# Patient Record
Sex: MALE | Race: BLACK OR AFRICAN AMERICAN | Employment: FULL TIME | URBAN - METROPOLITAN AREA
[De-identification: names, ages, dates, MRNs, and addresses within clinical notes are randomized per-mention and may not be internally consistent; named-entity substitution may affect disease eponyms.]

---

## 2018-01-11 ENCOUNTER — OFFICE VISIT (OUTPATIENT)
Dept: INTERNAL MEDICINE CLINIC | Age: 53
End: 2018-01-11

## 2018-01-11 VITALS
HEART RATE: 88 BPM | WEIGHT: 228 LBS | DIASTOLIC BLOOD PRESSURE: 72 MMHG | BODY MASS INDEX: 30.22 KG/M2 | SYSTOLIC BLOOD PRESSURE: 138 MMHG | RESPIRATION RATE: 16 BRPM | HEIGHT: 73 IN

## 2018-01-11 DIAGNOSIS — R73.03 PRE-DIABETES: ICD-10-CM

## 2018-01-11 DIAGNOSIS — I10 ESSENTIAL HYPERTENSION: ICD-10-CM

## 2018-01-11 DIAGNOSIS — D17.1 LIPOMA OF TORSO: ICD-10-CM

## 2018-01-11 DIAGNOSIS — R68.82 LOW LIBIDO: ICD-10-CM

## 2018-01-11 DIAGNOSIS — R13.19 ESOPHAGEAL DYSPHAGIA: Primary | ICD-10-CM

## 2018-01-11 PROCEDURE — 99204 OFFICE O/P NEW MOD 45 MIN: CPT | Performed by: INTERNAL MEDICINE

## 2018-01-11 RX ORDER — ASPIRIN 81 MG/1
81 TABLET ORAL DAILY
Qty: 30 TABLET | Refills: 3 | Status: SHIPPED | OUTPATIENT
Start: 2018-01-11 | End: 2018-05-10 | Stop reason: SDUPTHER

## 2018-01-11 RX ORDER — NIFEDIPINE 90 MG/1
90 TABLET, FILM COATED, EXTENDED RELEASE ORAL DAILY
COMMUNITY
End: 2018-01-11 | Stop reason: SDUPTHER

## 2018-01-11 RX ORDER — LOSARTAN POTASSIUM 25 MG/1
25 TABLET ORAL DAILY
Qty: 30 TABLET | Refills: 3 | Status: SHIPPED | OUTPATIENT
Start: 2018-01-11 | End: 2018-05-10 | Stop reason: SDUPTHER

## 2018-01-11 RX ORDER — M-VIT,TX,IRON,MINS/CALC/FOLIC 27MG-0.4MG
1 TABLET ORAL DAILY
COMMUNITY

## 2018-01-11 RX ORDER — NIFEDIPINE 90 MG/1
90 TABLET, FILM COATED, EXTENDED RELEASE ORAL DAILY
Qty: 30 TABLET | Refills: 3 | Status: SHIPPED | OUTPATIENT
Start: 2018-01-11 | End: 2018-08-06 | Stop reason: SDUPTHER

## 2018-01-11 RX ORDER — HYDROCHLOROTHIAZIDE 25 MG/1
25 TABLET ORAL EVERY MORNING
Qty: 30 TABLET | Refills: 3 | Status: SHIPPED | OUTPATIENT
Start: 2018-01-11 | End: 2018-05-10 | Stop reason: SDUPTHER

## 2018-01-11 RX ORDER — LISINOPRIL AND HYDROCHLOROTHIAZIDE 20; 12.5 MG/1; MG/1
1 TABLET ORAL DAILY
COMMUNITY
End: 2018-01-11 | Stop reason: SINTOL

## 2018-01-11 RX ORDER — OMEPRAZOLE 40 MG/1
40 CAPSULE, DELAYED RELEASE ORAL
Qty: 30 CAPSULE | Refills: 3 | Status: SHIPPED | OUTPATIENT
Start: 2018-01-11 | End: 2018-05-10 | Stop reason: SDUPTHER

## 2018-01-14 ASSESSMENT — ENCOUNTER SYMPTOMS
SHORTNESS OF BREATH: 0
COUGH: 1
EYES NEGATIVE: 1
BLOOD IN STOOL: 0
ABDOMINAL PAIN: 0
VOMITING: 1
CONSTIPATION: 0
TROUBLE SWALLOWING: 1
DIARRHEA: 0

## 2018-02-03 ENCOUNTER — HOSPITAL ENCOUNTER (OUTPATIENT)
Dept: GENERAL RADIOLOGY | Age: 53
Discharge: HOME | End: 2018-02-03
Attending: INTERNAL MEDICINE | Admitting: INTERNAL MEDICINE

## 2018-02-03 DIAGNOSIS — R13.19 ESOPHAGEAL DYSPHAGIA: ICD-10-CM

## 2018-02-03 DIAGNOSIS — R13.10 DYSPHAGIA: ICD-10-CM

## 2018-05-10 RX ORDER — HYDROCHLOROTHIAZIDE 25 MG/1
TABLET ORAL
Qty: 30 TABLET | Refills: 2 | Status: SHIPPED | OUTPATIENT
Start: 2018-05-10 | End: 2018-08-10 | Stop reason: SDUPTHER

## 2018-05-10 RX ORDER — ASPIRIN 81 MG/1
TABLET ORAL
Qty: 30 TABLET | Refills: 2 | Status: SHIPPED | OUTPATIENT
Start: 2018-05-10 | End: 2018-08-10 | Stop reason: SDUPTHER

## 2018-05-10 RX ORDER — OMEPRAZOLE 40 MG/1
CAPSULE, DELAYED RELEASE ORAL
Qty: 30 CAPSULE | Refills: 2 | Status: SHIPPED | OUTPATIENT
Start: 2018-05-10 | End: 2018-08-10 | Stop reason: SDUPTHER

## 2018-05-10 RX ORDER — LOSARTAN POTASSIUM 25 MG/1
TABLET ORAL
Qty: 30 TABLET | Refills: 2 | Status: SHIPPED | OUTPATIENT
Start: 2018-05-10 | End: 2018-08-10 | Stop reason: SDUPTHER

## 2018-08-06 RX ORDER — NIFEDIPINE 90 MG/1
90 TABLET, FILM COATED, EXTENDED RELEASE ORAL DAILY
Qty: 30 TABLET | Refills: 1 | Status: SHIPPED | OUTPATIENT
Start: 2018-08-06 | End: 2018-08-07 | Stop reason: SDUPTHER

## 2018-08-07 RX ORDER — NIFEDIPINE 90 MG/1
90 TABLET, FILM COATED, EXTENDED RELEASE ORAL DAILY
Qty: 30 TABLET | Refills: 1 | Status: SHIPPED | OUTPATIENT
Start: 2018-08-07 | End: 2018-10-09 | Stop reason: SDUPTHER

## 2018-08-10 RX ORDER — OMEPRAZOLE 40 MG/1
CAPSULE, DELAYED RELEASE ORAL
Qty: 30 CAPSULE | Refills: 1 | Status: SHIPPED | OUTPATIENT
Start: 2018-08-10 | End: 2018-10-09 | Stop reason: SDUPTHER

## 2018-08-10 RX ORDER — ASPIRIN 81 MG/1
TABLET ORAL
Qty: 30 TABLET | Refills: 1 | Status: SHIPPED | OUTPATIENT
Start: 2018-08-10 | End: 2018-10-09 | Stop reason: SDUPTHER

## 2018-08-10 RX ORDER — LOSARTAN POTASSIUM 25 MG/1
TABLET ORAL
Qty: 30 TABLET | Refills: 1 | Status: SHIPPED | OUTPATIENT
Start: 2018-08-10 | End: 2018-10-09 | Stop reason: SDUPTHER

## 2018-08-10 RX ORDER — HYDROCHLOROTHIAZIDE 25 MG/1
TABLET ORAL
Qty: 30 TABLET | Refills: 1 | Status: SHIPPED | OUTPATIENT
Start: 2018-08-10 | End: 2018-10-09 | Stop reason: SDUPTHER

## 2018-09-23 ENCOUNTER — HOSPITAL ENCOUNTER (EMERGENCY)
Age: 53
Discharge: HOME | End: 2018-09-23
Payer: COMMERCIAL

## 2018-09-23 ENCOUNTER — APPOINTMENT (OUTPATIENT)
Dept: GENERAL RADIOLOGY | Age: 53
End: 2018-09-23
Payer: COMMERCIAL

## 2018-09-23 VITALS
HEART RATE: 81 BPM | BODY MASS INDEX: 30.88 KG/M2 | OXYGEN SATURATION: 93 % | HEIGHT: 72 IN | RESPIRATION RATE: 18 BRPM | TEMPERATURE: 98.4 F | SYSTOLIC BLOOD PRESSURE: 148 MMHG | DIASTOLIC BLOOD PRESSURE: 93 MMHG | WEIGHT: 228 LBS

## 2018-09-23 DIAGNOSIS — M25.561 CHRONIC PAIN OF RIGHT KNEE: Primary | ICD-10-CM

## 2018-09-23 DIAGNOSIS — G89.29 CHRONIC PAIN OF RIGHT KNEE: Primary | ICD-10-CM

## 2018-09-23 PROCEDURE — 6370000000 HC RX 637 (ALT 250 FOR IP): Performed by: PHYSICIAN ASSISTANT

## 2018-09-23 PROCEDURE — 73560 X-RAY EXAM OF KNEE 1 OR 2: CPT

## 2018-09-23 PROCEDURE — 99283 EMERGENCY DEPT VISIT LOW MDM: CPT

## 2018-09-23 PROCEDURE — 6360000002 HC RX W HCPCS: Performed by: PHYSICIAN ASSISTANT

## 2018-09-23 RX ORDER — OXYCODONE HYDROCHLORIDE AND ACETAMINOPHEN 5; 325 MG/1; MG/1
2 TABLET ORAL ONCE
Status: COMPLETED | OUTPATIENT
Start: 2018-09-23 | End: 2018-09-23

## 2018-09-23 RX ORDER — TRAMADOL HYDROCHLORIDE 50 MG/1
50 TABLET ORAL EVERY 6 HOURS PRN
Qty: 10 TABLET | Refills: 0 | Status: SHIPPED | OUTPATIENT
Start: 2018-09-23 | End: 2018-10-03

## 2018-09-23 RX ORDER — CHLORAL HYDRATE 500 MG
3000 CAPSULE ORAL 3 TIMES DAILY
COMMUNITY
End: 2019-10-23 | Stop reason: ALTCHOICE

## 2018-09-23 RX ORDER — ONDANSETRON 4 MG/1
4 TABLET, ORALLY DISINTEGRATING ORAL ONCE
Status: COMPLETED | OUTPATIENT
Start: 2018-09-23 | End: 2018-09-23

## 2018-09-23 RX ADMIN — ONDANSETRON 4 MG: 4 TABLET, ORALLY DISINTEGRATING ORAL at 06:51

## 2018-09-23 RX ADMIN — OXYCODONE HYDROCHLORIDE AND ACETAMINOPHEN 2 TABLET: 5; 325 TABLET ORAL at 06:51

## 2018-09-23 ASSESSMENT — PAIN SCALES - GENERAL
PAINLEVEL_OUTOF10: 6
PAINLEVEL_OUTOF10: 7

## 2018-10-05 ENCOUNTER — TELEPHONE (OUTPATIENT)
Dept: INTERNAL MEDICINE CLINIC | Age: 53
End: 2018-10-05

## 2018-10-05 ENCOUNTER — OFFICE VISIT (OUTPATIENT)
Dept: INTERNAL MEDICINE CLINIC | Age: 53
End: 2018-10-05
Payer: COMMERCIAL

## 2018-10-05 VITALS
WEIGHT: 234 LBS | TEMPERATURE: 98.2 F | SYSTOLIC BLOOD PRESSURE: 126 MMHG | BODY MASS INDEX: 31.01 KG/M2 | HEART RATE: 80 BPM | HEIGHT: 73 IN | DIASTOLIC BLOOD PRESSURE: 76 MMHG | RESPIRATION RATE: 16 BRPM

## 2018-10-05 DIAGNOSIS — Z01.818 PREOP EXAM FOR INTERNAL MEDICINE: ICD-10-CM

## 2018-10-05 DIAGNOSIS — Z82.49 FAMILY HISTORY OF BLOOD CLOTS: ICD-10-CM

## 2018-10-05 DIAGNOSIS — I10 ESSENTIAL HYPERTENSION: ICD-10-CM

## 2018-10-05 DIAGNOSIS — S83.241D TEAR OF MEDIAL MENISCUS OF RIGHT KNEE, CURRENT, UNSPECIFIED TEAR TYPE, SUBSEQUENT ENCOUNTER: Primary | ICD-10-CM

## 2018-10-05 PROCEDURE — 93000 ELECTROCARDIOGRAM COMPLETE: CPT | Performed by: INTERNAL MEDICINE

## 2018-10-05 PROCEDURE — 99242 OFF/OP CONSLTJ NEW/EST SF 20: CPT | Performed by: INTERNAL MEDICINE

## 2018-10-05 ASSESSMENT — PATIENT HEALTH QUESTIONNAIRE - PHQ9
SUM OF ALL RESPONSES TO PHQ9 QUESTIONS 1 & 2: 0
1. LITTLE INTEREST OR PLEASURE IN DOING THINGS: 0
SUM OF ALL RESPONSES TO PHQ QUESTIONS 1-9: 0
SUM OF ALL RESPONSES TO PHQ QUESTIONS 1-9: 0
2. FEELING DOWN, DEPRESSED OR HOPELESS: 0

## 2018-10-09 DIAGNOSIS — I10 ESSENTIAL HYPERTENSION: Primary | ICD-10-CM

## 2018-10-10 RX ORDER — HYDROCHLOROTHIAZIDE 25 MG/1
TABLET ORAL
Qty: 30 TABLET | Refills: 0 | Status: SHIPPED | OUTPATIENT
Start: 2018-10-10 | End: 2018-11-19 | Stop reason: SDUPTHER

## 2018-10-10 RX ORDER — NIFEDIPINE 90 MG/1
TABLET, EXTENDED RELEASE ORAL
Qty: 30 TABLET | Refills: 2 | Status: SHIPPED | OUTPATIENT
Start: 2018-10-10 | End: 2019-01-11 | Stop reason: SDUPTHER

## 2018-10-10 RX ORDER — OMEPRAZOLE 40 MG/1
40 CAPSULE, DELAYED RELEASE ORAL DAILY PRN
Qty: 30 CAPSULE | Refills: 0 | Status: SHIPPED | OUTPATIENT
Start: 2018-10-10 | End: 2018-11-19 | Stop reason: SDUPTHER

## 2018-10-10 RX ORDER — LOSARTAN POTASSIUM 25 MG/1
TABLET ORAL
Qty: 30 TABLET | Refills: 0 | Status: SHIPPED | OUTPATIENT
Start: 2018-10-10 | End: 2018-11-19 | Stop reason: SDUPTHER

## 2018-10-10 RX ORDER — ASPIRIN 81 MG/1
TABLET ORAL
Qty: 30 TABLET | Refills: 0 | Status: SHIPPED | OUTPATIENT
Start: 2018-10-10 | End: 2020-08-31 | Stop reason: ALTCHOICE

## 2018-11-05 ENCOUNTER — TELEPHONE (OUTPATIENT)
Dept: INTERNAL MEDICINE CLINIC | Age: 53
End: 2018-11-05

## 2018-11-05 ENCOUNTER — OFFICE VISIT (OUTPATIENT)
Dept: INTERNAL MEDICINE CLINIC | Age: 53
End: 2018-11-05
Payer: COMMERCIAL

## 2018-11-05 VITALS
DIASTOLIC BLOOD PRESSURE: 78 MMHG | BODY MASS INDEX: 28.63 KG/M2 | TEMPERATURE: 97.5 F | HEART RATE: 80 BPM | SYSTOLIC BLOOD PRESSURE: 134 MMHG | HEIGHT: 73 IN | WEIGHT: 216 LBS

## 2018-11-05 DIAGNOSIS — I10 ESSENTIAL HYPERTENSION: Primary | ICD-10-CM

## 2018-11-05 DIAGNOSIS — Z01.818 PREOP EXAM FOR INTERNAL MEDICINE: ICD-10-CM

## 2018-11-05 DIAGNOSIS — S83.241D ACUTE MEDIAL MENISCUS TEAR OF RIGHT KNEE, SUBSEQUENT ENCOUNTER: ICD-10-CM

## 2018-11-05 PROCEDURE — 99242 OFF/OP CONSLTJ NEW/EST SF 20: CPT | Performed by: INTERNAL MEDICINE

## 2018-11-05 NOTE — TELEPHONE ENCOUNTER
RADHIKAI:  I called Dr. Yani Gutierrez office @ 12:56 am when patient checked in, to obtain what labs, etc, surgeon wanted for pre-op. I informed [employer] [facility] with [facility] that patient was here @ appointment now, so we needed this information asap. I received a call @ 2:45 from [facility], after patient had already completed his appointment and was gone, stating that surgeon, wants a CBC, BMP, and Urine with Culture. [facility] also states that patient was given a prescription with all labs deemed necessary by Dr. Karrie Finnegan. (Patient stated to me @ check in that he didn't have any paperwork with him.)    [facility] can be reached @ [phone].

## 2018-11-05 NOTE — TELEPHONE ENCOUNTER
Spoke with patient he will call [place] states he already had these done last time.  Instructed patient to call   [facility] for their advice on this

## 2018-11-05 NOTE — PROGRESS NOTES
Pre-operative evaluation    11/5/2018    Nahun Lindo  [id number]    Mr. Nahun Lindo is here for a preoperative evaluation. He is frustrated because he is off work for 5 weeks and still have not had his right knee operated on. He was sent to rheumatology and diagnosed with CPPD, psudogout, not RA. Patient denies chest pain, SOB, NVD, FC, rash, malaise, rigor, dizziness/lightheadness, other pertinent ROS was also reviewed. /78   Pulse 80   Temp 97.5 °F (36.4 °C) (Oral)   Ht 6' 1\" (1.854 m)   Wt 216 lb (98 kg)   BMI 28.50 kg/m²     Gen: Patient appears well groomed, well appearing  HEAD: Atraumatic, normocephalic,   Eyes: PERRLA, EOMI   Neck: supple, no thyroid nodule appreciated, no JVD  Chest: Clear to auscultation JAYA, unlabored breathing, normal expansion  Heart: Regular rate, regular rhythm, no murmur, no rub  Abdomen: Non-tender, non-distended, bowel sounds present x3  Extremities: no edema, distal pulses intact  Patient was alert and oriented to person, place and time  MSK: right knee swelling, pain, reduced ROM    Pamela Quinones was seen today for knee injury. Diagnoses and all orders for this visit:    Preop exam for internal medicine  Preoperative evaluation- from a cardiovascular stand point, patient is at low to moderate risk for proposed operation, but the risk is acceptable. Risk of complications of any surgery, including but not limited to MI, CVA, and death was explained. From a medical perspective the proposed surgery is reasonable. Ok to proceed from a medical perspective pending on labs/EKG/imaging review.     Essential hypertension  Controlled overall, continue meds, follow renal function     Acute medial meniscus tear of right knee, subsequent encounter  Planned for knee scope 11/8/2018 with [facility]      Current Outpatient Prescriptions on File Prior to Visit   Medication Sig Dispense Refill    NIFEdipine (PROCARDIA XL) 90 MG extended release tablet TAKE ONE TABLET BY

## 2018-11-21 RX ORDER — OMEPRAZOLE 40 MG/1
CAPSULE, DELAYED RELEASE ORAL
Qty: 30 CAPSULE | Refills: 0 | Status: SHIPPED | OUTPATIENT
Start: 2018-11-21 | End: 2019-10-23

## 2018-11-21 RX ORDER — HYDROCHLOROTHIAZIDE 25 MG/1
TABLET ORAL
Qty: 30 TABLET | Refills: 0 | Status: SHIPPED | OUTPATIENT
Start: 2018-11-21 | End: 2018-12-23 | Stop reason: SDUPTHER

## 2018-11-21 RX ORDER — LOSARTAN POTASSIUM 25 MG/1
TABLET ORAL
Qty: 30 TABLET | Refills: 0 | Status: SHIPPED | OUTPATIENT
Start: 2018-11-21 | End: 2018-12-23 | Stop reason: SDUPTHER

## 2018-12-24 RX ORDER — OMEPRAZOLE 40 MG/1
CAPSULE, DELAYED RELEASE ORAL
Qty: 30 CAPSULE | Refills: 5 | OUTPATIENT
Start: 2018-12-24

## 2018-12-24 RX ORDER — LOSARTAN POTASSIUM 25 MG/1
TABLET ORAL
Qty: 30 TABLET | Refills: 5 | Status: SHIPPED | OUTPATIENT
Start: 2018-12-24 | End: 2019-01-11 | Stop reason: ALTCHOICE

## 2018-12-24 RX ORDER — HYDROCHLOROTHIAZIDE 25 MG/1
TABLET ORAL
Qty: 30 TABLET | Refills: 5 | Status: SHIPPED | OUTPATIENT
Start: 2018-12-24 | End: 2019-01-11 | Stop reason: ALTCHOICE

## 2019-01-11 ENCOUNTER — OFFICE VISIT (OUTPATIENT)
Dept: INTERNAL MEDICINE CLINIC | Age: 54
End: 2019-01-11
Payer: COMMERCIAL

## 2019-01-11 VITALS
BODY MASS INDEX: 29.52 KG/M2 | RESPIRATION RATE: 16 BRPM | SYSTOLIC BLOOD PRESSURE: 136 MMHG | TEMPERATURE: 98.1 F | HEIGHT: 74 IN | HEART RATE: 72 BPM | DIASTOLIC BLOOD PRESSURE: 86 MMHG | WEIGHT: 230 LBS

## 2019-01-11 DIAGNOSIS — M25.561 CHRONIC PAIN OF RIGHT KNEE: ICD-10-CM

## 2019-01-11 DIAGNOSIS — I10 ESSENTIAL HYPERTENSION: Primary | ICD-10-CM

## 2019-01-11 DIAGNOSIS — Z23 NEED FOR PROPHYLACTIC VACCINATION AGAINST DIPHTHERIA-TETANUS-PERTUSSIS (DTP): ICD-10-CM

## 2019-01-11 DIAGNOSIS — H10.89 OTHER CONJUNCTIVITIS OF LEFT EYE: ICD-10-CM

## 2019-01-11 DIAGNOSIS — G89.29 CHRONIC PAIN OF RIGHT KNEE: ICD-10-CM

## 2019-01-11 PROCEDURE — 99214 OFFICE O/P EST MOD 30 MIN: CPT | Performed by: INTERNAL MEDICINE

## 2019-01-11 RX ORDER — LOSARTAN POTASSIUM AND HYDROCHLOROTHIAZIDE 12.5; 5 MG/1; MG/1
1 TABLET ORAL DAILY
Qty: 30 TABLET | Refills: 2 | Status: SHIPPED | OUTPATIENT
Start: 2019-01-11 | End: 2019-04-07 | Stop reason: SDUPTHER

## 2019-01-11 RX ORDER — NIFEDIPINE 90 MG/1
TABLET, EXTENDED RELEASE ORAL
Qty: 30 TABLET | Refills: 2 | Status: SHIPPED | OUTPATIENT
Start: 2019-01-11 | End: 2019-04-20 | Stop reason: SDUPTHER

## 2019-01-11 RX ORDER — NEOMYCIN SULFATE, POLYMYXIN B SULFATE AND DEXAMETHASONE 3.5; 10000; 1 MG/ML; [USP'U]/ML; MG/ML
1 SUSPENSION/ DROPS OPHTHALMIC 4 TIMES DAILY
Qty: 5 ML | Refills: 0 | Status: SHIPPED | OUTPATIENT
Start: 2019-01-11 | End: 2019-01-21

## 2019-01-18 ENCOUNTER — HOSPITAL ENCOUNTER (OUTPATIENT)
Dept: PHYSICAL THERAPY | Age: 54
Setting detail: THERAPIES SERIES
Discharge: HOME | End: 2019-01-18
Payer: COMMERCIAL

## 2019-01-18 PROCEDURE — 97161 PT EVAL LOW COMPLEX 20 MIN: CPT

## 2019-01-18 PROCEDURE — 97110 THERAPEUTIC EXERCISES: CPT

## 2019-01-21 ENCOUNTER — HOSPITAL ENCOUNTER (OUTPATIENT)
Dept: PHYSICAL THERAPY | Age: 54
Setting detail: THERAPIES SERIES
Discharge: HOME | End: 2019-01-21
Payer: COMMERCIAL

## 2019-01-21 PROCEDURE — 97110 THERAPEUTIC EXERCISES: CPT

## 2019-01-24 ENCOUNTER — HOSPITAL ENCOUNTER (OUTPATIENT)
Dept: PHYSICAL THERAPY | Age: 54
Setting detail: THERAPIES SERIES
Discharge: HOME | End: 2019-01-24
Payer: COMMERCIAL

## 2019-01-24 PROCEDURE — 97110 THERAPEUTIC EXERCISES: CPT

## 2019-01-29 ENCOUNTER — HOSPITAL ENCOUNTER (OUTPATIENT)
Dept: PHYSICAL THERAPY | Age: 54
Setting detail: THERAPIES SERIES
Discharge: HOME | End: 2019-01-29
Payer: COMMERCIAL

## 2019-01-29 PROCEDURE — 97140 MANUAL THERAPY 1/> REGIONS: CPT

## 2019-01-29 PROCEDURE — 97110 THERAPEUTIC EXERCISES: CPT

## 2019-01-31 ENCOUNTER — HOSPITAL ENCOUNTER (OUTPATIENT)
Dept: PHYSICAL THERAPY | Age: 54
Setting detail: THERAPIES SERIES
Discharge: HOME | End: 2019-01-31
Payer: COMMERCIAL

## 2019-01-31 PROCEDURE — 97140 MANUAL THERAPY 1/> REGIONS: CPT

## 2019-01-31 PROCEDURE — 97110 THERAPEUTIC EXERCISES: CPT

## 2019-02-26 ENCOUNTER — TELEPHONE (OUTPATIENT)
Dept: INTERNAL MEDICINE CLINIC | Age: 54
End: 2019-02-26

## 2019-02-26 DIAGNOSIS — G89.29 CHRONIC PAIN OF RIGHT KNEE: Primary | ICD-10-CM

## 2019-02-26 DIAGNOSIS — M25.561 CHRONIC PAIN OF RIGHT KNEE: Primary | ICD-10-CM

## 2019-03-05 ENCOUNTER — HOSPITAL ENCOUNTER (OUTPATIENT)
Dept: PHYSICAL THERAPY | Age: 54
Setting detail: THERAPIES SERIES
Discharge: HOME | End: 2019-03-05
Payer: COMMERCIAL

## 2019-03-12 ENCOUNTER — OFFICE VISIT (OUTPATIENT)
Dept: ORTHOPEDIC SURGERY | Age: 54
End: 2019-03-12
Payer: COMMERCIAL

## 2019-03-12 VITALS
HEART RATE: 68 BPM | SYSTOLIC BLOOD PRESSURE: 145 MMHG | BODY MASS INDEX: 30.48 KG/M2 | HEIGHT: 73 IN | WEIGHT: 230 LBS | DIASTOLIC BLOOD PRESSURE: 81 MMHG

## 2019-03-12 DIAGNOSIS — G89.29 CHRONIC PAIN OF RIGHT KNEE: Primary | ICD-10-CM

## 2019-03-12 DIAGNOSIS — M25.561 CHRONIC PAIN OF RIGHT KNEE: Primary | ICD-10-CM

## 2019-03-12 DIAGNOSIS — M22.2X1 PATELLOFEMORAL SYNDROME OF RIGHT KNEE: ICD-10-CM

## 2019-03-12 PROCEDURE — 99204 OFFICE O/P NEW MOD 45 MIN: CPT | Performed by: PHYSICIAN ASSISTANT

## 2019-03-12 RX ORDER — PREDNISONE 1 MG/1
5 TABLET ORAL DAILY
Qty: 10 TABLET | Refills: 2 | Status: SHIPPED | OUTPATIENT
Start: 2019-03-12 | End: 2019-04-16 | Stop reason: SDUPTHER

## 2019-03-12 RX ORDER — TRAMADOL HYDROCHLORIDE 50 MG/1
50 TABLET ORAL EVERY 6 HOURS PRN
Qty: 28 TABLET | Refills: 0 | Status: SHIPPED | OUTPATIENT
Start: 2019-03-12 | End: 2019-03-19

## 2019-04-01 ENCOUNTER — HOSPITAL ENCOUNTER (OUTPATIENT)
Dept: PHYSICAL THERAPY | Age: 54
Setting detail: THERAPIES SERIES
Discharge: HOME | End: 2019-04-01
Payer: COMMERCIAL

## 2019-04-01 PROCEDURE — 97110 THERAPEUTIC EXERCISES: CPT | Performed by: PHYSICAL THERAPIST

## 2019-04-01 PROCEDURE — 97161 PT EVAL LOW COMPLEX 20 MIN: CPT | Performed by: PHYSICAL THERAPIST

## 2019-04-01 NOTE — PLAN OF CARE
The [address] [place] [address] [facility]                                                       Physical Therapy Certification    Dear Referring Practitioner: Dr Darcy Wing,    We had the pleasure of evaluating the following patient for physical therapy services at [address] [place] [address]. A summary of our findings can be found in the initial assessment below. This includes our plan of care. If you have any questions or concerns regarding these findings, please do not hesitate to contact me at the office phone number checked above. Thank you for the referral.       Physician Signature:_______________________________Date:__________________  By signing above (or electronic signature), therapists plan is approved by physician      Patient: Sweta Jordan   : 1965   MRN: [id number]  Referring Physician: Referring Practitioner: Dr Darcy Wing      Evaluation Date: 2019      Medical Diagnosis Information:  Diagnosis: Right knee pain/PF pain  ICD10: M25.561; M22.2X1   Treatment Diagnosis: increased pain; decreased strength; decreased flexibility; increased swelling                                         Insurance information: PT Insurance Information: [place]     Precautions/ Contra-indications: PF pain  Latex Allergy:  [x]NO      []YES  Preferred Language for Healthcare:   [x]English       []other:    SUBJECTIVE: Patient stated complaint:  Pt had knee surgery at [facility] in 2018. He has pain in the knee and swelling laterally. He also reports PF pain. Cortisone injections are not providing relief. The last 2-3 weeks he has occasional giving way. Pain with the knee extension position. He has popping but this is not painful.      Relevant Medical History: OA, HBP  Functional Disability Index:PT G-Codes  Functional Assessment Tool Used: LEFS  Score: 75%    Pain Scale: 1-8/10  Easing factors:   Provocative pectoris (I20)  []Atherosclerosis (I70)   Musculoskeletal conditions   []Disc pathology   []Congenital spine pathologies   [x]Prior surgical intervention  []Osteoporosis (M81.8)  []Osteopenia (M85.8)   Endocrine conditions   []Hypothyroid (E03.9)  []Hyperthyroid Gastrointestinal conditions   []Constipation (W97.19)   Metabolic conditions   []Morbid obesity (E66.01)  []Diabetes type 1(E10.65) or 2 (E11.65)   []Neuropathy (G60.9)     Pulmonary conditions   []Asthma (J45)  []Coughing   []COPD (J44.9)   Psychological Disorders  []Anxiety (F41.9)  []Depression (F32.9)   []Other:   []Other:          Barriers to/and or personal factors that will affect rehab potential:              []Age  []Sex              []Motivation/Lack of Motivation                        []Co-Morbidities              []Cognitive Function, education/learning barriers              []Environmental, home barriers              []profession/work barriers  []past PT/medical experience  []other:  Justification: Pt has a good rehab potential.     Falls Risk Assessment (30 days):   [x] Falls Risk assessed and no intervention required.   [] Falls Risk assessed and Patient requires intervention due to being higher risk   TUG score (>12s at risk):     [] Falls education provided, including       G-Codes:  PT G-Codes  Functional Assessment Tool Used: LEFS  Score: 75%    ASSESSMENT:   Functional Impairments:     []Noted lumbar/proximal hip/LE hypomobility   []Decreased LE functional ROM   [x]Decreased core/proximal hip strength and neuromuscular control   [x]Decreased LE functional strength   [x]Reduced balance/proprioceptive control   []other:      Functional Activity Limitations (from functional questionnaire and intake)   [x]Reduced ability to tolerate prolonged functional positions   []Reduced ability or difficulty with changes of positions or transfers between positions   []Reduced ability to maintain good posture and demonstrate good body mechanics with sitting, bending, and lifting   [x]Reduced ability to sleep   [] Reduced ability or tolerance with driving and/or computer work   []Reduced ability to perform lifting, carrying tasks   []Reduced ability to squat   []Reduced ability to forward bend   []Reduced ability to ambulate prolonged functional periods/distances/surfaces   [x]Reduced ability to ascend/descend stairs   []Reduced ability to run, hop or jump   []other:     Participation Restrictions   []Reduced participation in self care activities   [x]Reduced participation in home management activities   []Reduced participation in work activities   [x]Reduced participation in social activities. []Reduced participation in sport activities. Classification :    []Signs/symptoms consistent with post-surgical status including decreased ROM, strength and function.    []Signs/symptoms consistent with joint sprain/strain   [x]Signs/symptoms consistent with patella-femoral syndrome   [x]Signs/symptoms consistent with knee OA/hip OA   []Signs/symptoms consistent with internal derangement of knee/Hip   [x]Signs/symptoms consistent with functional hip weakness/NMR control      []Signs/symptoms consistent with tendinitis/tendinosis    []signs/symptoms consistent with pathology which may benefit from Dry needling      []other:      Prognosis/Rehab Potential:      []Excellent   [x]Good    []Fair   []Poor    Tolerance of evaluation/treatment:    []Excellent   [x]Good    []Fair   []Poor    Physical Therapy Evaluation Complexity Justification  [x] A history of present problem with:  [x] no personal factors and/or comorbidities that impact the plan of care;  []1-2 personal factors and/or comorbidities that impact the plan of care  []3 personal factors and/or comorbidities that impact the plan of care  [x] An examination of body systems using standardized tests and measures addressing any of the following: body structures and functions (impairments), activity limitations, and/or participation restrictions;:  [] a total of 1-2 or more elements   [x] a total of 3 or more elements   [] a total of 4 or more elements   [x] A clinical presentation with:  [x] stable and/or uncomplicated characteristics   [] evolving clinical presentation with changing characteristics  [] unstable and unpredictable characteristics;   [x] Clinical decision making of [x] low, [] moderate, [] high complexity using standardized patient assessment instrument and/or measurable assessment of functional outcome. [x] EVAL (LOW) 15692 (typically 20 minutes face-to-face)  [] EVAL (MOD) 37512 (typically 30 minutes face-to-face)  [] EVAL (HIGH) 01947 (typically 45 minutes face-to-face)  [] RE-EVAL       PLAN  Frequency/Duration:  2 days per week for 6-8 Weeks:  Interventions:  [x]  Therapeutic exercise including: strength training, ROM, for Lower extremity and core   [x]  NMR activation and proprioception for LE, Glutes and Core   [x]  Manual therapy as indicated for LE, Hip and spine to include: Dry Needling/IASTM, STM, PROM, Gr I-IV mobilizations, manipulation. [x] Modalities as needed that may include: thermal agents, E-stim, Biofeedback, US, iontophoresis as indicated  [x] Patient education on joint protection, postural re-education, activity modification, progression of HEP. HEP instruction: Pt received a written HEP today. (see scanned forms)    GOALS:  Patient stated goal: get stronger and better function of right leg. Be able to run confidently and painfree. Therapist goals for Patient:   Short Term Goals: To be achieved in: 2 weeks  1. Independent in HEP and progression per patient tolerance, in order to prevent re-injury. 2. Patient will have a decrease in pain to facilitate improvement in movement, function, and ADLs as indicated by Functional Deficits. Long Term Goals: To be achieved in: 8 weeks  1.  Disability index score of 10% or less for the LEFS to assist with reaching prior level of function. 2. Patient will demonstrate increased AROM to WNL to allow for proper joint functioning as indicated by patients Functional Deficits. 3. Patient will demonstrate an increase in Strength to good proximal hip strength and control in LE to allow for proper functional mobility as indicated by patients Functional Deficits. 4. Patient will return to ADL and household functional activities without increased symptoms or restriction. 5. Pt will initiate the return to running and recreational activities with mild restrictions and symptoms.        Electronically signed by:  Jaylene Campbell PT

## 2019-04-01 NOTE — FLOWSHEET NOTE
[address] [facility]    [facility] Therapy Daily Treatment Note  Date:  2019    Patient Name:  Candida Ross    :  1965  MRN: [id number]  Restrictions/Precautions:    Medical/Treatment Diagnosis Information:  · Diagnosis: Right knee pain/PF pain  ·   ICD10: M25.561; M22.2X1  · Treatment Diagnosis: increased pain; decreased strength; decreased flexibility; increased swelling  Insurance/Certification information:  PT Insurance Information: [place]  Physician Information:  Referring Practitioner: Dr Virginia Sullivan of care signed (Y/N):     Date of Patient follow up with Physician:     G-Code (if applicable):      Date G-Code Applied:  19  PT G-Codes  Functional Assessment Tool Used: LEFS  Score: 75%    Progress Note: [x]  Yes  []  No  Next due by: Visit #10       Latex Allergy:  [x]NO      []YES  Preferred Language for Healthcare:   [x]English       []other:    Visit # Insurance Allowable   15 60     Pain level:  1-8/10     SUBJECTIVE:  See eval    OBJECTIVE: See eval  Observation:   Test measurements:      RESTRICTIONS/PRECAUTIONS: PF pain    Exercises/Interventions:   Exercise/Equipment Resistance/Repetitions Other comments   Stretching     Hamstring [id number]    Towel Pull     Inclined Calf [id number]    Hip Flexion     ITB     Groin     Quad                    SLR     Supine 3x10    Abduction Clam shells 3x10    Adduction 3x10    Prone     SLR+          Isometrics     Quad sets [id number] with a towel roll         Patellar Glides     Medial     Superior     Inferior          ROM     Sheet Pulls     Hang Weights     Passive     Active     Weight Shift     Ankle Pumps                    CKC     Calf raises     Wall sits     Step ups     1 leg stand [id number]    Squatting     CC TKE     Balance     bridges 3x10         PRE     Extension  RANGE:   Flexion  RANGE:        Quantum machines     Leg press  3x10 3x/week   Leg extension     Leg curl 3x10 3x/week Manual interventions                     Therapeutic Exercise and NMR EXR  [x] (49353) Provided verbal/tactile cueing for activities related to strengthening, flexibility, endurance, ROM for improvements in LE, proximal hip, and core control with self care, mobility, lifting, ambulation.  [] (30831) Provided verbal/tactile cueing for activities related to improving balance, coordination, kinesthetic sense, posture, motor skill, proprioception  to assist with LE, proximal hip, and core control in self care, mobility, lifting, ambulation and eccentric single leg control.      NMR and Therapeutic Activities:    [] (69205 or 54320) Provided verbal/tactile cueing for activities related to improving balance, coordination, kinesthetic sense, posture, motor skill, proprioception and motor activation to allow for proper function of core, proximal hip and LE with self care and ADLs  [] (35184) Gait Re-education- Provided training and instruction to the patient for proper LE, core and proximal hip recruitment and positioning and eccentric body weight control with ambulation re-education including up and down stairs     Home Exercise Program:    [x] (27156) Reviewed/Progressed HEP activities related to strengthening, flexibility, endurance, ROM of core, proximal hip and LE for functional self-care, mobility, lifting and ambulation/stair navigation   [] (75783)Reviewed/Progressed HEP activities related to improving balance, coordination, kinesthetic sense, posture, motor skill, proprioception of core, proximal hip and LE for self care, mobility, lifting, and ambulation/stair navigation      Manual Treatments:  PROM / STM / Oscillations-Mobs:  G-I, II, III, IV (PA's, Inf., Post.)  [] (64661) Provided manual therapy to mobilize LE, proximal hip and/or LS spine soft tissue/joints for the purpose of modulating pain, promoting relaxation,  increasing ROM, reducing/eliminating soft tissue swelling/inflammation/restriction, improving soft tissue extensibility and allowing for proper ROM for normal function with self care, mobility, lifting and ambulation. Modalities:  Pt declined ice. He will do this at home. Charges:  Timed Code Treatment Minutes: 25   Total Treatment Minutes: 60       [x] EVAL (LOW) 16532 (typically 20 minutes face-to-face)  [] EVAL (MOD) 85121 (typically 30 minutes face-to-face)  [] EVAL (HIGH) 53369 (typically 45 minutes face-to-face)  [] RE-EVAL   [x] PL(66151) x  2   [] IONTO  [] NMR (66895) x      [] VASO  [] Manual (63641) x       [] Other:  [] TA x       [] Mech Traction (70139)  [] ES(attended) (27018)      [] ES (un) (35423):     GOALS:   GOALS:  Patient stated goal: get stronger and better function of right leg. Be able to run confidently and painfree.     Therapist goals for Patient:   Short Term Goals: To be achieved in: 2 weeks  1. Independent in HEP and progression per patient tolerance, in order to prevent re-injury. 2. Patient will have a decrease in pain to facilitate improvement in movement, function, and ADLs as indicated by Functional Deficits.     Long Term Goals: To be achieved in: 8 weeks  1. Disability index score of 10% or less for the LEFS to assist with reaching prior level of function. 2. Patient will demonstrate increased AROM to WNL to allow for proper joint functioning as indicated by patients Functional Deficits. 3. Patient will demonstrate an increase in Strength to good proximal hip strength and control in LE to allow for proper functional mobility as indicated by patients Functional Deficits. 4. Patient will return to ADL and household functional activities without increased symptoms or restriction. 5. Pt will initiate the return to running and recreational activities with mild restrictions and symptoms. Progression Towards Functional goals:  [] Patient is progressing as expected towards functional goals listed.     [] Progression is slowed due to complexities listed. [] Progression has been slowed due to co-morbidities.   [x] Plan just implemented, too soon to assess goals progression  [] Other:     ASSESSMENT:  See eval    Treatment/Activity Tolerance:  [] Patient tolerated treatment well [] Patient limited by fatique  [x] Patient limited by pain  [] Patient limited by other medical complications  [] Other:     Patient education:  HEP/POC/BFR/home e-stim unit    Prognosis: [x] Good [] Fair  [] Poor    Patient Requires Follow-up: [x] Yes  [] No    PLAN: See eval  [] Continue per plan of care [] Alter current plan (see comments)  [x] Plan of care initiated [] Hold pending MD visit [] Discharge    Electronically signed by: Sariah Quiroz PT

## 2019-04-05 ENCOUNTER — HOSPITAL ENCOUNTER (OUTPATIENT)
Dept: PHYSICAL THERAPY | Age: 54
Setting detail: THERAPIES SERIES
Discharge: HOME | End: 2019-04-05
Payer: COMMERCIAL

## 2019-04-05 PROCEDURE — 97110 THERAPEUTIC EXERCISES: CPT | Performed by: PHYSICAL THERAPIST

## 2019-04-05 PROCEDURE — 97530 THERAPEUTIC ACTIVITIES: CPT | Performed by: PHYSICAL THERAPIST

## 2019-04-05 NOTE — FLOWSHEET NOTE
[facility]    Physical Therapy Daily Treatment Note  Date:  2019    Patient Name:  Ute Marshall    :  1965  MRN: [id number]  Restrictions/Precautions:    Medical/Treatment Diagnosis Information:  · Diagnosis: Right knee pain/PF pain  ·   ICD10: M25.561; M22.2X1  · Treatment Diagnosis: increased pain; decreased strength; decreased flexibility; increased swelling  Insurance/Certification information:  PT Insurance Information: [place]  Physician Information:  Referring Practitioner: Dr Xiang Lala of care signed (Y/N):     Date of Patient follow up with Physician:     G-Code (if applicable):      Date G-Code Applied:  19  PT G-Codes  Functional Assessment Tool Used: LEFS  Score: 75%    Progress Note: [x]  Yes  []  No  Next due by: Visit #10       Latex Allergy:  [x]NO      []YES  Preferred Language for Healthcare:   [x]English       []other:    Visit # Insurance Allowable   16 60     Pain level:       SUBJECTIVE:  Pt is doing well today. No new c/o's to report.      OBJECTIVE:   Observation: Quad/vmo= fair/poor  Test measurements:  Knee extension= -3/-1 degree    RESTRICTIONS/PRECAUTIONS: PF pain    Exercises/Interventions:   Exercise/Equipment Resistance/Repetitions Other comments   Stretching     Hamstring 2u46jrt    Towel Pull     Inclined Calf 3u47gbf    Hip Flexion     ITB     Groin     Quad                    SLR     Supine    Abduction    Adduction    Prone     SLR+          Isometrics     Quad sets [id number] with a towel roll         Patellar Glides     Medial     Superior     Inferior          ROM     Sheet Pulls     Hang Weights     Passive     Active     Weight Shift     Ankle Pumps                    CKC     Calf raises     Wall sits 0g86jfw left heel off ground Monitor PF pain <2-3/10   Step ups     1 leg stand 2u64zgl    Squatting     CC TKE Black 3x10    Balance     bridges          PRE     Extension  RANGE:   Flexion RANGE:        Quantum machines     Leg press  100#  3x10 ECC    Leg extension     Leg curl 45# 3x10     Bike Seat 17  3.0 x 5 min  Warm up recumbent   Manual interventions                     Therapeutic Exercise and NMR EXR  [x] (92812) Provided verbal/tactile cueing for activities related to strengthening, flexibility, endurance, ROM for improvements in LE, proximal hip, and core control with self care, mobility, lifting, ambulation.  [] (52996) Provided verbal/tactile cueing for activities related to improving balance, coordination, kinesthetic sense, posture, motor skill, proprioception  to assist with LE, proximal hip, and core control in self care, mobility, lifting, ambulation and eccentric single leg control.      NMR and Therapeutic Activities:    [x] (30694 or 87705) Provided verbal/tactile cueing for activities related to improving balance, coordination, kinesthetic sense, posture, motor skill, proprioception and motor activation to allow for proper function of core, proximal hip and LE with self care and ADLs  [] (82879) Gait Re-education- Provided training and instruction to the patient for proper LE, core and proximal hip recruitment and positioning and eccentric body weight control with ambulation re-education including up and down stairs     Home Exercise Program:    [x] (29447) Reviewed/Progressed HEP activities related to strengthening, flexibility, endurance, ROM of core, proximal hip and LE for functional self-care, mobility, lifting and ambulation/stair navigation   [x] (24519)Reviewed/Progressed HEP activities related to improving balance, coordination, kinesthetic sense, posture, motor skill, proprioception of core, proximal hip and LE for self care, mobility, lifting, and ambulation/stair navigation      Manual Treatments:  PROM / STM / Oscillations-Mobs:  G-I, II, III, IV (PA's, Inf., Post.)  [] (05651) Provided manual therapy to mobilize LE, proximal hip and/or LS spine soft tissue/joints for the purpose of modulating pain, promoting relaxation,  increasing ROM, reducing/eliminating soft tissue swelling/inflammation/restriction, improving soft tissue extensibility and allowing for proper ROM for normal function with self care, mobility, lifting and ambulation. Modalities:  Pt declined ice. He will do this at home. Discussed propping with towel during ice. No weight. Charges:  Timed Code Treatment Minutes: 45   Total Treatment Minutes: 60       [] EVAL (LOW) 52508 (typically 20 minutes face-to-face)  [] EVAL (MOD) 23796 (typically 30 minutes face-to-face)  [] EVAL (HIGH) 40634 (typically 45 minutes face-to-face)  [] RE-EVAL   [x] CM(82956) x  2   [] IONTO  [] NMR (19850) x      [] VASO  [] Manual (67973) x       [] Other:  [x] TA x  1    [] Mech Traction (72832)  [] ES(attended) (93144)      [] ES (un) (22735):     GOALS:  Patient stated goal: get stronger and better function of right leg. Be able to run confidently and painfree.     Therapist goals for Patient:   Short Term Goals: To be achieved in: 2 weeks  1. Independent in HEP and progression per patient tolerance, in order to prevent re-injury. 2. Patient will have a decrease in pain to facilitate improvement in movement, function, and ADLs as indicated by Functional Deficits.     Long Term Goals: To be achieved in: 8 weeks  1. Disability index score of 10% or less for the LEFS to assist with reaching prior level of function. 2. Patient will demonstrate increased AROM to WNL to allow for proper joint functioning as indicated by patients Functional Deficits. 3. Patient will demonstrate an increase in Strength to good proximal hip strength and control in LE to allow for proper functional mobility as indicated by patients Functional Deficits. 4. Patient will return to ADL and household functional activities without increased symptoms or restriction.    5. Pt will initiate the return to running and recreational activities with mild restrictions and symptoms. Progression Towards Functional goals:  [] Patient is progressing as expected towards functional goals listed. [] Progression is slowed due to complexities listed. [] Progression has been slowed due to co-morbidities. [x] Plan just implemented, too soon to assess goals progression  [] Other:     ASSESSMENT:  Pt tolerated the exercises well. He has no pain during the exercises, but he is concerned that he will have pain later today. Pt has a slight knee extension ROM deficit. VMO contraction appears to be improved today. Quad atrophy noted. Pt struggles with SLS. Leg press was difficult. Treatment/Activity Tolerance:  [x] Patient tolerated treatment well [x] Patient limited by fatique  [] Patient limited by pain  [] Patient limited by other medical complications  [] Other:     Patient education:  HEP updated 4-5-19; consider BFR at next visit    Prognosis: [x] Good [] Fair  [] Poor    Patient Requires Follow-up: [x] Yes  [] No    PLAN: Stretching, strengthening, balance program. Consider BFR training.    [x] Continue per plan of care [] Alter current plan (see comments)  [] Plan of care initiated [] Hold pending MD visit [] Discharge    Electronically signed by: Tee Peres PT

## 2019-04-08 ENCOUNTER — HOSPITAL ENCOUNTER (OUTPATIENT)
Dept: PHYSICAL THERAPY | Age: 54
Setting detail: THERAPIES SERIES
Discharge: HOME | End: 2019-04-08
Payer: COMMERCIAL

## 2019-04-08 PROCEDURE — 97530 THERAPEUTIC ACTIVITIES: CPT | Performed by: PHYSICAL THERAPIST

## 2019-04-08 PROCEDURE — 97110 THERAPEUTIC EXERCISES: CPT | Performed by: PHYSICAL THERAPIST

## 2019-04-08 NOTE — FLOWSHEET NOTE
[facility]    Physical Therapy Daily Treatment Note  Date:  2019    Patient Name:  Meño Jarrett    :  1965  MRN: [id number]  Restrictions/Precautions:    Medical/Treatment Diagnosis Information:  · Diagnosis: Right knee pain/PF pain  ·   ICD10: M25.561; M22.2X1  · Treatment Diagnosis: increased pain; decreased strength; decreased flexibility; increased swelling  Insurance/Certification information:  PT Insurance Information: [place]  Physician Information:  Referring Practitioner: Dr Radha Hernandez of care signed (Y/N):     Date of Patient follow up with Physician:     G-Code (if applicable):      Date G-Code Applied:  19  PT G-Codes  Functional Assessment Tool Used: LEFS  Score: 75%    Progress Note: [x]  Yes  []  No  Next due by: Visit #10       Latex Allergy:  [x]NO      []YES  Preferred Language for Healthcare:   [x]English       []other:    Visit # Insurance Allowable   17 60     Pain level:       SUBJECTIVE:  Pt had some hip soreness after the last session. He states he has not used those muscles lately. Pt feels the knee is improving.      OBJECTIVE:   Observation: Quad/vmo= fair/poor  Test measurements:  Knee extension= -3/-1 degree    RESTRICTIONS/PRECAUTIONS: PF pain    Exercises/Interventions:   Exercise/Equipment Resistance/Repetitions Other comments   Stretching     Hamstring [id number]    Towel Pull     Inclined Calf 7o02mxi    Hip Flexion     ITB     Groin     Quad                    SLR     Supine 3x10 VCs for proper technique   Abduction Clam shells 3x10 Blue band    Adduction 1#  3x10    Prone     SLR+          Isometrics     Quad sets [id number] with a towel roll         Patellar Glides     Medial     Superior     Inferior          ROM     Sheet Pulls     Hang Weights     Passive     Active     Weight Shift     Ankle Pumps                    CKC     Calf raises     Wall sits 6g62rzc left heel off ground Monitor PF pain <2-3/10   Step ups     1 leg stand [id number] Visual cues needed/occasional support   Squatting     CC TKE Silver  3x10    Balance     bridges 3x10 Blue band          PRE     Extension  RANGE:   Flexion  RANGE:        Quantum machines     Leg press  100#  3x10 ECC    Leg extension     Leg curl 50# 3x10     Bike Seat 17  3.0 x 5 min  Warm up recumbent   Manual interventions                     Therapeutic Exercise and NMR EXR  [x] (63244) Provided verbal/tactile cueing for activities related to strengthening, flexibility, endurance, ROM for improvements in LE, proximal hip, and core control with self care, mobility, lifting, ambulation.  [] (96855) Provided verbal/tactile cueing for activities related to improving balance, coordination, kinesthetic sense, posture, motor skill, proprioception  to assist with LE, proximal hip, and core control in self care, mobility, lifting, ambulation and eccentric single leg control.      NMR and Therapeutic Activities:    [x] (40962 or 53600) Provided verbal/tactile cueing for activities related to improving balance, coordination, kinesthetic sense, posture, motor skill, proprioception and motor activation to allow for proper function of core, proximal hip and LE with self care and ADLs  [] (92042) Gait Re-education- Provided training and instruction to the patient for proper LE, core and proximal hip recruitment and positioning and eccentric body weight control with ambulation re-education including up and down stairs     Home Exercise Program:    [x] (08301) Reviewed/Progressed HEP activities related to strengthening, flexibility, endurance, ROM of core, proximal hip and LE for functional self-care, mobility, lifting and ambulation/stair navigation   [x] (40222)Reviewed/Progressed HEP activities related to improving balance, coordination, kinesthetic sense, posture, motor skill, proprioception of core, proximal hip and LE for self care, mobility, lifting, and ambulation/stair household functional activities without increased symptoms or restriction. 5. Pt will initiate the return to running and recreational activities with mild restrictions and symptoms. Progression Towards Functional goals:  [] Patient is progressing as expected towards functional goals listed. [] Progression is slowed due to complexities listed. [] Progression has been slowed due to co-morbidities. [x] Plan just implemented, too soon to assess goals progression  [] Other:     ASSESSMENT:  Pt is doing well with the current program. He had some soreness in the hips after last session. Pt notes improvement in the knee this week. VCs for proper knee position during leg raises. Pt is responding well to therapy. Treatment/Activity Tolerance:  [x] Patient tolerated treatment well [x] Patient limited by fatique  [] Patient limited by pain  [] Patient limited by other medical complications  [] Other:     Patient education:  HEP updated 4-5-19; consider BFR     Prognosis: [x] Good [] Fair  [] Poor    Patient Requires Follow-up: [x] Yes  [] No    PLAN: Stretching, strengthening, balance program. Consider BFR training.    [x] Continue per plan of care [] Alter current plan (see comments)  [] Plan of care initiated [] Hold pending MD visit [] Discharge    Electronically signed by: Nel Estrada PT

## 2019-04-10 RX ORDER — LOSARTAN POTASSIUM AND HYDROCHLOROTHIAZIDE 12.5; 5 MG/1; MG/1
TABLET ORAL
Qty: 30 TABLET | Refills: 1 | Status: SHIPPED | OUTPATIENT
Start: 2019-04-10 | End: 2019-04-30 | Stop reason: ALTCHOICE

## 2019-04-11 ENCOUNTER — HOSPITAL ENCOUNTER (OUTPATIENT)
Dept: PHYSICAL THERAPY | Age: 54
Setting detail: THERAPIES SERIES
Discharge: HOME | End: 2019-04-11
Payer: COMMERCIAL

## 2019-04-11 PROCEDURE — 97112 NEUROMUSCULAR REEDUCATION: CPT | Performed by: PHYSICAL THERAPIST

## 2019-04-11 PROCEDURE — 97530 THERAPEUTIC ACTIVITIES: CPT | Performed by: PHYSICAL THERAPIST

## 2019-04-11 PROCEDURE — 97110 THERAPEUTIC EXERCISES: CPT | Performed by: PHYSICAL THERAPIST

## 2019-04-11 NOTE — FLOWSHEET NOTE
Oscillations-Mobs:  G-I, II, III, IV (PA's, Inf., Post.)  [] (28705) Provided manual therapy to mobilize LE, proximal hip and/or LS spine soft tissue/joints for the purpose of modulating pain, promoting relaxation,  increasing ROM, reducing/eliminating soft tissue swelling/inflammation/restriction, improving soft tissue extensibility and allowing for proper ROM for normal function with self care, mobility, lifting and ambulation. Modalities:  Ice at home. Charges:  Timed Code Treatment Minutes: 56   Total Treatment Minutes: 60       [] EVAL (LOW) 96568 (typically 20 minutes face-to-face)  [] EVAL (MOD) 38106 (typically 30 minutes face-to-face)  [] EVAL (HIGH) 13892 (typically 45 minutes face-to-face)  [] RE-EVAL   [x] GY(70973) x  2   [] IONTO  [x] NMR (47197) x  1   [] VASO  [] Manual (02321) x       [] Other:  [x] TA x  1    [] Mech Traction (88098)  [] ES(attended) (48809)      [] ES (un) (89262):     GOALS:  Patient stated goal: get stronger and better function of right leg. Be able to run confidently and painfree.     Therapist goals for Patient:   Short Term Goals: To be achieved in: 2 weeks  1. Independent in HEP and progression per patient tolerance, in order to prevent re-injury. 2. Patient will have a decrease in pain to facilitate improvement in movement, function, and ADLs as indicated by Functional Deficits.     Long Term Goals: To be achieved in: 8 weeks  1. Disability index score of 10% or less for the LEFS to assist with reaching prior level of function. 2. Patient will demonstrate increased AROM to WNL to allow for proper joint functioning as indicated by patients Functional Deficits. 3. Patient will demonstrate an increase in Strength to good proximal hip strength and control in LE to allow for proper functional mobility as indicated by patients Functional Deficits. 4. Patient will return to ADL and household functional activities without increased symptoms or restriction.    5. Pt will initiate the return to running and recreational activities with mild restrictions and symptoms. Progression Towards Functional goals:  [] Patient is progressing as expected towards functional goals listed. [] Progression is slowed due to complexities listed. [] Progression has been slowed due to co-morbidities. [x] Plan just implemented, too soon to assess goals progression  [] Other:     ASSESSMENT:  Pt has decreased knee pain and improved function since initiating therapy program. He is responding well with the PF program for quad strengthening and PF protection. Pt continues to need work on LE flexibility and quad strengthening/endurance. Consider BFR at the next visit. Treatment/Activity Tolerance:  [x] Patient tolerated treatment well [x] Patient limited by fatique  [] Patient limited by pain  [] Patient limited by other medical complications  [] Other:     Patient education:  HEP updated 4-5-19; consider BFR     Prognosis: [x] Good [] Fair  [] Poor    Patient Requires Follow-up: [x] Yes  [] No    PLAN: Stretching, strengthening, balance program. Consider BFR training.    [x] Continue per plan of care [] Alter current plan (see comments)  [] Plan of care initiated [] Hold pending MD visit [] Discharge    Electronically signed by: Zac Cortez PT

## 2019-04-16 ENCOUNTER — HOSPITAL ENCOUNTER (OUTPATIENT)
Dept: PHYSICAL THERAPY | Age: 54
Setting detail: THERAPIES SERIES
Discharge: HOME | End: 2019-04-16
Payer: COMMERCIAL

## 2019-04-16 ENCOUNTER — OFFICE VISIT (OUTPATIENT)
Dept: ORTHOPEDIC SURGERY | Age: 54
End: 2019-04-16
Payer: COMMERCIAL

## 2019-04-16 VITALS
DIASTOLIC BLOOD PRESSURE: 86 MMHG | BODY MASS INDEX: 30.47 KG/M2 | WEIGHT: 229.94 LBS | HEIGHT: 73 IN | HEART RATE: 88 BPM | SYSTOLIC BLOOD PRESSURE: 146 MMHG

## 2019-04-16 DIAGNOSIS — G89.29 CHRONIC PAIN OF RIGHT KNEE: Primary | ICD-10-CM

## 2019-04-16 DIAGNOSIS — M25.561 CHRONIC PAIN OF RIGHT KNEE: Primary | ICD-10-CM

## 2019-04-16 DIAGNOSIS — M22.2X1 PATELLOFEMORAL SYNDROME OF RIGHT KNEE: ICD-10-CM

## 2019-04-16 PROCEDURE — 99214 OFFICE O/P EST MOD 30 MIN: CPT | Performed by: ORTHOPAEDIC SURGERY

## 2019-04-16 PROCEDURE — 97110 THERAPEUTIC EXERCISES: CPT | Performed by: PHYSICAL THERAPIST

## 2019-04-16 PROCEDURE — 97530 THERAPEUTIC ACTIVITIES: CPT | Performed by: PHYSICAL THERAPIST

## 2019-04-16 PROCEDURE — 97112 NEUROMUSCULAR REEDUCATION: CPT | Performed by: PHYSICAL THERAPIST

## 2019-04-16 RX ORDER — NABUMETONE 500 MG/1
500 TABLET, FILM COATED ORAL 2 TIMES DAILY
Qty: 60 TABLET | Refills: 3 | Status: SHIPPED | OUTPATIENT
Start: 2019-04-16 | End: 2019-10-23 | Stop reason: ALTCHOICE

## 2019-04-16 RX ORDER — PREDNISONE 1 MG/1
5 TABLET ORAL DAILY
Qty: 10 TABLET | Refills: 1 | Status: SHIPPED | OUTPATIENT
Start: 2019-04-16 | End: 2019-04-26

## 2019-04-16 NOTE — FLOWSHEET NOTE
[facility] [address] [place] [address] [facility]    Physical Therapy Daily Treatment Note  Date:  2019    Patient Name:  Inessa Tinsley    :  1965  MRN: [id number]  Restrictions/Precautions:    Medical/Treatment Diagnosis Information:  · Diagnosis: Right knee pain/PF pain  ·   ICD10: M25.561; M22.2X1  · Treatment Diagnosis: increased pain; decreased strength; decreased flexibility; increased swelling  Insurance/Certification information:  PT Insurance Information: [place]  Physician Information:  Referring Practitioner: Dr Clara Jama of care signed (Y/N):     Date of Patient follow up with Physician: 19    G-Code (if applicable):      Date G-Code Applied:  19  PT G-Codes  Functional Assessment Tool Used: LEFS  Score: 75%    Progress Note: [x]  Yes  []  No  Next due by: Visit #10       Latex Allergy:  [x]NO      []YES  Preferred Language for Healthcare:   [x]English       []other:    Visit # Insurance Allowable   19 60     Pain level:   0/10    SUBJECTIVE:  Pt goal= run 2 miles, 3x/week. Pt states his knee is feeling better. He has some pain in his first ray. OBJECTIVE:   Observation: Quad/vmo= fair/poor/slow  Decreased patellar mobility  Pt has high BP. This may be a contraindication for BFR.  Need to discuss with the MD.   Test measurements:  Knee extension= -2 degree    RESTRICTIONS/PRECAUTIONS: PF pain    Exercises/Interventions:   Exercise/Equipment Resistance/Repetitions Other comments   Stretching     Hamstring 3h22hmh    Towel Pull     Inclined Calf 5e40nmj    Hip Flexion     ITB [id number]    Groin     Quad                    SLR     Supine 1# 3x10 VCs for proper technique   Abduction Clam shells 3x10 Blue band    Adduction 1#  3x10    Prone     SLR+          Isometrics     Quad sets [id number] with a towel roll         Patellar Glides     Medial     Superior     Inferior          ROM     Sheet Pulls     Hang Weights     Passive     Active     Weight Shift Ankle Pumps                    CKC     Calf raises     Wall sits 4m01iei left heel off ground Monitor PF pain <2-3/10   Step ups     1 leg stand 6m80iyi Visual cues needed/occasional support   Squatting     CC TKE Silver  3x10    Balance     bridges 3x10 Blue band          PRE     Extension  RANGE:   Flexion  RANGE:        Quantum machines     Leg press  120#  3x10 ECC    Leg extension     Leg curl 50# 3x10     Bike Seat 17  3.5 x 10 min  Warm up recumbent   Manual interventions                     Therapeutic Exercise and NMR EXR  [x] (49849) Provided verbal/tactile cueing for activities related to strengthening, flexibility, endurance, ROM for improvements in LE, proximal hip, and core control with self care, mobility, lifting, ambulation.  [] (73117) Provided verbal/tactile cueing for activities related to improving balance, coordination, kinesthetic sense, posture, motor skill, proprioception  to assist with LE, proximal hip, and core control in self care, mobility, lifting, ambulation and eccentric single leg control.      NMR and Therapeutic Activities:    [x] (02391 or 14043) Provided verbal/tactile cueing for activities related to improving balance, coordination, kinesthetic sense, posture, motor skill, proprioception and motor activation to allow for proper function of core, proximal hip and LE with self care and ADLs  [] (06114) Gait Re-education- Provided training and instruction to the patient for proper LE, core and proximal hip recruitment and positioning and eccentric body weight control with ambulation re-education including up and down stairs     Home Exercise Program:    [x] (98708) Reviewed/Progressed HEP activities related to strengthening, flexibility, endurance, ROM of core, proximal hip and LE for functional self-care, mobility, lifting and ambulation/stair navigation   [x] (57637)Reviewed/Progressed HEP activities related to improving balance, coordination, kinesthetic sense, posture, motor skill, proprioception of core, proximal hip and LE for self care, mobility, lifting, and ambulation/stair navigation      Manual Treatments:  PROM / STM / Oscillations-Mobs:  G-I, II, III, IV (PA's, Inf., Post.)  [] (98304) Provided manual therapy to mobilize LE, proximal hip and/or LS spine soft tissue/joints for the purpose of modulating pain, promoting relaxation,  increasing ROM, reducing/eliminating soft tissue swelling/inflammation/restriction, improving soft tissue extensibility and allowing for proper ROM for normal function with self care, mobility, lifting and ambulation. Modalities:  Ice at home. Charges:  Timed Code Treatment Minutes: 52   Total Treatment Minutes: 60       [] EVAL (LOW) 95306 (typically 20 minutes face-to-face)  [] EVAL (MOD) 13149 (typically 30 minutes face-to-face)  [] EVAL (HIGH) 48649 (typically 45 minutes face-to-face)  [] RE-EVAL   [x] IY(77499) x  1   [] IONTO  [x] NMR (34104) x  1   [] VASO  [] Manual (93982) x       [] Other:  [x] TA x  1    [] Mech Traction (53237)  [] ES(attended) (03087)      [] ES (un) (74277):     GOALS:  Patient stated goal: get stronger and better function of right leg. Be able to run confidently and painfree.     Therapist goals for Patient:   Short Term Goals: To be achieved in: 2 weeks  1. Independent in HEP and progression per patient tolerance, in order to prevent re-injury. 2. Patient will have a decrease in pain to facilitate improvement in movement, function, and ADLs as indicated by Functional Deficits.     Long Term Goals: To be achieved in: 8 weeks  1. Disability index score of 10% or less for the LEFS to assist with reaching prior level of function. 2. Patient will demonstrate increased AROM to WNL to allow for proper joint functioning as indicated by patients Functional Deficits.    3. Patient will demonstrate an increase in Strength to good proximal hip strength and control in LE to allow for proper functional mobility as indicated

## 2019-04-16 NOTE — PROGRESS NOTES
Patient Name: Cory Ibanez  : 1965  DOS: 2019        Chief Complaint:   Chief Complaint   Patient presents with    Knee Pain     Right knee     Currently:     Pain much improved as is swelling with regimen of steroids and regular physical therapy. Still feels therer is limited ability to get ot runngin. Previously:      History of Present Illness:  Cory Ibanez is a 48 y.o. male who presents with a chief complaint of continued complaints of pain in the knee with irritation with walking, stairs and with overuse. Pain in the knee regularly with swelling and discomfort. Increase in pain over the past several months time. Patient notes that he has had pain over the anterior portion of the knee and knee cap over the past several months to years. States it is intermittent in nature will have 1-2 good weeks and then once he really bad weeks. States that he had been evaluated previously with [facility] orthopedics as well as through his primary care provider he recently had arthroscopic surgery of the right knee for repair of multiple minor meniscus tears as well as to clean up some calcium deposits within the cartilage. Patient notes limited to no relief after the scope was performed states current pain level is 5 out of 10. He notes previous treatment with multiple rounds of physical therapy as well as anti-inflammatories and topical anti-inflammatories, patient also had multiple injections with cortisone not sure if he used gel injections or not. Patient works as a  with the [employer] so has to get up and down stairs quite frequently but also still a lot of driving which she describes increasing discomfort with the driving. Patient also notes a previous MCL tear at age 14 that was repaired surgically as had limited to no issues with that surgery.   Patient denies buckling or giving way of the knee but states he has not been testing or pushing the knee too far for fear of causing buckling or giving way. Medical History  Current Medications:   Prior to Admission medications    Medication Sig Start Date End Date Taking? Authorizing Provider   predniSONE (DELTASONE) 5 MG tablet Take 1 tablet by mouth daily for 10 days 4/16/19 4/26/19 Yes Lilian Swan MD   nabumetone (RELAFEN) 500 MG tablet Take 1 tablet by mouth 2 times daily 4/16/19  Yes Lilian Swan MD   losartan-hydrochlorothiazide (HYZAAR) 50-12.5 MG per tablet TAKE ONE TABLET BY MOUTH DAILY 4/10/19   Lisa Melgar DO   Homeopathic Products (SPEEDGEL) GEL Formula #8E Baclo 2%, Diclo 3%, DMSO 5%, Paradise 6%, Lido 2%, prilo 2% Pharm to comp 3/12/19   EVANGELINA Caldera   NIFEdipine (PROCARDIA XL) 90 MG extended release tablet TAKE ONE TABLET BY MOUTH DAILY 1/11/19   Lisa Melgar DO   omeprazole (PRILOSEC) 40 MG delayed release capsule TAKE ONE CAPSULE BY MOUTH EVERY MORNING BEFORE BREAKFAST AS NEEDED (REFLUX) 11/21/18   Lisa Melgar DO   Diclofenac Sodium (VOLTAREN PO) Take by mouth    Historical Provider, MD   ASPIRIN ADULT LOW STRENGTH 81 MG EC tablet TAKE ONE TABLET BY MOUTH DAILY 10/10/18   Lisa Melgar DO   Omega-3 Fatty Acids (FISH OIL) 1000 MG CAPS Take 3,000 mg by mouth 3 times daily    Historical Provider, MD   Multiple Vitamins-Minerals (THERAPEUTIC MULTIVITAMIN-MINERALS) tablet Take 1 tablet by mouth daily    Historical Provider, MD     Medical History:  has a past medical history of Arthritis and Hypertension. Allergies: No Known Allergies    Review of Systems:               Review of Systems ______  Constitutional: Negative for fever and diaphoresis. ____________  Respiratory: Negative for shortness of breath.  ________  Gastrointestinal: Negative for abdominal pain. ________  Musculoskeletal: Positive for joint pain. ____  Skin: Negative for itching. ____  Neurological: Negative for loss of consciousness.  ______________          Physical Exam __  Constitutional: She is oriented to person, visit    Diagnostic Test Findings:   Xray   Have reviewed the xrays above from 04/16/19   4 views of the right knee AP, lateral, sunrise and tunnel views were performed and reviewed today and my impression is: mild  osteoarthritis changes noted to the medial and lateral compartments with minimal joint space loss. Mild to moderate changes in the patellofemoral compartment with noted flattening of the femoral condyles greater on the right versus left possible congenital deformity or  Developmental defect after MCL trauma. Assessment and Plan:       Diagnosis Orders   1. Chronic pain of right knee  Ambulatory referral to Physical Therapy    predniSONE (DELTASONE) 5 MG tablet   2. Patellofemoral syndrome of right knee  Ambulatory referral to Physical Therapy    predniSONE (DELTASONE) 5 MG tablet              The orders below, if any, were placed during this visit:   Orders Placed This Encounter   Procedures    Ambulatory referral to Physical Therapy     Referral Priority:   Routine     Referral Type:   Eval and Treat     Requested Specialty:   Physical Therapy     Number of Visits Requested:   1              Treatment Plan:   -advise use of Cho-Pat brace on the right knee. -Gave patient prescription for biomed speed gel for topical application 3-4 times daily as needed for pain. -Gave patient prescription for prednisone 5 mg one daily when necessary pain  -Advised for the patient to utilize the prednisone similar to an anti-inflammatory and treat  During flares. -Gave patient prescription for physical therapy at the [facility] office with Silvino protocol with blood pressure cuff quadriceps strengthening and hamstring stretching start electrical stimulation       Such as tart cherry juice and tumeric.   - discussed with the patient for the potential of stem cell therapy as a treatment plan will place the patient on the stem cell list for future consideration.         Shirin Saucedo MD

## 2019-04-18 ENCOUNTER — HOSPITAL ENCOUNTER (OUTPATIENT)
Dept: PHYSICAL THERAPY | Age: 54
Setting detail: THERAPIES SERIES
Discharge: HOME | End: 2019-04-18
Payer: COMMERCIAL

## 2019-04-18 PROCEDURE — 97530 THERAPEUTIC ACTIVITIES: CPT | Performed by: PHYSICAL THERAPIST

## 2019-04-18 PROCEDURE — 97112 NEUROMUSCULAR REEDUCATION: CPT | Performed by: PHYSICAL THERAPIST

## 2019-04-18 PROCEDURE — 97110 THERAPEUTIC EXERCISES: CPT | Performed by: PHYSICAL THERAPIST

## 2019-04-18 NOTE — FLOWSHEET NOTE
The [facility]    Physical Therapy Daily Treatment Note  Date:  2019    Patient Name:  Alexx Multani    :  1965  MRN: [id number]  Restrictions/Precautions:    Medical/Treatment Diagnosis Information:  · Diagnosis: Right knee pain/PF pain  ·   ICD10: M25.561; M22.2X1  · Treatment Diagnosis: increased pain; decreased strength; decreased flexibility; increased swelling  Insurance/Certification information:  PT Insurance Information: [place]  Physician Information:  Referring Practitioner: Dr Will Livingston of care signed (Y/N):     Date of Patient follow up with Physician:     G-Code (if applicable):      Date G-Code Applied:  19  PT G-Codes  Functional Assessment Tool Used: LEFS  Score: 75%    Progress Note: [x]  Yes  []  No  Next due by: Visit #10       Latex Allergy:  [x]NO      []YES  Preferred Language for Healthcare:   [x]English       []other:    Visit # Insurance Allowable    total 60     Pain level:   0/10    SUBJECTIVE:  Pt is doing well this week. He had a good follow-up with the MD. Pt has occasional discomfort in the knee. Pt goal= run 2 miles, 3x/week. OBJECTIVE:   Observation: Quad/vmo= fair/poor/slow  Decreased patellar mobility  Poor HS flexibility  Pt has high BP. This may be a contraindication for BFR.  Need to discuss with the MD.   Test measurements:  Knee extension= -2 degree    RESTRICTIONS/PRECAUTIONS: PF pain    Exercises/Interventions:   Exercise/Equipment Resistance/Repetitions Other comments   Stretching     Hamstring [id number]    Towel Pull     Inclined Calf [id number]    Hip Flexion     ITB 3s25cwr    Groin     Quad                    SLR     Supine 1# 3x10 VCs for proper technique   Abduction Clam shells 3x10 Blue band    Adduction 1#  3x10    Prone     SLR+          Isometrics     Quad sets [id number] with a towel roll         Patellar Glides     Medial     Superior     Inferior          ROM     Sheet Pulls     Hang Weights     Passive     Active     Weight Shift     Ankle Pumps                    CKC     Calf raises     Wall sits [id number]  Monitor PF pain <2-3/10   Step ups     1 leg stand [id number] Visual cues needed/occasional support   Squatting     CC TKE Silver  3x10    Balance     bridges 3x10 Blue band          PRE     Extension  RANGE:   Flexion  RANGE:        Quantum machines     Leg press  120/120/140#  3x10 ECC 90-10 degrees   Leg extension Next visit 90-30 degrees   Leg curl 50# 3x10  0-90 degrees   Bike Seat 17  3.5 x 10 min  Warm up recumbent   Manual interventions                     Therapeutic Exercise and NMR EXR  [x] (31224) Provided verbal/tactile cueing for activities related to strengthening, flexibility, endurance, ROM for improvements in LE, proximal hip, and core control with self care, mobility, lifting, ambulation.  [] (92850) Provided verbal/tactile cueing for activities related to improving balance, coordination, kinesthetic sense, posture, motor skill, proprioception  to assist with LE, proximal hip, and core control in self care, mobility, lifting, ambulation and eccentric single leg control.      NMR and Therapeutic Activities:    [x] (79982 or 30803) Provided verbal/tactile cueing for activities related to improving balance, coordination, kinesthetic sense, posture, motor skill, proprioception and motor activation to allow for proper function of core, proximal hip and LE with self care and ADLs  [] (04099) Gait Re-education- Provided training and instruction to the patient for proper LE, core and proximal hip recruitment and positioning and eccentric body weight control with ambulation re-education including up and down stairs     Home Exercise Program:    [x] (46966) Reviewed/Progressed HEP activities related to strengthening, flexibility, endurance, ROM of core, proximal hip and LE for functional self-care, mobility, lifting and ambulation/stair navigation   [x] increase in Strength to good proximal hip strength and control in LE to allow for proper functional mobility as indicated by patients Functional Deficits. 4. Patient will return to ADL and household functional activities without increased symptoms or restriction. 5. Pt will initiate the return to running and recreational activities with mild restrictions and symptoms. Progression Towards Functional goals:  [] Patient is progressing as expected towards functional goals listed. [] Progression is slowed due to complexities listed. [] Progression has been slowed due to co-morbidities. [x] Plan just implemented, too soon to assess goals progression  [] Other:     ASSESSMENT:  Pt is making progress with the exercise program. Pt has poor HS flexibility and decreased VMO contraction. Quad strength is progressing well, but he has difficulty with a 1# leg raise. Pt is challenged with balance exercises. He does not feel like he is ready to run, but that is his goal. May need to consider a PF brace for running. Continue with quad strengthening and patella protection. Treatment/Activity Tolerance:  [x] Patient tolerated treatment well [] Patient limited by fatique  [] Patient limited by pain  [] Patient limited by other medical complications  [] Other:     Patient education:  HEP updated 4-5-19; consider BFR     Prognosis: [x] Good [] Fair  [] Poor    Patient Requires Follow-up: [x] Yes  [] No    PLAN: Stretching, strengthening, balance program. Consider BFR training.    [x] Continue per plan of care [] Alter current plan (see comments)  [] Plan of care initiated [] Hold pending MD visit [] Discharge    Electronically signed by: Nel Estrada PT

## 2019-04-22 NOTE — TELEPHONE ENCOUNTER
Last appointment: 1/11/2019  Next appointment: Visit date not found - due in July sent [employer] reminder to schedule   Last refill: 1/11/19

## 2019-04-23 ENCOUNTER — HOSPITAL ENCOUNTER (OUTPATIENT)
Dept: PHYSICAL THERAPY | Age: 54
Setting detail: THERAPIES SERIES
Discharge: HOME | End: 2019-04-23
Payer: COMMERCIAL

## 2019-04-23 PROCEDURE — 97112 NEUROMUSCULAR REEDUCATION: CPT | Performed by: PHYSICAL THERAPIST

## 2019-04-23 PROCEDURE — 97530 THERAPEUTIC ACTIVITIES: CPT | Performed by: PHYSICAL THERAPIST

## 2019-04-23 PROCEDURE — 97110 THERAPEUTIC EXERCISES: CPT | Performed by: PHYSICAL THERAPIST

## 2019-04-23 RX ORDER — NIFEDIPINE 90 MG/1
TABLET, EXTENDED RELEASE ORAL
Qty: 30 TABLET | Refills: 1 | Status: SHIPPED | OUTPATIENT
Start: 2019-04-23 | End: 2019-04-30 | Stop reason: SDUPTHER

## 2019-04-23 NOTE — FLOWSHEET NOTE
The [address] [place] [address] and Sports Rehabilitation, Anu Ludwig    Physical Therapy Daily Treatment Note  Date:  2019    Patient Name:  Anup Lundy    :  1965  MRN: [id number]  Restrictions/Precautions:    Medical/Treatment Diagnosis Information:  · Diagnosis: Right knee pain/PF pain  ·   ICD10: M25.561; M22.2X1  · Treatment Diagnosis: increased pain; decreased strength; decreased flexibility; increased swelling  Insurance/Certification information:  PT Insurance Information: [place]  Physician Information:  Referring Practitioner: Dr Bernie Corley of care signed (Y/N):     Date of Patient follow up with Physician:     G-Code (if applicable):      Date G-Code Applied:  19  PT G-Codes  Functional Assessment Tool Used: LEFS  Score: 75%    Progress Note: [x]  Yes  []  No  Next due by: Visit #10       Latex Allergy:  [x]NO      []YES  Preferred Language for Healthcare:   [x]English       []other:    Visit # Insurance Allowable    total 60     Pain level:   0/10    SUBJECTIVE:  Pt reports that he has been having some increased pain above his knee. Pt denies any changes in his activity level that may have caused his increased discomfort. Pt goal= run 2 miles, 3x/week. OBJECTIVE:   Observation: Quad/vmo= fair/poor/slow  Decreased patellar mobility  Poor HS flexibility  Pt has high BP. This may be a contraindication for BFR.  Need to discuss with the MD.   Test measurements:  Knee extension= -2 degree    RESTRICTIONS/PRECAUTIONS: PF pain    Exercises/Interventions:   Exercise/Equipment Resistance/Repetitions Other comments   Stretching     Hamstring [id number]    Towel Pull     Inclined Calf 0k94jap    Hip Flexion     ITB 3w79jot    Groin     Quad                    SLR     Supine 1# 3x10 VCs for proper technique   Abduction Clam shells 3x10 Blue band  1# 3 x 10    Adduction 1#  3x10    Prone     SLR+          Isometrics     Quad sets 37d05cbf with a towel roll         Patellar Glides     Medial     Superior     Inferior          ROM     Sheet Pulls     Hang Weights     Passive     Active     Weight Shift     Ankle Pumps                    CKC     Calf raises     Wall sits 1j99szt  Monitor PF pain <2-3/10   Step ups     1 leg stand [id number]    3-way sliders 5x each side Visual cues needed/occasional support   Squatting     CC TKE     Balance     bridges 3x10     Side steps 2 laps  Blue TB   Wobble board 30 taps  1 x 30\" Fwd/back; side/side             PRE     Extension  RANGE:   Flexion  RANGE:        Quantum machines     Leg press  120/120/140#  3x10 ECC 90-10 degrees   Leg extension 40/40/50# 3 x 10 90-30 degrees   Leg curl 60/60/70# 3x10  0-90 degrees   Bike Seat 17  3.5 x 10 min  Warm up recumbent   Manual interventions                     Therapeutic Exercise and NMR EXR  [x] (27450) Provided verbal/tactile cueing for activities related to strengthening, flexibility, endurance, ROM for improvements in LE, proximal hip, and core control with self care, mobility, lifting, ambulation.  [] (48734) Provided verbal/tactile cueing for activities related to improving balance, coordination, kinesthetic sense, posture, motor skill, proprioception  to assist with LE, proximal hip, and core control in self care, mobility, lifting, ambulation and eccentric single leg control.      NMR and Therapeutic Activities:    [x] (05584 or 28136) Provided verbal/tactile cueing for activities related to improving balance, coordination, kinesthetic sense, posture, motor skill, proprioception and motor activation to allow for proper function of core, proximal hip and LE with self care and ADLs  [] (04866) Gait Re-education- Provided training and instruction to the patient for proper LE, core and proximal hip recruitment and positioning and eccentric body weight control with ambulation re-education including up and down stairs     Home Exercise Program:    [x] (64798) Reviewed/Progressed HEP activities related to strengthening, flexibility, endurance, ROM of core, proximal hip and LE for functional self-care, mobility, lifting and ambulation/stair navigation   [x] (82476)Reviewed/Progressed HEP activities related to improving balance, coordination, kinesthetic sense, posture, motor skill, proprioception of core, proximal hip and LE for self care, mobility, lifting, and ambulation/stair navigation      Manual Treatments:  PROM / STM / Oscillations-Mobs:  G-I, II, III, IV (PA's, Inf., Post.)  [] (09292) Provided manual therapy to mobilize LE, proximal hip and/or LS spine soft tissue/joints for the purpose of modulating pain, promoting relaxation,  increasing ROM, reducing/eliminating soft tissue swelling/inflammation/restriction, improving soft tissue extensibility and allowing for proper ROM for normal function with self care, mobility, lifting and ambulation. Modalities:  Ice at home. Charges:  Timed Code Treatment Minutes: 50   Total Treatment Minutes: 60       [] EVAL (LOW) 50197 (typically 20 minutes face-to-face)  [] EVAL (MOD) 10942 (typically 30 minutes face-to-face)  [] EVAL (HIGH) 74806 (typically 45 minutes face-to-face)  [] RE-EVAL   [x] UO(84609) x  1   [] IONTO  [x] NMR (89311) x  1   [] VASO  [] Manual (34571) x       [] Other:  [x] TA x  1    [] Mech Traction (45602)  [] ES(attended) (44942)      [] ES (un) (59573):     GOALS:  Patient stated goal: get stronger and better function of right leg. Be able to run confidently and painfree.     Therapist goals for Patient:   Short Term Goals: To be achieved in: 2 weeks  1. Independent in HEP and progression per patient tolerance, in order to prevent re-injury. 2. Patient will have a decrease in pain to facilitate improvement in movement, function, and ADLs as indicated by Functional Deficits.     Long Term Goals: To be achieved in: 8 weeks  1. Disability index score of 10% or less for the LEFS to assist with reaching prior level of function.    2. Patient will demonstrate increased AROM to WNL to allow for proper joint functioning as indicated by patients Functional Deficits. 3. Patient will demonstrate an increase in Strength to good proximal hip strength and control in LE to allow for proper functional mobility as indicated by patients Functional Deficits. 4. Patient will return to ADL and household functional activities without increased symptoms or restriction. 5. Pt will initiate the return to running and recreational activities with mild restrictions and symptoms. Progression Towards Functional goals:  [] Patient is progressing as expected towards functional goals listed. [] Progression is slowed due to complexities listed. [] Progression has been slowed due to co-morbidities. [x] Plan just implemented, too soon to assess goals progression  [] Other:     ASSESSMENT: Pt tolerated treatment progressions well today. Pt reported slight increase in knee discomfort with quad sets, diminished after completing the exercise. Pt required tactile cues to correct sidelying abduction form today to decrease trunk rotation. Pt continues to have difficulty with SLS demonstrating mild to moderate body sway and occasional stepping strategy to correct balance. Pt challenged by 3-way sliders today, reporting fatigue following 5 repetitions. Good tolerance to resistance progression in the strength program today. Pt Continue to progress strengthening program to improve quad and hamstring strengthening as tolerated. Continue to progress balance program to improve static and dynamic control of the knee.       Treatment/Activity Tolerance:  [x] Patient tolerated treatment well [] Patient limited by fatique  [] Patient limited by pain  [] Patient limited by other medical complications  [] Other:     Patient education:  HEP updated 4-5-19     Prognosis: [x] Good [] Fair  [] Poor    Patient Requires Follow-up: [x] Yes  [] No    PLAN: Stretching, strengthening, balance

## 2019-04-25 ENCOUNTER — TELEPHONE (OUTPATIENT)
Dept: INTERNAL MEDICINE CLINIC | Age: 54
End: 2019-04-25

## 2019-04-25 ENCOUNTER — HOSPITAL ENCOUNTER (OUTPATIENT)
Dept: PHYSICAL THERAPY | Age: 54
Setting detail: THERAPIES SERIES
Discharge: HOME | End: 2019-04-25
Payer: COMMERCIAL

## 2019-04-25 PROCEDURE — 97110 THERAPEUTIC EXERCISES: CPT | Performed by: PHYSICAL THERAPIST

## 2019-04-25 PROCEDURE — 97530 THERAPEUTIC ACTIVITIES: CPT | Performed by: PHYSICAL THERAPIST

## 2019-04-25 NOTE — FLOWSHEET NOTE
Exercise Program:    [x] (73819) Reviewed/Progressed HEP activities related to strengthening, flexibility, endurance, ROM of core, proximal hip and LE for functional self-care, mobility, lifting and ambulation/stair navigation   [x] (78663)Reviewed/Progressed HEP activities related to improving balance, coordination, kinesthetic sense, posture, motor skill, proprioception of core, proximal hip and LE for self care, mobility, lifting, and ambulation/stair navigation      Manual Treatments:  PROM / STM / Oscillations-Mobs:  G-I, II, III, IV (PA's, Inf., Post.)  [] (43918) Provided manual therapy to mobilize LE, proximal hip and/or LS spine soft tissue/joints for the purpose of modulating pain, promoting relaxation,  increasing ROM, reducing/eliminating soft tissue swelling/inflammation/restriction, improving soft tissue extensibility and allowing for proper ROM for normal function with self care, mobility, lifting and ambulation. Modalities:  Ice at home. Charges:  Timed Code Treatment Minutes: 23   Total Treatment Minutes: 65       [] EVAL (LOW) 16839 (typically 20 minutes face-to-face)  [] EVAL (MOD) 15094 (typically 30 minutes face-to-face)  [] EVAL (HIGH) 08677 (typically 45 minutes face-to-face)  [] RE-EVAL   [x] PD(42924) x  1   [] IONTO  [] NMR (81586) x      [] VASO  [] Manual (17725) x       [] Other:  [x] TA x  1    [] Mech Traction (84068)  [] ES(attended) (17551)      [] ES (un) (82713):     GOALS:  Patient stated goal: get stronger and better function of right leg. Be able to run confidently and painfree.     Therapist goals for Patient:   Short Term Goals: To be achieved in: 2 weeks  1. Independent in HEP and progression per patient tolerance, in order to prevent re-injury. 2. Patient will have a decrease in pain to facilitate improvement in movement, function, and ADLs as indicated by Functional Deficits.     Long Term Goals: To be achieved in: 8 weeks  1.  Disability index score of 10% or less for the LEFS to assist with reaching prior level of function. 2. Patient will demonstrate increased AROM to WNL to allow for proper joint functioning as indicated by patients Functional Deficits. 3. Patient will demonstrate an increase in Strength to good proximal hip strength and control in LE to allow for proper functional mobility as indicated by patients Functional Deficits. 4. Patient will return to ADL and household functional activities without increased symptoms or restriction. 5. Pt will initiate the return to running and recreational activities with mild restrictions and symptoms. Progression Towards Functional goals:  [] Patient is progressing as expected towards functional goals listed. [] Progression is slowed due to complexities listed. [] Progression has been slowed due to co-morbidities. [x] Plan just implemented, too soon to assess goals progression  [] Other:     ASSESSMENT: Pt required VCs for proper hip positioning during ABD SLR, to keep hips stacked and leg in line with torso. Vcs to keep knee behind toes during 3 way slides as he tended to let knee drift over toes. Pt reported anterior knee pain with quantum knee extension. Continues to be challenged by SLS. Treatment/Activity Tolerance:  [x] Patient tolerated treatment well [] Patient limited by fatique  [] Patient limited by pain  [] Patient limited by other medical complications  [] Other:     Patient education:  HEP updated 4-5-19     Prognosis: [x] Good [] Fair  [] Poor    Patient Requires Follow-up: [x] Yes  [] No    PLAN: Stretching, strengthening, balance program. Consider BFR training. [x] Continue per plan of care [] Alter current plan (see comments)  [] Plan of care initiated [] Hold pending MD visit [] Discharge    Electronically signed by: Dana Hawthorne PT    Therapist was present, directed the patient's care, made skilled judgement, and was responsible for assessment and treatment of the patient. Triston Nurse, SPT

## 2019-04-25 NOTE — TELEPHONE ENCOUNTER
PA SUBMITTED FOR NIFEdipine ER Osmotic Release 90MG OR TB24  Key: TARIK HUTCHINSON Case ID: [id number] - Rx #: [id number]  STATUS: PENDING

## 2019-04-30 ENCOUNTER — OFFICE VISIT (OUTPATIENT)
Dept: INTERNAL MEDICINE CLINIC | Age: 54
End: 2019-04-30
Payer: COMMERCIAL

## 2019-04-30 ENCOUNTER — HOSPITAL ENCOUNTER (OUTPATIENT)
Dept: PHYSICAL THERAPY | Age: 54
Setting detail: THERAPIES SERIES
Discharge: HOME | End: 2019-04-30
Payer: COMMERCIAL

## 2019-04-30 VITALS
HEART RATE: 56 BPM | HEIGHT: 74 IN | OXYGEN SATURATION: 99 % | BODY MASS INDEX: 29.39 KG/M2 | RESPIRATION RATE: 16 BRPM | DIASTOLIC BLOOD PRESSURE: 98 MMHG | SYSTOLIC BLOOD PRESSURE: 140 MMHG | TEMPERATURE: 97.6 F | WEIGHT: 229 LBS

## 2019-04-30 DIAGNOSIS — I10 ESSENTIAL HYPERTENSION: Primary | ICD-10-CM

## 2019-04-30 PROCEDURE — 99213 OFFICE O/P EST LOW 20 MIN: CPT | Performed by: INTERNAL MEDICINE

## 2019-04-30 PROCEDURE — 97110 THERAPEUTIC EXERCISES: CPT | Performed by: PHYSICAL THERAPIST

## 2019-04-30 PROCEDURE — 97530 THERAPEUTIC ACTIVITIES: CPT | Performed by: PHYSICAL THERAPIST

## 2019-04-30 RX ORDER — NIFEDIPINE 90 MG/1
90 TABLET, EXTENDED RELEASE ORAL DAILY
Qty: 90 TABLET | Refills: 1 | Status: SHIPPED | OUTPATIENT
Start: 2019-04-30 | End: 2019-12-23 | Stop reason: SDUPTHER

## 2019-04-30 RX ORDER — LOSARTAN POTASSIUM AND HYDROCHLOROTHIAZIDE 12.5; 1 MG/1; MG/1
1 TABLET ORAL DAILY
Qty: 90 TABLET | Refills: 1 | Status: SHIPPED | OUTPATIENT
Start: 2019-04-30 | End: 2019-10-23

## 2019-04-30 SDOH — HEALTH STABILITY: MENTAL HEALTH: HOW OFTEN DO YOU HAVE A DRINK CONTAINING ALCOHOL?: 2-3 TIMES A WEEK

## 2019-04-30 SDOH — HEALTH STABILITY: MENTAL HEALTH: HOW MANY STANDARD DRINKS CONTAINING ALCOHOL DO YOU HAVE ON A TYPICAL DAY?: 3 OR 4

## 2019-04-30 ASSESSMENT — PATIENT HEALTH QUESTIONNAIRE - PHQ9
SUM OF ALL RESPONSES TO PHQ9 QUESTIONS 1 & 2: 0
1. LITTLE INTEREST OR PLEASURE IN DOING THINGS: 0
2. FEELING DOWN, DEPRESSED OR HOPELESS: 0
SUM OF ALL RESPONSES TO PHQ QUESTIONS 1-9: 0
SUM OF ALL RESPONSES TO PHQ QUESTIONS 1-9: 0

## 2019-04-30 NOTE — FLOWSHEET NOTE
The [address] [place] [address] [facility]    [facility] Therapy Daily Treatment Note  Date:  2019    Patient Name:  Emigdio Mirza    :  1965  MRN: [id number]  Restrictions/Precautions:    Medical/Treatment Diagnosis Information:  · Diagnosis: Right knee pain/PF pain  ·   ICD10: M25.561; M22.2X1  · Treatment Diagnosis: increased pain; decreased strength; decreased flexibility; increased swelling  Insurance/Certification information:  PT Insurance Information: [place]  Physician Information:  Referring Practitioner: Dr Inocente Booker of care signed (Y/N):     Date of Patient follow up with Physician:     G-Code (if applicable):      Date G-Code Applied:  19  PT G-Codes  Functional Assessment Tool Used: LEFS  Score: 75%    Progress Note: [x]  Yes  []  No  Next due by: Visit #10       Latex Allergy:  [x]NO      []YES  Preferred Language for Healthcare:   [x]English       []other:    Visit # Insurance Allowable    total 60     Pain level:   0/10    SUBJECTIVE:  Pt states his knee has been feeling good, no new complaints. Pt goal= run 2 miles, 3x/week. OBJECTIVE:   Observation: Quad/vmo= fair/poor/slow  Decreased patellar mobility  Poor HS flexibility  Pt has high BP. This may be a contraindication for BFR.  Need to discuss with the MD.   Test measurements:  Knee extension= -2 degree    RESTRICTIONS/PRECAUTIONS: PF pain    Exercises/Interventions:   Exercise/Equipment Resistance/Repetitions Other comments   Stretching     Hamstring [id number]    Towel Pull     Inclined Calf [id number]    Hip Flexion     ITB [id number]    Groin     Quad                    SLR     Supine 1# 3x10 VCs for proper technique   Abduction Clam shells 3x10 Blue band  1# 3 x 10    Adduction 1#  3x10    Prone     SLR+          Isometrics     Quad sets [id number] with a towel roll         Patellar Glides     Medial     Superior     Inferior          ROM     Sheet Pulls     Hang Weights     Passive Active     Weight Shift     Ankle Pumps                    CKC     Calf raises     Wall sits [id number]  Monitor PF pain <2-3/10   Step ups     1 leg stand [id number]    3-way sliders 5x each side Visual cues needed/occasional support   Squatting     CC TKE     Balance     bridges 3x10 Blue band     Side steps 2 laps  Blue TB   Wobble board 30 taps  1 x 30\" Fwd/back; side/side             PRE     Extension  RANGE:   Flexion  RANGE:        Quantum machines     Leg press  150#  3x10 ECC 90-10 degrees   Leg extension 50# 3 x 10 90-30 degrees   Leg curl 70# 3x10  0-90 degrees   Bike  Warm up recumbent   Manual interventions                     Therapeutic Exercise and NMR EXR  [x] (73672) Provided verbal/tactile cueing for activities related to strengthening, flexibility, endurance, ROM for improvements in LE, proximal hip, and core control with self care, mobility, lifting, ambulation.  [] (93540) Provided verbal/tactile cueing for activities related to improving balance, coordination, kinesthetic sense, posture, motor skill, proprioception  to assist with LE, proximal hip, and core control in self care, mobility, lifting, ambulation and eccentric single leg control.      NMR and Therapeutic Activities:    [x] (31772 or 83386) Provided verbal/tactile cueing for activities related to improving balance, coordination, kinesthetic sense, posture, motor skill, proprioception and motor activation to allow for proper function of core, proximal hip and LE with self care and ADLs  [] (10037) Gait Re-education- Provided training and instruction to the patient for proper LE, core and proximal hip recruitment and positioning and eccentric body weight control with ambulation re-education including up and down stairs     Home Exercise Program:    [x] (23247) Reviewed/Progressed HEP activities related to strengthening, flexibility, endurance, ROM of core, proximal hip and LE for functional self-care, mobility, lifting and ambulation/stair navigation   [x] (23056)Reviewed/Progressed HEP activities related to improving balance, coordination, kinesthetic sense, posture, motor skill, proprioception of core, proximal hip and LE for self care, mobility, lifting, and ambulation/stair navigation      Manual Treatments:  PROM / STM / Oscillations-Mobs:  G-I, II, III, IV (PA's, Inf., Post.)  [] (06546) Provided manual therapy to mobilize LE, proximal hip and/or LS spine soft tissue/joints for the purpose of modulating pain, promoting relaxation,  increasing ROM, reducing/eliminating soft tissue swelling/inflammation/restriction, improving soft tissue extensibility and allowing for proper ROM for normal function with self care, mobility, lifting and ambulation. Modalities:  Ice at home. Charges:  Timed Code Treatment Minutes: 23   Total Treatment Minutes: 67       [] EVAL (LOW) 42031 (typically 20 minutes face-to-face)  [] EVAL (MOD) 81189 (typically 30 minutes face-to-face)  [] EVAL (HIGH) 00199 (typically 45 minutes face-to-face)  [] RE-EVAL   [x] PV(69568) x  1   [] IONTO  [] NMR (92319) x      [] VASO  [] Manual (97109) x       [] Other:  [x] TA x  1    [] Mech Traction (97015)  [] ES(attended) (67782)      [] ES (un) (44034):     GOALS:  Patient stated goal: get stronger and better function of right leg. Be able to run confidently and painfree.     Therapist goals for Patient:   Short Term Goals: To be achieved in: 2 weeks  1. Independent in HEP and progression per patient tolerance, in order to prevent re-injury. 2. Patient will have a decrease in pain to facilitate improvement in movement, function, and ADLs as indicated by Functional Deficits.     Long Term Goals: To be achieved in: 8 weeks  1. Disability index score of 10% or less for the LEFS to assist with reaching prior level of function. 2. Patient will demonstrate increased AROM to WNL to allow for proper joint functioning as indicated by patients Functional Deficits.    3. Patient will demonstrate an increase in Strength to good proximal hip strength and control in LE to allow for proper functional mobility as indicated by patients Functional Deficits. 4. Patient will return to ADL and household functional activities without increased symptoms or restriction. 5. Pt will initiate the return to running and recreational activities with mild restrictions and symptoms. Progression Towards Functional goals:  [] Patient is progressing as expected towards functional goals listed. [] Progression is slowed due to complexities listed. [] Progression has been slowed due to co-morbidities. [x] Plan just implemented, too soon to assess goals progression  [] Other:     ASSESSMENT: Pt able to complete bridges with band this date. Completed session without pain. Able to increase weight on leg press. Requires PT follow up to address ROM, strength and functional mobility deficits. Treatment/Activity Tolerance:  [x] Patient tolerated treatment well [] Patient limited by fatique  [] Patient limited by pain  [] Patient limited by other medical complications  [] Other:     Patient education:  HEP updated 4-5-19     Prognosis: [x] Good [] Fair  [] Poor    Patient Requires Follow-up: [x] Yes  [] No    PLAN: Stretching, strengthening, balance program. Consider BFR training. [x] Continue per plan of care [] Alter current plan (see comments)  [] Plan of care initiated [] Hold pending MD visit [] Discharge    Electronically signed by: Pham Gu PT, DPT  Physical Therapist  [email or web address]  [email or web address]    *Note: If patient does not return for scheduled / recommended follow up visit, this note will serve as their discharge note from physical therapy.

## 2019-05-02 ENCOUNTER — HOSPITAL ENCOUNTER (OUTPATIENT)
Dept: PHYSICAL THERAPY | Age: 54
Setting detail: THERAPIES SERIES
Discharge: HOME | End: 2019-05-02
Payer: COMMERCIAL

## 2019-05-02 NOTE — FLOWSHEET NOTE
The [address]    Physical Therapy  Cancellation/No-show Note  Patient Name:  Maryan Fernandez  :  1965   Date:  2019  Cancelled visits to date: 0  No-shows to date: 0    For today's appointment patient:  [x]  Cancelled  []  Rescheduled appointment  []  No-show     Reason given by patient:  []  Patient ill  []  Conflicting appointment   []  No transportation    []  Conflict with work  []  No reason given  []  Other:     Comments:      Electronically signed by:  Angi Lopez PT

## 2019-10-23 ENCOUNTER — OFFICE VISIT (OUTPATIENT)
Dept: INTERNAL MEDICINE CLINIC | Age: 54
End: 2019-10-23
Payer: COMMERCIAL

## 2019-10-23 VITALS
BODY MASS INDEX: 29.46 KG/M2 | HEART RATE: 87 BPM | RESPIRATION RATE: 14 BRPM | WEIGHT: 231 LBS | SYSTOLIC BLOOD PRESSURE: 140 MMHG | OXYGEN SATURATION: 94 % | DIASTOLIC BLOOD PRESSURE: 80 MMHG

## 2019-10-23 DIAGNOSIS — R73.9 HYPERGLYCEMIA: ICD-10-CM

## 2019-10-23 DIAGNOSIS — Z12.5 SCREENING FOR MALIGNANT NEOPLASM OF PROSTATE: ICD-10-CM

## 2019-10-23 DIAGNOSIS — I10 ESSENTIAL HYPERTENSION: Primary | ICD-10-CM

## 2019-10-23 DIAGNOSIS — R13.10 DYSPHAGIA, UNSPECIFIED TYPE: ICD-10-CM

## 2019-10-23 PROCEDURE — 99214 OFFICE O/P EST MOD 30 MIN: CPT | Performed by: INTERNAL MEDICINE

## 2019-10-23 RX ORDER — LOSARTAN POTASSIUM AND HYDROCHLOROTHIAZIDE 25; 100 MG/1; MG/1
1 TABLET ORAL DAILY
Qty: 30 TABLET | Refills: 5 | Status: SHIPPED | OUTPATIENT
Start: 2019-10-23 | End: 2020-04-28

## 2019-10-23 RX ORDER — OMEPRAZOLE 40 MG/1
CAPSULE, DELAYED RELEASE ORAL
Qty: 30 CAPSULE | Refills: 5 | Status: SHIPPED | OUTPATIENT
Start: 2019-10-23 | End: 2021-05-20 | Stop reason: SDUPTHER

## 2019-10-23 ASSESSMENT — ENCOUNTER SYMPTOMS
VOMITING: 1
SORE THROAT: 0
TROUBLE SWALLOWING: 0
SHORTNESS OF BREATH: 0
DIARRHEA: 0
CHOKING: 1
RHINORRHEA: 0
NAUSEA: 0
COUGH: 0
ABDOMINAL PAIN: 0

## 2019-12-23 DIAGNOSIS — I10 ESSENTIAL HYPERTENSION: ICD-10-CM

## 2019-12-23 RX ORDER — NIFEDIPINE 90 MG/1
90 TABLET, EXTENDED RELEASE ORAL DAILY
Qty: 90 TABLET | Refills: 1 | Status: SHIPPED | OUTPATIENT
Start: 2019-12-23 | End: 2020-06-24

## 2020-04-28 RX ORDER — LOSARTAN POTASSIUM AND HYDROCHLOROTHIAZIDE 25; 100 MG/1; MG/1
TABLET ORAL
Qty: 30 TABLET | Refills: 4 | Status: SHIPPED | OUTPATIENT
Start: 2020-04-28 | End: 2020-09-28

## 2020-06-24 RX ORDER — NIFEDIPINE 90 MG/1
TABLET, EXTENDED RELEASE ORAL
Qty: 30 TABLET | Refills: 0 | Status: SHIPPED | OUTPATIENT
Start: 2020-06-24 | End: 2020-07-30

## 2020-07-30 RX ORDER — NIFEDIPINE 90 MG/1
TABLET, EXTENDED RELEASE ORAL
Qty: 30 TABLET | Refills: 5 | Status: SHIPPED | OUTPATIENT
Start: 2020-07-30 | End: 2021-01-26

## 2020-07-30 NOTE — TELEPHONE ENCOUNTER
Last appointment: 10/23/2019  Next appointment: 8/31/2020  Last refill: 06/24/2020 # 30 with no refills

## 2020-08-31 ENCOUNTER — OFFICE VISIT (OUTPATIENT)
Dept: INTERNAL MEDICINE CLINIC | Age: 55
End: 2020-08-31
Payer: COMMERCIAL

## 2020-08-31 ENCOUNTER — TELEPHONE (OUTPATIENT)
Dept: INTERNAL MEDICINE CLINIC | Age: 55
End: 2020-08-31

## 2020-08-31 VITALS
TEMPERATURE: 98.4 F | OXYGEN SATURATION: 98 % | SYSTOLIC BLOOD PRESSURE: 142 MMHG | DIASTOLIC BLOOD PRESSURE: 90 MMHG | WEIGHT: 237 LBS | BODY MASS INDEX: 30.22 KG/M2 | RESPIRATION RATE: 16 BRPM | HEART RATE: 88 BPM

## 2020-08-31 PROCEDURE — G0444 DEPRESSION SCREEN ANNUAL: HCPCS | Performed by: INTERNAL MEDICINE

## 2020-08-31 PROCEDURE — G8431 POS CLIN DEPRES SCRN F/U DOC: HCPCS | Performed by: INTERNAL MEDICINE

## 2020-08-31 PROCEDURE — 99214 OFFICE O/P EST MOD 30 MIN: CPT | Performed by: INTERNAL MEDICINE

## 2020-08-31 RX ORDER — PRAVASTATIN SODIUM 20 MG
20 TABLET ORAL DAILY
Qty: 90 TABLET | Refills: 1 | Status: SHIPPED | OUTPATIENT
Start: 2020-08-31 | End: 2021-02-24

## 2020-08-31 ASSESSMENT — PATIENT HEALTH QUESTIONNAIRE - PHQ9
SUM OF ALL RESPONSES TO PHQ QUESTIONS 1-9: 10
SUM OF ALL RESPONSES TO PHQ9 QUESTIONS 1 & 2: 4
9. THOUGHTS THAT YOU WOULD BE BETTER OFF DEAD, OR OF HURTING YOURSELF: 0
10. IF YOU CHECKED OFF ANY PROBLEMS, HOW DIFFICULT HAVE THESE PROBLEMS MADE IT FOR YOU TO DO YOUR WORK, TAKE CARE OF THINGS AT HOME, OR GET ALONG WITH OTHER PEOPLE: 1
SUM OF ALL RESPONSES TO PHQ QUESTIONS 1-9: 10
1. LITTLE INTEREST OR PLEASURE IN DOING THINGS: 2
4. FEELING TIRED OR HAVING LITTLE ENERGY: 2
2. FEELING DOWN, DEPRESSED OR HOPELESS: 2
7. TROUBLE CONCENTRATING ON THINGS, SUCH AS READING THE NEWSPAPER OR WATCHING TELEVISION: 0
3. TROUBLE FALLING OR STAYING ASLEEP: 2
8. MOVING OR SPEAKING SO SLOWLY THAT OTHER PEOPLE COULD HAVE NOTICED. OR THE OPPOSITE, BEING SO FIGETY OR RESTLESS THAT YOU HAVE BEEN MOVING AROUND A LOT MORE THAN USUAL: 0
6. FEELING BAD ABOUT YOURSELF - OR THAT YOU ARE A FAILURE OR HAVE LET YOURSELF OR YOUR FAMILY DOWN: 2
5. POOR APPETITE OR OVEREATING: 0

## 2020-08-31 NOTE — PROGRESS NOTES
for rash. Allergic/Immunologic: Negative for immunocompromised state. Neurological: Negative for dizziness, numbness and headaches. Hematological: Does not bruise/bleed easily. Psychiatric/Behavioral: Positive for dysphoric mood and sleep disturbance. The patient is not nervous/anxious. Prior to Visit Medications    Medication Sig Taking? Authorizing Provider   NIFEdipine (PROCARDIA XL) 90 MG extended release tablet TAKE ONE TABLET BY MOUTH DAILY  Carrie Izquierdo MD   losartan-hydrochlorothiazide (HYZAAR) 100-25 MG per tablet TAKE ONE TABLET BY MOUTH DAILY  Carrie Izquierdo MD   omeprazole (PRILOSEC) 40 MG delayed release capsule TAKE ONE CAPSULE BY MOUTH EVERY MORNING BEFORE BREAKFAST AS NEEDED (REFLUX)  Isaura Steward MD   ASPIRIN ADULT LOW STRENGTH 81 MG EC tablet TAKE ONE TABLET BY MOUTH DAILY  August Innocent, DO   Multiple Vitamins-Minerals (THERAPEUTIC MULTIVITAMIN-MINERALS) tablet Take 1 tablet by mouth daily  Historical Provider, MD        Social History     Tobacco Use    Smoking status: Never Smoker    Smokeless tobacco: Former User     Types: Chew   Substance Use Topics    Alcohol use: Yes     Frequency: 2-3 times a week     Drinks per session: 3 or 4     Binge frequency: Less than monthly     Comment: three times weekly    Drug use: No        Vitals:    08/31/20 1620 08/31/20 1629   BP: (!) 142/88 (!) 142/90   Site: Right Upper Arm Right Upper Arm   Position: Sitting Sitting   Cuff Size: Large Adult Large Adult   Pulse: 88  Comment: Regular    Resp: 16    Temp: 98.4 °F (36.9 °C)    TempSrc: Oral    SpO2: 98%  Comment: Room Air    Weight: 237 lb (107.5 kg)      Estimated body mass index is 30.22 kg/m² as calculated from the following:    Height as of 4/30/19: 6' 2.25\" (1.886 m). Weight as of this encounter: 237 lb (107.5 kg). Physical Exam  Vitals signs and nursing note reviewed. Constitutional:       General: He is not in acute distress. stop his low-dose aspirin.  - Lipid Panel; Future  - Comprehensive Metabolic Panel; Future    5. Screening for malignant neoplasm of prostate    - Psa screening; Future    6. Encounter for vitamin deficiency screening    - Vitamin D 25 Hydroxy    7. Screening for HIV without presence of risk factors  - HIV-1 AND HIV-2 ANTIBODIES; Future    8. GERD -we will try to obtain a copy of the EGD he had last year. -Return in about 3 months (around 11/30/2020). depression, HTN, fu lipids. Age and gender appropriate preventive health care needs were discussed with patient and addressed as needed. On the basis of positive PHQ-9 screening (PHQ-9 Total Score: 10), the following plan was implemented:he will contact me if wants to further address. Patient will follow-up in 3 months.

## 2020-08-31 NOTE — PATIENT INSTRUCTIONS
Stop aspirin     Add pravastatin    Labs fasting in October [address] appt needed      Work on weight loss and reduced salt to help bp

## 2020-09-01 ASSESSMENT — ENCOUNTER SYMPTOMS
SORE THROAT: 0
SHORTNESS OF BREATH: 0
RHINORRHEA: 0
TROUBLE SWALLOWING: 0
COUGH: 0
NAUSEA: 0
DIARRHEA: 0
ABDOMINAL PAIN: 0
BACK PAIN: 1

## 2020-09-29 RX ORDER — LOSARTAN POTASSIUM AND HYDROCHLOROTHIAZIDE 25; 100 MG/1; MG/1
TABLET ORAL
Qty: 30 TABLET | Refills: 5 | Status: SHIPPED | OUTPATIENT
Start: 2020-09-29 | End: 2021-03-29

## 2020-10-30 DIAGNOSIS — R73.9 HYPERGLYCEMIA: ICD-10-CM

## 2020-10-30 DIAGNOSIS — Z11.4 SCREENING FOR HIV WITHOUT PRESENCE OF RISK FACTORS: ICD-10-CM

## 2020-10-30 DIAGNOSIS — E78.5 HYPERLIPIDEMIA, UNSPECIFIED HYPERLIPIDEMIA TYPE: ICD-10-CM

## 2020-10-30 DIAGNOSIS — Z12.5 SCREENING FOR MALIGNANT NEOPLASM OF PROSTATE: ICD-10-CM

## 2020-10-30 LAB
A/G RATIO: 1.5 (ref 1.1–2.2)
ALBUMIN SERPL-MCNC: 5 G/DL (ref 3.4–5)
ALP BLD-CCNC: 50 U/L (ref 40–129)
ALT SERPL-CCNC: 49 U/L (ref 10–40)
ANION GAP SERPL CALCULATED.3IONS-SCNC: 18 MMOL/L (ref 3–16)
AST SERPL-CCNC: 31 U/L (ref 15–37)
BILIRUB SERPL-MCNC: 0.4 MG/DL (ref 0–1)
BUN BLDV-MCNC: 18 MG/DL (ref 7–20)
CALCIUM SERPL-MCNC: 10.8 MG/DL (ref 8.3–10.6)
CHLORIDE BLD-SCNC: 100 MMOL/L (ref 99–110)
CHOLESTEROL, TOTAL: 159 MG/DL (ref 0–199)
CO2: 24 MMOL/L (ref 21–32)
CREAT SERPL-MCNC: 1 MG/DL (ref 0.9–1.3)
GFR AFRICAN AMERICAN: >60
GFR NON-AFRICAN AMERICAN: >60
GLOBULIN: 3.3 G/DL
GLUCOSE BLD-MCNC: 113 MG/DL (ref 70–99)
HDLC SERPL-MCNC: 74 MG/DL (ref 40–60)
LDL CHOLESTEROL CALCULATED: 53 MG/DL
POTASSIUM SERPL-SCNC: 3.9 MMOL/L (ref 3.5–5.1)
PROSTATE SPECIFIC ANTIGEN: 0.7 NG/ML (ref 0–4)
SODIUM BLD-SCNC: 142 MMOL/L (ref 136–145)
TOTAL PROTEIN: 8.3 G/DL (ref 6.4–8.2)
TRIGL SERPL-MCNC: 159 MG/DL (ref 0–150)
VLDLC SERPL CALC-MCNC: 32 MG/DL

## 2020-10-31 LAB
ESTIMATED AVERAGE GLUCOSE: 145.6 MG/DL
HBA1C MFR BLD: 6.7 %
HIV AG/AB: NORMAL
HIV ANTIGEN: NORMAL
HIV-1 ANTIBODY: NORMAL
HIV-2 AB: NORMAL

## 2020-12-03 ENCOUNTER — OFFICE VISIT (OUTPATIENT)
Dept: INTERNAL MEDICINE CLINIC | Age: 55
End: 2020-12-03
Payer: COMMERCIAL

## 2020-12-03 VITALS
HEART RATE: 80 BPM | RESPIRATION RATE: 16 BRPM | BODY MASS INDEX: 29.84 KG/M2 | WEIGHT: 234 LBS | SYSTOLIC BLOOD PRESSURE: 126 MMHG | TEMPERATURE: 97.6 F | DIASTOLIC BLOOD PRESSURE: 76 MMHG

## 2020-12-03 LAB
CREATININE URINE: 147.7 MG/DL (ref 39–259)
MICROALBUMIN UR-MCNC: 6 MG/DL
MICROALBUMIN/CREAT UR-RTO: 40.6 MG/G (ref 0–30)

## 2020-12-03 PROCEDURE — 99214 OFFICE O/P EST MOD 30 MIN: CPT | Performed by: INTERNAL MEDICINE

## 2021-01-26 DIAGNOSIS — I10 ESSENTIAL HYPERTENSION: ICD-10-CM

## 2021-01-26 RX ORDER — NIFEDIPINE 90 MG/1
TABLET, EXTENDED RELEASE ORAL
Qty: 30 TABLET | Refills: 4 | Status: SHIPPED | OUTPATIENT
Start: 2021-01-26 | End: 2021-06-29

## 2021-02-10 ENCOUNTER — APPOINTMENT (OUTPATIENT)
Dept: GENERAL RADIOLOGY | Age: 56
DRG: 871 | End: 2021-02-10
Payer: COMMERCIAL

## 2021-02-10 ENCOUNTER — APPOINTMENT (OUTPATIENT)
Dept: CT IMAGING | Age: 56
DRG: 871 | End: 2021-02-10
Payer: COMMERCIAL

## 2021-02-10 ENCOUNTER — HOSPITAL ENCOUNTER (INPATIENT)
Age: 56
LOS: 4 days | Discharge: HOME | DRG: 871 | End: 2021-02-14
Attending: EMERGENCY MEDICINE | Admitting: HOSPITALIST
Payer: COMMERCIAL

## 2021-02-10 DIAGNOSIS — N12 PYELONEPHRITIS: ICD-10-CM

## 2021-02-10 DIAGNOSIS — A41.9 SEVERE SEPSIS (HCC): ICD-10-CM

## 2021-02-10 DIAGNOSIS — R65.20 SEVERE SEPSIS (HCC): ICD-10-CM

## 2021-02-10 DIAGNOSIS — N17.9 ACUTE RENAL FAILURE, UNSPECIFIED ACUTE RENAL FAILURE TYPE (HCC): Primary | ICD-10-CM

## 2021-02-10 LAB
A/G RATIO: 0.7 (ref 1.1–2.2)
ALBUMIN SERPL-MCNC: 3.6 G/DL (ref 3.4–5)
ALP BLD-CCNC: 62 U/L (ref 40–129)
ALT SERPL-CCNC: 24 U/L (ref 10–40)
ANION GAP SERPL CALCULATED.3IONS-SCNC: 17 MMOL/L (ref 3–16)
AST SERPL-CCNC: 22 U/L (ref 15–37)
BACTERIA: ABNORMAL /HPF
BANDED NEUTROPHILS RELATIVE PERCENT: 8 % (ref 0–7)
BASOPHILS ABSOLUTE: 0.2 K/UL (ref 0–0.2)
BASOPHILS RELATIVE PERCENT: 1 %
BILIRUB SERPL-MCNC: 0.6 MG/DL (ref 0–1)
BILIRUBIN URINE: NEGATIVE
BLOOD, URINE: ABNORMAL
BUN BLDV-MCNC: 41 MG/DL (ref 7–20)
CALCIUM SERPL-MCNC: 9 MG/DL (ref 8.3–10.6)
CHLORIDE BLD-SCNC: 90 MMOL/L (ref 99–110)
CLARITY: ABNORMAL
CO2: 24 MMOL/L (ref 21–32)
COLOR: ABNORMAL
COMMENT UA: ABNORMAL
CREAT SERPL-MCNC: 3.8 MG/DL (ref 0.9–1.3)
EOSINOPHILS ABSOLUTE: 0 K/UL (ref 0–0.6)
EOSINOPHILS RELATIVE PERCENT: 0 %
EPITHELIAL CELLS, UA: 1 /HPF (ref 0–5)
GFR AFRICAN AMERICAN: 20
GFR NON-AFRICAN AMERICAN: 17
GLOBULIN: 5 G/DL
GLUCOSE BLD-MCNC: 158 MG/DL (ref 70–99)
GLUCOSE URINE: NEGATIVE MG/DL
HCT VFR BLD CALC: 38.5 % (ref 40.5–52.5)
HEMOGLOBIN: 12.6 G/DL (ref 13.5–17.5)
HYALINE CASTS: 6 /LPF (ref 0–8)
KETONES, URINE: NEGATIVE MG/DL
LACTIC ACID: 1.5 MMOL/L (ref 0.4–2)
LEUKOCYTE ESTERASE, URINE: ABNORMAL
LYMPHOCYTES ABSOLUTE: 2.2 K/UL (ref 1–5.1)
LYMPHOCYTES RELATIVE PERCENT: 9 %
MAGNESIUM: 2.1 MG/DL (ref 1.8–2.4)
MCH RBC QN AUTO: 28.7 PG (ref 26–34)
MCHC RBC AUTO-ENTMCNC: 32.8 G/DL (ref 31–36)
MCV RBC AUTO: 87.5 FL (ref 80–100)
MICROSCOPIC EXAMINATION: YES
MONOCYTES ABSOLUTE: 1.7 K/UL (ref 0–1.3)
MONOCYTES RELATIVE PERCENT: 7 %
NEUTROPHILS ABSOLUTE: 20.6 K/UL (ref 1.7–7.7)
NEUTROPHILS RELATIVE PERCENT: 75 %
NITRITE, URINE: NEGATIVE
PDW BLD-RTO: 14.1 % (ref 12.4–15.4)
PH UA: 6 (ref 5–8)
PLATELET # BLD: 451 K/UL (ref 135–450)
PLATELET SLIDE REVIEW: ABNORMAL
PMV BLD AUTO: 7 FL (ref 5–10.5)
POTASSIUM REFLEX MAGNESIUM: 3.1 MMOL/L (ref 3.5–5.1)
PROTEIN UA: 100 MG/DL
RAPID INFLUENZA  B AGN: NEGATIVE
RAPID INFLUENZA A AGN: NEGATIVE
RBC # BLD: 4.4 M/UL (ref 4.2–5.9)
RBC # BLD: NORMAL 10*6/UL
RBC UA: 25 /HPF (ref 0–4)
SLIDE REVIEW: ABNORMAL
SODIUM BLD-SCNC: 131 MMOL/L (ref 136–145)
SPECIFIC GRAVITY UA: 1.01 (ref 1–1.03)
TOTAL PROTEIN: 8.6 G/DL (ref 6.4–8.2)
URINE REFLEX TO CULTURE: YES
URINE TYPE: ABNORMAL
UROBILINOGEN, URINE: 1 E.U./DL
WBC # BLD: 24.8 K/UL (ref 4–11)
WBC UA: >900 /HPF (ref 0–5)

## 2021-02-10 PROCEDURE — 99284 EMERGENCY DEPT VISIT MOD MDM: CPT

## 2021-02-10 PROCEDURE — 84443 ASSAY THYROID STIM HORMONE: CPT

## 2021-02-10 PROCEDURE — 83735 ASSAY OF MAGNESIUM: CPT

## 2021-02-10 PROCEDURE — U0003 INFECTIOUS AGENT DETECTION BY NUCLEIC ACID (DNA OR RNA); SEVERE ACUTE RESPIRATORY SYNDROME CORONAVIRUS 2 (SARS-COV-2) (CORONAVIRUS DISEASE [COVID-19]), AMPLIFIED PROBE TECHNIQUE, MAKING USE OF HIGH THROUGHPUT TECHNOLOGIES AS DESCRIBED BY CMS-2020-01-R: HCPCS

## 2021-02-10 PROCEDURE — 81001 URINALYSIS AUTO W/SCOPE: CPT

## 2021-02-10 PROCEDURE — 1200000000 HC SEMI PRIVATE

## 2021-02-10 PROCEDURE — 83605 ASSAY OF LACTIC ACID: CPT

## 2021-02-10 PROCEDURE — 83036 HEMOGLOBIN GLYCOSYLATED A1C: CPT

## 2021-02-10 PROCEDURE — 87040 BLOOD CULTURE FOR BACTERIA: CPT

## 2021-02-10 PROCEDURE — 2580000003 HC RX 258: Performed by: PHYSICIAN ASSISTANT

## 2021-02-10 PROCEDURE — 87186 SC STD MICRODIL/AGAR DIL: CPT

## 2021-02-10 PROCEDURE — 87804 INFLUENZA ASSAY W/OPTIC: CPT

## 2021-02-10 PROCEDURE — 96360 HYDRATION IV INFUSION INIT: CPT

## 2021-02-10 PROCEDURE — 87088 URINE BACTERIA CULTURE: CPT

## 2021-02-10 PROCEDURE — 71045 X-RAY EXAM CHEST 1 VIEW: CPT

## 2021-02-10 PROCEDURE — 80053 COMPREHEN METABOLIC PANEL: CPT

## 2021-02-10 PROCEDURE — 84550 ASSAY OF BLOOD/URIC ACID: CPT

## 2021-02-10 PROCEDURE — 51702 INSERT TEMP BLADDER CATH: CPT

## 2021-02-10 PROCEDURE — 87077 CULTURE AEROBIC IDENTIFY: CPT

## 2021-02-10 PROCEDURE — 85025 COMPLETE CBC W/AUTO DIFF WBC: CPT

## 2021-02-10 PROCEDURE — 74176 CT ABD & PELVIS W/O CONTRAST: CPT

## 2021-02-10 PROCEDURE — 87086 URINE CULTURE/COLONY COUNT: CPT

## 2021-02-10 PROCEDURE — 6360000002 HC RX W HCPCS: Performed by: PHYSICIAN ASSISTANT

## 2021-02-10 RX ORDER — 0.9 % SODIUM CHLORIDE 0.9 %
1000 INTRAVENOUS SOLUTION INTRAVENOUS ONCE
Status: COMPLETED | OUTPATIENT
Start: 2021-02-10 | End: 2021-02-10

## 2021-02-10 RX ORDER — 0.9 % SODIUM CHLORIDE 0.9 %
30 INTRAVENOUS SOLUTION INTRAVENOUS ONCE
Status: COMPLETED | OUTPATIENT
Start: 2021-02-10 | End: 2021-02-11

## 2021-02-10 RX ADMIN — CEFEPIME HYDROCHLORIDE 2000 MG: 2 INJECTION, POWDER, FOR SOLUTION INTRAVENOUS at 23:14

## 2021-02-10 RX ADMIN — SODIUM CHLORIDE 1000 ML: 9 INJECTION, SOLUTION INTRAVENOUS at 20:27

## 2021-02-10 RX ADMIN — SODIUM CHLORIDE 1000 ML: 9 INJECTION, SOLUTION INTRAVENOUS at 22:37

## 2021-02-10 ASSESSMENT — ENCOUNTER SYMPTOMS
VOMITING: 0
COUGH: 1
NAUSEA: 0
ABDOMINAL PAIN: 0
SHORTNESS OF BREATH: 0

## 2021-02-11 LAB
ALBUMIN SERPL-MCNC: 3 G/DL (ref 3.4–5)
ANION GAP SERPL CALCULATED.3IONS-SCNC: 10 MMOL/L (ref 3–16)
BUN BLDV-MCNC: 44 MG/DL (ref 7–20)
CALCIUM SERPL-MCNC: 8.4 MG/DL (ref 8.3–10.6)
CHLORIDE BLD-SCNC: 97 MMOL/L (ref 99–110)
CHLORIDE URINE RANDOM: 27 MMOL/L
CO2: 24 MMOL/L (ref 21–32)
CREAT SERPL-MCNC: 2.8 MG/DL (ref 0.9–1.3)
CREATININE URINE: 107 MG/DL (ref 39–259)
CREATININE URINE: 108.8 MG/DL (ref 39–259)
ESTIMATED AVERAGE GLUCOSE: 131.2 MG/DL
GFR AFRICAN AMERICAN: 29
GFR NON-AFRICAN AMERICAN: 24
GLUCOSE BLD-MCNC: 112 MG/DL (ref 70–99)
GLUCOSE BLD-MCNC: 123 MG/DL (ref 70–99)
GLUCOSE BLD-MCNC: 133 MG/DL (ref 70–99)
GLUCOSE BLD-MCNC: 139 MG/DL (ref 70–99)
GLUCOSE BLD-MCNC: 153 MG/DL (ref 70–99)
GLUCOSE BLD-MCNC: 156 MG/DL (ref 70–99)
GLUCOSE BLD-MCNC: 176 MG/DL (ref 70–99)
HBA1C MFR BLD: 6.2 %
HCT VFR BLD CALC: 35.6 % (ref 40.5–52.5)
HEMOGLOBIN: 11.6 G/DL (ref 13.5–17.5)
MAGNESIUM: 2.3 MG/DL (ref 1.8–2.4)
MAGNESIUM: 2.3 MG/DL (ref 1.8–2.4)
MCH RBC QN AUTO: 28.6 PG (ref 26–34)
MCHC RBC AUTO-ENTMCNC: 32.7 G/DL (ref 31–36)
MCV RBC AUTO: 87.5 FL (ref 80–100)
OSMOLALITY URINE: 300 MOSM/KG (ref 390–1070)
PDW BLD-RTO: 13.7 % (ref 12.4–15.4)
PERFORMED ON: ABNORMAL
PHOSPHORUS: 3.3 MG/DL (ref 2.5–4.9)
PLATELET # BLD: 410 K/UL (ref 135–450)
PMV BLD AUTO: 7.7 FL (ref 5–10.5)
POTASSIUM SERPL-SCNC: 3 MMOL/L (ref 3.5–5.1)
POTASSIUM, UR: 25.8 MMOL/L
RBC # BLD: 4.07 M/UL (ref 4.2–5.9)
SARS-COV-2, PCR: NOT DETECTED
SODIUM BLD-SCNC: 131 MMOL/L (ref 136–145)
SODIUM URINE: 36 MMOL/L
SODIUM URINE: 36 MMOL/L
TOTAL CK: 154 U/L (ref 39–308)
TSH REFLEX: 1.92 UIU/ML (ref 0.27–4.2)
UREA NITROGEN, UR: 441.8 MG/DL (ref 800–1666)
URIC ACID, SERUM: 9.9 MG/DL (ref 3.5–7.2)
WBC # BLD: 20.4 K/UL (ref 4–11)

## 2021-02-11 PROCEDURE — 6370000000 HC RX 637 (ALT 250 FOR IP): Performed by: PHYSICIAN ASSISTANT

## 2021-02-11 PROCEDURE — 2580000003 HC RX 258: Performed by: PHYSICIAN ASSISTANT

## 2021-02-11 PROCEDURE — 36415 COLL VENOUS BLD VENIPUNCTURE: CPT

## 2021-02-11 PROCEDURE — 84153 ASSAY OF PSA TOTAL: CPT

## 2021-02-11 PROCEDURE — 6360000002 HC RX W HCPCS: Performed by: PHYSICIAN ASSISTANT

## 2021-02-11 PROCEDURE — 82570 ASSAY OF URINE CREATININE: CPT

## 2021-02-11 PROCEDURE — 84154 ASSAY OF PSA FREE: CPT

## 2021-02-11 PROCEDURE — 94761 N-INVAS EAR/PLS OXIMETRY MLT: CPT

## 2021-02-11 PROCEDURE — 82550 ASSAY OF CK (CPK): CPT

## 2021-02-11 PROCEDURE — 84540 ASSAY OF URINE/UREA-N: CPT

## 2021-02-11 PROCEDURE — 83935 ASSAY OF URINE OSMOLALITY: CPT

## 2021-02-11 PROCEDURE — 84133 ASSAY OF URINE POTASSIUM: CPT

## 2021-02-11 PROCEDURE — 94640 AIRWAY INHALATION TREATMENT: CPT

## 2021-02-11 PROCEDURE — 82436 ASSAY OF URINE CHLORIDE: CPT

## 2021-02-11 PROCEDURE — 80069 RENAL FUNCTION PANEL: CPT

## 2021-02-11 PROCEDURE — 1200000000 HC SEMI PRIVATE

## 2021-02-11 PROCEDURE — 83735 ASSAY OF MAGNESIUM: CPT

## 2021-02-11 PROCEDURE — 85027 COMPLETE CBC AUTOMATED: CPT

## 2021-02-11 PROCEDURE — 2700000000 HC OXYGEN THERAPY PER DAY

## 2021-02-11 PROCEDURE — 84300 ASSAY OF URINE SODIUM: CPT

## 2021-02-11 RX ORDER — SODIUM CHLORIDE 0.9 % (FLUSH) 0.9 %
10 SYRINGE (ML) INJECTION PRN
Status: DISCONTINUED | OUTPATIENT
Start: 2021-02-11 | End: 2021-02-14 | Stop reason: HOSPADM

## 2021-02-11 RX ORDER — DEXTROSE MONOHYDRATE 50 MG/ML
100 INJECTION, SOLUTION INTRAVENOUS PRN
Status: DISCONTINUED | OUTPATIENT
Start: 2021-02-11 | End: 2021-02-14 | Stop reason: HOSPADM

## 2021-02-11 RX ORDER — POTASSIUM CHLORIDE 20 MEQ/1
40 TABLET, EXTENDED RELEASE ORAL ONCE
Status: COMPLETED | OUTPATIENT
Start: 2021-02-11 | End: 2021-02-11

## 2021-02-11 RX ORDER — INSULIN LISPRO 100 [IU]/ML
0-3 INJECTION, SOLUTION INTRAVENOUS; SUBCUTANEOUS NIGHTLY
Status: DISCONTINUED | OUTPATIENT
Start: 2021-02-11 | End: 2021-02-14 | Stop reason: HOSPADM

## 2021-02-11 RX ORDER — PRAVASTATIN SODIUM 20 MG
20 TABLET ORAL DAILY
Status: DISCONTINUED | OUTPATIENT
Start: 2021-02-11 | End: 2021-02-14 | Stop reason: HOSPADM

## 2021-02-11 RX ORDER — HEPARIN SODIUM 5000 [USP'U]/ML
5000 INJECTION, SOLUTION INTRAVENOUS; SUBCUTANEOUS EVERY 8 HOURS SCHEDULED
Status: DISCONTINUED | OUTPATIENT
Start: 2021-02-11 | End: 2021-02-14 | Stop reason: HOSPADM

## 2021-02-11 RX ORDER — NIFEDIPINE 30 MG/1
30 TABLET, EXTENDED RELEASE ORAL DAILY
Status: DISCONTINUED | OUTPATIENT
Start: 2021-02-12 | End: 2021-02-11

## 2021-02-11 RX ORDER — SODIUM CHLORIDE 0.9 % (FLUSH) 0.9 %
10 SYRINGE (ML) INJECTION EVERY 12 HOURS SCHEDULED
Status: DISCONTINUED | OUTPATIENT
Start: 2021-02-11 | End: 2021-02-14 | Stop reason: HOSPADM

## 2021-02-11 RX ORDER — LACTOBACILLUS RHAMNOSUS GG 10B CELL
1 CAPSULE ORAL 2 TIMES DAILY WITH MEALS
Status: DISCONTINUED | OUTPATIENT
Start: 2021-02-11 | End: 2021-02-14 | Stop reason: HOSPADM

## 2021-02-11 RX ORDER — ALBUTEROL SULFATE 90 UG/1
2 AEROSOL, METERED RESPIRATORY (INHALATION) EVERY 6 HOURS PRN
Status: DISCONTINUED | OUTPATIENT
Start: 2021-02-11 | End: 2021-02-14 | Stop reason: HOSPADM

## 2021-02-11 RX ORDER — INSULIN LISPRO 100 [IU]/ML
0-6 INJECTION, SOLUTION INTRAVENOUS; SUBCUTANEOUS
Status: DISCONTINUED | OUTPATIENT
Start: 2021-02-11 | End: 2021-02-14 | Stop reason: HOSPADM

## 2021-02-11 RX ORDER — NICOTINE POLACRILEX 4 MG
15 LOZENGE BUCCAL PRN
Status: DISCONTINUED | OUTPATIENT
Start: 2021-02-11 | End: 2021-02-14 | Stop reason: HOSPADM

## 2021-02-11 RX ORDER — ACETAMINOPHEN 500 MG
1000 TABLET ORAL EVERY 6 HOURS PRN
Status: DISCONTINUED | OUTPATIENT
Start: 2021-02-11 | End: 2021-02-14 | Stop reason: HOSPADM

## 2021-02-11 RX ORDER — BENZONATATE 100 MG/1
100 CAPSULE ORAL 3 TIMES DAILY PRN
Status: DISCONTINUED | OUTPATIENT
Start: 2021-02-11 | End: 2021-02-14 | Stop reason: HOSPADM

## 2021-02-11 RX ORDER — SODIUM CHLORIDE 9 MG/ML
INJECTION, SOLUTION INTRAVENOUS CONTINUOUS
Status: DISCONTINUED | OUTPATIENT
Start: 2021-02-11 | End: 2021-02-13

## 2021-02-11 RX ORDER — DEXTROSE MONOHYDRATE 25 G/50ML
12.5 INJECTION, SOLUTION INTRAVENOUS PRN
Status: DISCONTINUED | OUTPATIENT
Start: 2021-02-11 | End: 2021-02-14 | Stop reason: HOSPADM

## 2021-02-11 RX ORDER — NIFEDIPINE 30 MG/1
90 TABLET, EXTENDED RELEASE ORAL DAILY
Status: DISCONTINUED | OUTPATIENT
Start: 2021-02-11 | End: 2021-02-11

## 2021-02-11 RX ORDER — ALBUTEROL SULFATE 90 UG/1
2 AEROSOL, METERED RESPIRATORY (INHALATION) 4 TIMES DAILY
Status: DISCONTINUED | OUTPATIENT
Start: 2021-02-11 | End: 2021-02-12

## 2021-02-11 RX ADMIN — CEFEPIME HYDROCHLORIDE 1000 MG: 1 INJECTION, POWDER, FOR SOLUTION INTRAMUSCULAR; INTRAVENOUS at 22:17

## 2021-02-11 RX ADMIN — HEPARIN SODIUM 5000 UNITS: 5000 INJECTION INTRAVENOUS; SUBCUTANEOUS at 05:24

## 2021-02-11 RX ADMIN — Medication 10 ML: at 22:17

## 2021-02-11 RX ADMIN — SODIUM CHLORIDE: 9 INJECTION, SOLUTION INTRAVENOUS at 22:21

## 2021-02-11 RX ADMIN — BENZONATATE 100 MG: 100 CAPSULE ORAL at 00:56

## 2021-02-11 RX ADMIN — HEPARIN SODIUM 5000 UNITS: 5000 INJECTION INTRAVENOUS; SUBCUTANEOUS at 12:42

## 2021-02-11 RX ADMIN — SODIUM CHLORIDE: 9 INJECTION, SOLUTION INTRAVENOUS at 00:24

## 2021-02-11 RX ADMIN — Medication 2 PUFF: at 16:46

## 2021-02-11 RX ADMIN — BENZONATATE 100 MG: 100 CAPSULE ORAL at 22:21

## 2021-02-11 RX ADMIN — HEPARIN SODIUM 5000 UNITS: 5000 INJECTION INTRAVENOUS; SUBCUTANEOUS at 22:17

## 2021-02-11 RX ADMIN — Medication 2 PUFF: at 20:46

## 2021-02-11 RX ADMIN — Medication 2 PUFF: at 20:47

## 2021-02-11 RX ADMIN — ACETAMINOPHEN 1000 MG: 500 TABLET ORAL at 22:21

## 2021-02-11 RX ADMIN — Medication 2 PUFF: at 09:04

## 2021-02-11 RX ADMIN — BENZONATATE 100 MG: 100 CAPSULE ORAL at 09:08

## 2021-02-11 RX ADMIN — HEPARIN SODIUM 5000 UNITS: 5000 INJECTION INTRAVENOUS; SUBCUTANEOUS at 00:54

## 2021-02-11 RX ADMIN — ACETAMINOPHEN 1000 MG: 500 TABLET ORAL at 00:32

## 2021-02-11 RX ADMIN — Medication 1 CAPSULE: at 09:07

## 2021-02-11 RX ADMIN — Medication 2 PUFF: at 12:16

## 2021-02-11 RX ADMIN — NIFEDIPINE 90 MG: 30 TABLET, FILM COATED, EXTENDED RELEASE ORAL at 09:07

## 2021-02-11 RX ADMIN — Medication 1 CAPSULE: at 16:42

## 2021-02-11 RX ADMIN — POTASSIUM CHLORIDE 40 MEQ: 1500 TABLET, EXTENDED RELEASE ORAL at 00:43

## 2021-02-11 RX ADMIN — Medication 2 PUFF: at 12:15

## 2021-02-11 RX ADMIN — PRAVASTATIN SODIUM 20 MG: 20 TABLET ORAL at 09:08

## 2021-02-11 ASSESSMENT — PAIN SCALES - GENERAL
PAINLEVEL_OUTOF10: 0
PAINLEVEL_OUTOF10: 2

## 2021-02-11 NOTE — PROGRESS NOTES
Nephrology note:     Consult received - full consult note to follow. Thank you for allowing us to participate in this patients care. Please do not hesitate to contact me, if any questions/concerns. We will follow along with you. Raul Travis MD  [facility] [address] [place] [address]   [phone] or [address].

## 2021-02-11 NOTE — PROGRESS NOTES
without deformity. Skin: Skin color, texture, turgor normal.  No rashes or lesions. Neurologic:  Neurovascularly intact without any focal sensory/motor deficits. Cranial nerves: II-XII intact, grossly non-focal.  Psychiatric: Alert and oriented, thought content appropriate, normal insight  Capillary Refill: Brisk,< 3 seconds   Peripheral Pulses: +2 palpable, equal bilaterally       Labs:   Recent Labs     02/10/21  1923 02/11/21  0416   WBC 24.8* 20.4*   HGB 12.6* 11.6*   HCT 38.5* 35.6*   * 410     Recent Labs     02/10/21  1923 02/11/21  0416   * 131*   K 3.1* 3.0*   CL 90* 97*   CO2 24 24   BUN 41* 44*   CREATININE 3.8* 2.8*   CALCIUM 9.0 8.4   PHOS  --  3.3     Recent Labs     02/10/21  1923   AST 22   ALT 24   BILITOT 0.6   ALKPHOS 62     No results for input(s): INR in the last 72 hours. Recent Labs     02/11/21 0415   CKTOTAL 154       Urinalysis:      Lab Results   Component Value Date    NITRU Negative 02/10/2021    WBCUA >900 02/10/2021    BACTERIA 4+ 02/10/2021    RBCUA 25 02/10/2021    BLOODU LARGE 02/10/2021    SPECGRAV 1.014 02/10/2021    GLUCOSEU Negative 02/10/2021       Radiology:  CT ABDOMEN PELVIS WO CONTRAST Additional Contrast? None   Final Result   Moderate perirenal stranding around both kidneys mild stranding and minimal   fluid in the pararenal space which is more prominent on the left and extends   inferiorly along the psoas muscles with no focal fluid collection or mass. This could represent inflammatory changes from bilateral pyelonephritis or   possible perirenal scarring. No hydronephrosis or urinary obstruction is   seen. Recommend clinical follow-up. Mild subsegmental atelectasis or early infiltrates along the lung bases. Small hiatal hernia. Mild chronic liver changes with fatty replacement throughout      Small air-fluid levels throughout the bowel which could represent early   ileus. Recommend follow-up. Mild constipation.       Mild prostatic enlargement. XR CHEST PORTABLE   Final Result   No acute cardiopulmonary disease. Assessment/Plan:    Active Hospital Problems    Diagnosis    SILVIA (acute kidney injury) ([facility] [address] [N17.9]       Acute kidney injury: Secondary to pyelonephritis and Hyzaar. Holding nephrotoxic's. On IV fluids per nephrology. Creatinine slowly trending down. CK within normal meds. Continue IV fluid hydration. Bilateral pyelonephritis: Urine cultures pending. Blood cultures drawn as well. Continue cefepime. Continue IV fluids. Sepsis present on admission: Presented with fever, tachycardia, leukocytosis, tachypnea. Secondary UTI. On IV fluids. Cough: Chest x-ray clear. COVID-19 negative. Discontinue droplet isolation. Hypertension: Procardia. Stop Hyzaar. Diabetes mellitus type 2: A1c 6.7. Sliding scale insulin here. Hyperlipidemia: Statin    Prostatic enlargement: Noted on CT. Check PSA. DVT Prophylaxis: Heparin  Diet: DIET CARB CONTROL;  Renal  Code Status: Full Code    PT/OT Eval Status: Not needed    Dispo - inpatient 1 to 2 days    Jose Sweeney MD

## 2021-02-11 NOTE — H&P
Hospital Medicine History & Physical      Patient Name: Dia Fontana    : 1965    PCP: Chirag Judd MD    Date of Service:  Patient seen and examined on 2/10/2021     Chief Complaint:  Pain with urination, fever    History Of Present Illness:    Dia Fontana is a 50 y.o. male who presented to ED with complaint of pain with urination which started 3 days ago. Patient reports he had some hematuria in December which resolved without intervention and has had increased urinary frequency at night for the last couple months. Patient reports cough, nasal congestion, and fever (up to 103) for the last 3 days. He has been taking over the counter cough and cold medicine with temporary improvement of fever, cough, and congestion. He reports associated chills and fatigue. He denies dizziness, chest pain, edema, abdominal pain, nausea, vomiting, diarrhea, constipation. He denies any history of asthma, COPD, kidney failure, UTIs. He denies any history of smoking. Imaging performed in ED reviewed and notable for CT abdomen with moderate perirenal stranding around both kidneys without hydronephrosis or urinary obstruction. Labs performed in ED reviewed and notable for Na 131, K 3.1, Cr 3.8 (baseline 1.0), WBC 24.8. UA notable for >900 WBC, 25 RBC, 4+ bacteria. Past Medical History:    Patient has a past medical history of Arthritis, Diabetes mellitus type 2, controlled ([address] Dysphagia, and Hypertension. Past Surgical History:    Patient has a past surgical history that includes knee surgery; Colonoscopy (2017); and Upper gastrointestinal endoscopy. Medications Prior to Admission:      Prior to Admission medications    Medication Sig Start Date End Date Taking?  Authorizing Provider   NIFEdipine (PROCARDIA XL) 90 MG extended release tablet TAKE ONE TABLET BY MOUTH DAILY 21  Yes Carrie Kenny MD   losartan-hydroCHLOROthiazide (HYZAAR) 100-25 MG per tablet any focal sensory/motor deficits. Cranial nerves: II-XII intact, grossly non-focal. Alert and oriented x 3. No slurred speech. No facial droop. Psychiatric:  Thought content appropriate, normal insight. Labs:   CBC   Recent Labs     02/10/21  1923   WBC 24.8*   HGB 12.6*   HCT 38.5*   *        RENAL  Recent Labs     02/10/21  1923   *   K 3.1*   CL 90*   CO2 24   BUN 41*   CREATININE 3.8*       LFTS  Recent Labs     02/10/21  1923   AST 22   ALT 24   BILITOT 0.6   ALKPHOS 62       COAG  No results for input(s): INR in the last 72 hours. CARDIAC ENZYMES  No results for input(s): TROPONINI in the last 72 hours. LIPIDS  Cholesterol, Total   Date/Time Value Ref Range Status   10/30/2020 08:50  0 - 199 mg/dL Final     Triglycerides   Date/Time Value Ref Range Status   10/30/2020 08:50  (H) 0 - 150 mg/dL Final     HDL   Date/Time Value Ref Range Status   10/30/2020 08:50 AM 74 (H) 40 - 60 mg/dL Final     LDL Calculated   Date/Time Value Ref Range Status   10/30/2020 08:50 AM 53 <100 mg/dL Final         Radiology:     CT ABDOMEN PELVIS WO CONTRAST Additional Contrast? None   Final Result   Moderate perirenal stranding around both kidneys mild stranding and minimal   fluid in the pararenal space which is more prominent on the left and extends   inferiorly along the psoas muscles with no focal fluid collection or mass. This could represent inflammatory changes from bilateral pyelonephritis or   possible perirenal scarring. No hydronephrosis or urinary obstruction is   seen. Recommend clinical follow-up. Mild subsegmental atelectasis or early infiltrates along the lung bases. Small hiatal hernia. Mild chronic liver changes with fatty replacement throughout      Small air-fluid levels throughout the bowel which could represent early   ileus. Recommend follow-up. Mild constipation. Mild prostatic enlargement.          XR CHEST PORTABLE   Final Result   No acute

## 2021-02-11 NOTE — ED PROVIDER NOTES
[facility] Emergency Department      Pt Name: Judson Milner  MRN: [id number]  [place] 1965  Date of evaluation: 2/10/2021  Provider: Tyrone Olivera MD  I independently performed a history and physical on Judson Milner. All diagnostic, treatment, and disposition decisions were made by myself in conjunction with the advanced practice provider. HPI: Judson Milner presented with   Chief Complaint   Patient presents with    Urinary Frequency     Increase in needing to use the bathroom, urine with with an odor. Chills and feverr, fatigue     Judson Milner has a past medical history of Arthritis, Diabetes mellitus type 2, controlled ([address] Dysphagia, and Hypertension. He has a past surgical history that includes knee surgery; Colonoscopy (07/2017); and Upper gastrointestinal endoscopy. No current facility-administered medications on file prior to encounter.       Current Outpatient Medications on File Prior to Encounter   Medication Sig Dispense Refill    NIFEdipine (PROCARDIA XL) 90 MG extended release tablet TAKE ONE TABLET BY MOUTH DAILY 30 tablet 4    losartan-hydroCHLOROthiazide (HYZAAR) 100-25 MG per tablet TAKE ONE TABLET BY MOUTH DAILY 30 tablet 5    pravastatin (PRAVACHOL) 20 MG tablet Take 1 tablet by mouth daily 90 tablet 1    Multiple Vitamins-Minerals (THERAPEUTIC MULTIVITAMIN-MINERALS) tablet Take 1 tablet by mouth daily      omeprazole (PRILOSEC) 40 MG delayed release capsule TAKE ONE CAPSULE BY MOUTH EVERY MORNING BEFORE BREAKFAST AS NEEDED (REFLUX) 30 capsule 5     PHYSICAL EXAM  Vitals: /51   Pulse 119   Temp 97.8 F   Resp 16   Wt 222 lb 8 oz (100.9 kg)   SpO2 93%   BMI 28.38 kg/m²   Constitutional:  50 y.o. male alert, cooperative  HENT:  Atraumatic scalp, mucous membranes moist  Eyes:   Conjunctiva clear, no icterus  Neck:  Supple, no visible JVD, no signs of injury  Cardiovascular:  Regular, no rubs  Thorax & Lungs:  No accessory muscle usage, clear  Abdomen: Soft, non distended, NT  Back:  No deformity  Genitalia:  Deferred  Rectal:  Deferred  Extremities:  No cyanosis, no edema, adequate perfusion  Skin:  Warm, dry  Neurologic:  Alert, no slurred speech  Psychiatric:  Affect appropriate    Medical Decision Making and Plan:  Briefly, this is an 50 y.o.male who presented with urinary frequency, fever, fatigue. Findings of sepsis from UTI, pyelonephritis, SILVIA. IV abx and IVF. Plan is to admit for further care. For further details of [address] Emergency Department encounter, please see documentation by advanced practice provider Flaco Ordonez PA-C.      Labs Reviewed   CBC WITH AUTO DIFFERENTIAL - Abnormal; Notable for the following components:       Result Value    WBC 24.8 (*)     Hemoglobin 12.6 (*)     Hematocrit 38.5 (*)     Platelets 441 (*)     Neutrophils Absolute 20.6 (*)     Monocytes Absolute 1.7 (*)     Bands Relative 8 (*)     All other components within normal limits    Narrative:     Performed at:                  [facility] [address],  [place], [place]   Phone [phone]   URINE RT REFLEX TO CULTURE - Abnormal; Notable for the following components:    Clarity, UA TURBID (*)     Blood, Urine LARGE (*)     Protein,  (*)     Leukocyte Esterase, Urine LARGE (*)     All other components within normal limits    Narrative:     Performed at:                  [facility] [address],  [place], [place]   Phone [phone]   COMPREHENSIVE METABOLIC PANEL W/ REFLEX TO MG FOR LOW K - Abnormal; Notable for the following components:    Sodium 131 (*)     Potassium reflex Magnesium 3.1 (*)     Chloride 90 (*)     Anion Gap 17 (*)     Glucose 158 (*)     BUN 41 (*)     CREATININE 3.8 (*)     GFR Non- 17 (*)     GFR  20 (*)     Total Protein 8.6 (*)     Albumin/Globulin Ratio 0.7 (*)     All other throughout the bowel which could represent early   ileus. Recommend follow-up. Mild constipation. Mild prostatic enlargement. XR CHEST PORTABLE   Final Result   No acute cardiopulmonary disease. CRITICAL CARE:   Total critical care time of 31 minutes (excludes any time for procedures). This includes but not limited to vital sign monitoring, medication, clinical response to medications, interpretation of diagnostics, review of nursing notes, pertinent record review, discussions about patient condition, consultation time, documentation time. Critical care due to the patient's sepsis. Vitals:    02/10/21 2032 02/10/21 2130 02/10/21 2256 02/10/21 2353   BP: (!) 94/51 117/69 127/70 127/64   Pulse: 92 91 102    Resp: 24 25 28    Temp:   99.4 °F (37.4 °C)    TempSrc:   Oral    SpO2: 92%  94%    Weight:         Medications administered:  Medications   0.9 % sodium chloride bolus (1,000 mLs Intravenous New Bag 2/10/21 2237)   0.9 % sodium chloride bolus (0 mLs Intravenous Stopped 2/10/21 2128)   cefepime (MAXIPIME) 2000 mg IVPB minibag (0 mg Intravenous Stopped 2/10/21 2354)     FINAL IMPRESSION:    1. Acute renal failure, unspecified acute renal failure type ([address]    2. Pyelonephritis    3.  Severe sepsis ([address]      DISPOSITION Admitted 02/10/2021 10:50:00 PM       Consuelo Wilson MD  02/11/21 8855

## 2021-02-11 NOTE — ED PROVIDER NOTES
[address]        Pt Name: Mandy Carey  MRN: [id number]  Briangfquirino 1965  Date of evaluation: 2/10/2021  Provider: Harjit Woodson PA-C  PCP: Lamonte Irvin MD     I have seen and evaluated this patient with my supervising physician Jing Tubbs MD    [address] [place] [address]       Chief Complaint   Patient presents with    Urinary Frequency     Increase in needing to use the bathroom, urine with with an odor. Chills and feverr, fatigue       HISTORY OF PRESENT ILLNESS   (Location, Timing/Onset, Context/Setting, Quality, Duration, Modifying Factors, Severity, Associated Signs and Symptoms)  Note limiting factors. Mandy Carey is a 50 y.o. male patient presents emergency department for evaluation of increased urinary frequency, burning urination and urine with foul odor that started on Sunday. Patient states he developed a fever as high as 103 °F as well on Saturday or Sunday. Patient states on Sunday he also developed a cough with nasal congestion. Patient states he has had some associated chills and fatigue. Patient states he took some over-the-counter cough and cold medicine with acetaminophen prior to arrival here in the emergency department and was afebrile at triage. Patient states in December he noticed some blood in his urine that lasted for a few days but then went away. Patient states that he has had intermittent increase in urinary frequency is particularly at night since that time however. He made an appointment to see his primary care doctor on Friday but was starting to feel so unwell he felt he should come to the emergency department this evening. Patient denies any abdominal pain or back pain, nausea vomiting or diarrhea. Nursing Notes were all reviewed and agreed with or any disagreements were addressed in the HPI.     REVIEW OF SYSTEMS    (2-9 systems for level 4, 10 or more for level 5)     Review of Systems Constitutional: Positive for chills, fatigue and fever. HENT: Positive for congestion. Eyes: Negative for visual disturbance. Respiratory: Positive for cough. Negative for shortness of breath. Cardiovascular: Negative for chest pain. Gastrointestinal: Negative for abdominal pain, nausea and vomiting. Genitourinary: Positive for dysuria and frequency. Musculoskeletal: Negative. Skin: Negative. Neurological: Negative. Positives and Pertinent negatives as per HPI. Except as noted above in the ROS, all other systems were reviewed and negative.        PAST MEDICAL HISTORY     Past Medical History:   Diagnosis Date    Arthritis     right knee    Diabetes mellitus type 2, controlled ([address]     Dysphagia     better after 11/19 egd    Hypertension          SURGICAL HISTORY     Past Surgical History:   Procedure Laterality Date    COLONOSCOPY  07/2017 11/19 fu 5 yrs    KNEE SURGERY      right mcl 30 years ago times 2    UPPER GASTROINTESTINAL ENDOSCOPY      11/19 - , jinny bear         CURRENTMEDICATIONS       Current Discharge Medication List      CONTINUE these medications which have NOT CHANGED    Details   NIFEdipine (PROCARDIA XL) 90 MG extended release tablet TAKE ONE TABLET BY MOUTH DAILY  Qty: 30 tablet, Refills: 4    Associated Diagnoses: Essential hypertension      losartan-hydroCHLOROthiazide (HYZAAR) 100-25 MG per tablet TAKE ONE TABLET BY MOUTH DAILY  Qty: 30 tablet, Refills: 5    Associated Diagnoses: Essential hypertension      pravastatin (PRAVACHOL) 20 MG tablet Take 1 tablet by mouth daily  Qty: 90 tablet, Refills: 1      Multiple Vitamins-Minerals (THERAPEUTIC MULTIVITAMIN-MINERALS) tablet Take 1 tablet by mouth daily      omeprazole (PRILOSEC) 40 MG delayed release capsule TAKE ONE CAPSULE BY MOUTH EVERY MORNING BEFORE BREAKFAST AS NEEDED (REFLUX)  Qty: 30 capsule, Refills: 5    Associated Diagnoses: Dysphagia, unspecified type               ALLERGIES Patient has no known allergies. FAMILYHISTORY       Family History   Problem Relation Age of Onset    Breast Cancer Mother     Heart Failure Father     Prostate Cancer Maternal Grandfather           SOCIAL HISTORY       Social History     Tobacco Use    Smoking status: Never Smoker    Smokeless tobacco: Former User     Types: Chew   Substance Use Topics    Alcohol use: Yes     Frequency: 2-3 times a week     Drinks per session: 3 or 4     Binge frequency: Less than monthly     Comment: three times weekly    Drug use: No       SCREENINGS             PHYSICAL EXAM    (up to 7 for level 4, 8 or more for level 5)     ED Triage Vitals   BP Temp Temp Source Pulse Resp SpO2 Height Weight   02/10/21 1856 02/10/21 1856 02/10/21 1856 02/10/21 1856 02/10/21 1856 02/10/21 1856 -- 02/10/21 1854   (!) 114/51 97.8 °F (36.6 °C) Temporal 119 16 93 %  222 lb 8 oz (100.9 kg)       Physical Exam  Constitutional:       General: He is not in acute distress. Appearance: He is ill-appearing and diaphoretic. HENT:      Head: Normocephalic. Mouth/Throat:      Mouth: Mucous membranes are moist.      Pharynx: Oropharynx is clear. Eyes:      Conjunctiva/sclera: Conjunctivae normal.      Pupils: Pupils are equal, round, and reactive to light. Cardiovascular:      Rate and Rhythm: Tachycardia present. Pulmonary:      Effort: Pulmonary effort is normal.   Abdominal:      General: Bowel sounds are normal.      Tenderness: There is no abdominal tenderness. There is no right CVA tenderness, left CVA tenderness, guarding or rebound. Skin:     General: Skin is warm. Capillary Refill: Capillary refill takes less than 2 seconds. Coloration: Skin is not jaundiced. Findings: No rash. Neurological:      General: No focal deficit present. Mental Status: He is alert.    Psychiatric:         Mood and Affect: Mood normal.         DIAGNOSTIC RESULTS   LABS:    Labs Reviewed   CBC WITH AUTO DIFFERENTIAL - PLASMA    Narrative:     Performed at:  [facility]  [address],  [place], [address]   Phone [phone]   [facility]    Narrative:     Performed at:  [facility]  [address] [facility],  [place], [address]   Phone [phone]   CBC   RENAL FUNCTION PANEL   SODIUM, URINE, RANDOM   CREATININE, RANDOM URINE   TSH WITH REFLEX   OSMOLALITY, URINE   URIC ACID   HEMOGLOBIN A1C   POCT GLUCOSE   POCT GLUCOSE       All other labs were within normal range or not returned as of this dictation. EKG: All EKG's are interpreted by the Emergency Department Physician in the absence of a cardiologist.  Please see their note for interpretation of EKG. RADIOLOGY:   Non-plain film images such as CT, Ultrasound and MRI are read by the radiologist. Plain radiographic images are visualized and preliminarily interpreted by the ED Provider with the below findings:        Interpretation per the Radiologist below, if available at the time of this note:    CT ABDOMEN PELVIS WO CONTRAST Additional Contrast? None   Final Result   Moderate perirenal stranding around both kidneys mild stranding and minimal   fluid in the pararenal space which is more prominent on the left and extends   inferiorly along the psoas muscles with no focal fluid collection or mass. This could represent inflammatory changes from bilateral pyelonephritis or   possible perirenal scarring. No hydronephrosis or urinary obstruction is   seen. Recommend clinical follow-up. Mild subsegmental atelectasis or early infiltrates along the lung bases. Small hiatal hernia. Mild chronic liver changes with fatty replacement throughout      Small air-fluid levels throughout the bowel which could represent early   ileus. Recommend follow-up. Mild constipation. Mild prostatic enlargement. XR CHEST PORTABLE   Final Result   No acute cardiopulmonary disease. No results found. PROCEDURES   Unless otherwise noted below, none     Procedures    CRITICAL CARE TIME   There was a high probability of life-threatening clinical deterioration in the patient's condition requiring my urgent intervention. The total critical care time spent while evaluating and treating this patient was at least 35 minutes. This excludes time spent doing separately billable procedures. This includes time at the bedside, data interpretation, medication management, obtaining critical history from collateral sources if the patient is unable to provide it directly, and physician consultation. Specifics of interventions taken and potentially life-threatening diagnostic considerations are listed in the medical decision making.     CONSULTS:  IP CONSULT TO HOSPITALIST  IP CONSULT TO NEPHROLOGY      EMERGENCY DEPARTMENT COURSE and DIFFERENTIAL DIAGNOSIS/MDM:   Vitals:    Vitals:    02/10/21 2130 02/10/21 2256 02/10/21 2353 02/11/21 0015   BP: 117/69 127/70 127/64 113/67   Pulse: 91 102  110   Resp: 25 28  18   Temp:  99.4 °F (37.4 °C)  103.4 °F (39.7 °C)   TempSrc:  Oral  Oral   SpO2:  94%  92%   Weight:    227 lb 4.8 oz (103.1 kg)   Height:    6' 2.25\" (1.886 m)       Patient was given the following medications:  Medications   0.9 % sodium chloride infusion ( Intravenous New Bag 2/11/21 0024)   sodium chloride flush 0.9 % injection 10 mL (has no administration in time range)   sodium chloride flush 0.9 % injection 10 mL (has no administration in time range)   acetaminophen (TYLENOL) tablet 1,000 mg (1,000 mg Oral Given 2/11/21 0032)   NIFEdipine (PROCARDIA XL) extended release tablet 90 mg (has no administration in time range)   pravastatin (PRAVACHOL) tablet 20 mg (has no administration in time range)   heparin (porcine) injection 5,000 Units (has no administration in time range)   cefepime (MAXIPIME) 1000 mg IVPB minibag (has no administration in time range)   benzonatate (TESSALON) capsule 100 mg (has no administration in time range)   lactobacillus (CULTURELLE) capsule 1 capsule (has no administration in time range)   insulin lispro (1 Unit Dial) 0-6 Units (has no administration in time range)   insulin lispro (1 Unit Dial) 0-3 Units (0 Units Subcutaneous Not Given 2/11/21 0046)   glucose (GLUTOSE) 40 % oral gel 15 g (has no administration in time range)   dextrose 50 % IV solution (has no administration in time range)   glucagon (rDNA) injection 1 mg (has no administration in time range)   dextrose 5 % solution (has no administration in time range)   albuterol sulfate  (90 Base) MCG/ACT inhaler 2 puff (has no administration in time range)     And   ipratropium (ATROVENT HFA) 17 MCG/ACT inhaler 2 puff (has no administration in time range)   albuterol sulfate  (90 Base) MCG/ACT inhaler 2 puff (has no administration in time range)   0.9 % sodium chloride bolus (0 mLs Intravenous Stopped 2/10/21 2128)   cefepime (MAXIPIME) 2000 mg IVPB minibag (0 mg Intravenous Stopped 2/10/21 2354)   0.9 % sodium chloride bolus (0 mLs Intravenous Stopped 2/11/21 0046)   potassium chloride (KLOR-CON M) extended release tablet 40 mEq (40 mEq Oral Given 2/11/21 0043)           Patient presents emergency department for evaluation of painful urination, increased frequency with accompanying chills and fever up to 103 °F at home. Patient took a medication with Tylenol at home prior to arrival and is afebrile. Patient is diaphoretic. Patient's urines sample which she has at bedside is turbid in a orange-yellow color. Patient's lungs sound clear to auscultation bilaterally heart rate is tachycardic without murmurs rubs or gallops. Patient has no abdominal tenderness on examination. No CVA tenderness. Patient is started on saline here in the emergency department as I have high suspicion of sepsis secondary to urinary tract infection. Urinalysis comes back grossly infected and patient is started on cefepime.

## 2021-02-11 NOTE — PROGRESS NOTES
.4 Eyes Skin Assessment     NAME:  Milan Purcell  YOB: 1965  MEDICAL RECORD NUMBER:  [id number]    The patient is being assess for  Admission    I agree that 2 RN's have performed a thorough Head to Toe Skin Assessment on the patient. ALL assessment sites listed below have been assessed. Areas assessed by both nurses:    Head, Face, Ears, Shoulders, Back, Chest, Arms, Elbows, Hands, Sacrum. Buttock, Coccyx, Ischium and Legs. Feet and Heels        Does the Patient have a Wound?  No noted wound(s)       Warren Prevention initiated:  NA   Wound Care Orders initiated:  NA    Pressure Injury (Stage 3,4, Unstageable, DTI, NWPT, and Complex wounds) if present place consult order under [de-identified] NA    New and Established Ostomies if present place consult order under : NA      Nurse 1 eSignature: Electronically signed by Samuel Boswell RN on 2/11/21 at 1:57 AM EST    **SHARE this note so that the co-signing nurse is able to place an eSignature**    Nurse 2 eSignature: Electronically signed by Cristi Cabral RN on 2/11/21 at 3:40 AM EST

## 2021-02-11 NOTE — ED NOTES
Bed: 11  Expected date:   Expected time:   Means of arrival:   Comments:  Geovanni Galvez RN  02/10/21 1930

## 2021-02-11 NOTE — CONSULTS
MD Jose Lance MD Monette Been, MD               Office: [phone]                      Fax: [phone]             [facility]                   NEPHROLOGY INITIAL CONSULT NOTE:     PATIENT NAME: Gretchen Godinez  : 1965  MRN: [id number]  REASON FOR CONSULT: I am asked to see this patient in consultation for my opinion regarding management of Acute Kidney Injury. My recommendations will be communicated by way of shared medical record. PRIMARY CARE PHYSICIAN: Gracy Pozo MD      RECOMMENDATIONS:   -IVF w/  cc/hr (~ 1 cc/kg/hr)   -From home med-holding Hyzaar, Prilosec  -stop nifedipine -Allow slightly higher BP -   -Empirical antibiotics for Pylo., w/ Cefepime    -Follow urine culture  -Covid rule out  -f/u urine lytes,   - check CK w/ large blood, but only 25 RBCs on UA, r/o rhabdo   -Keep fleming insertion need W/ h/o BPH (noted on CT also  -no need for dialysis , but at higher risk for decompensation, needing closer monitoring     D/C plan from renal stand point:  - f/u course - might need 2-3 days   -: will f/u w/ me at [address] in 1 week after d/c. (I will arrange.)      IMPRESSION:       SILVIA (on proteinuric CKD-2): Severe on admission    - non-Oligouric  - BL Scr- 0.9-1.0 (last in 10/2020)  ---> 3.8 on admission  : Etiology of SILVIA -toxic ATN/prerenal, due to pyelonephritis, bilateral, UTI + Hyzaar   - other differentials: unlikely any GN / TI / TMA process   - UA : Large blood, only few RBCs, but multiple white cells, leukocytes, few hyaline casts,   -Suggestive of prerenal/UTI  - Renal imaging: w/ CT scan reviewed:       Associated problems:   - Azotemia: Prerenal, follow with IV fluids,.   Unlikely upper GI bleed or steroid induced  - Electrolytes: K: Hypokalemia-needing repletion, likely due to decreased oral intake, with diuretics use,  : Check magnesium level  - Volume status: Mild hypo---volemic  : Na: Hyponatremia, moderate -due to SILVIA + HCTZ use  : Hypertension history-BP: Lower side,  - Acid-Base: Controlled  - Anemia: Mild      Other problems: Management per primary and other consulting teams. Sepsis, likely UTI,    Diabetes type 2, last A1c controlled, less than 7,   -Diet control only,    Hospital Problems           Last Modified POA    SILVIA (acute kidney injury) ([facility] [address] 2/10/2021 Yes            : other supportive care :   - Check daily renal function panel with electrolytes-phosphorus  - Strict monitoring of I/Os, daily weight  - Renal feeds/diet  - Current medications reviewed. - Nephrotoxic medications have been discontinued. - Dose adjusted and appropriate. - Dose meds for eGFR <15 mL/min/1.73m2 during SILVIA    - Avoid heavy opioids due to renal failure - may use very low dose dilaudid / fentanyl with close monitoring of CNS and respiratory depression. Please refer to the orders. High Complexity. Multiple complex problems. Discussed with patient's treatment team-nursing  Thank you for allowing me to participate in this patient's care. Please do not hesitate to contact me with any questions/concerns. We will follow along with you. Erma Schwab, MD  [facility] [id number] [place] [address] [place]: [phone] or Via [employer]  Fax: [phone]        ========================================================   ========================================================     This patient is COVID-19 rule out, so in a strict isolation, as per current protocol; So in order to preserve PPE supply and to avoid unnecessary exposure to prevent transmission of COVID-19; this patient was NOT examined face to face, as risk of contact outweighs the benefits and likely would not change much of my clinical management.    HPI, review of system and physical exam was limited, as it was mainly obtained from chart review and the primary team. But all relevant records and diagnostic tests were Laterality Date    COLONOSCOPY  07/2017 11/19 fu 5 yrs    KNEE SURGERY      right mcl 30 years ago times 2    UPPER GASTROINTESTINAL ENDOSCOPY      11/19 - TESS, jinny bear     FAMILY HISTORY:   Family History   Problem Relation Age of Onset    Breast Cancer Mother     Heart Failure Father     Prostate Cancer Maternal Grandfather      SOCIAL HISTORY:   Social History     Socioeconomic History    Marital status:      Spouse name: None    Number of children: 2    Years of education: None    Highest education level: None   Occupational History    Occupation: /[employer]   Social Needs    Financial resource strain: None    Food insecurity     Worry: None     Inability: None    Transportation needs     Medical: None     Non-medical: None   Tobacco Use    Smoking status: Never Smoker    Smokeless tobacco: Former User     Types: Chew   Substance and Sexual Activity    Alcohol use: Yes     Frequency: 2-3 times a week     Drinks per session: 3 or 4     Binge frequency: Less than monthly     Comment: three times weekly    Drug use: No    Sexual activity: Yes     Partners: Female   Lifestyle    Physical activity     Days per week: None     Minutes per session: None    Stress: None   Relationships    Social connections     Talks on phone: None     Gets together: None     Attends Islam service: None     Active member of club or organization: None     Attends meetings of clubs or organizations: None     Relationship status: None    Intimate partner violence     Fear of current or ex partner: None     Emotionally abused: None     Physically abused: None     Forced sexual activity: None   Other Topics Concern    None   Social History Narrative    2 sons, 2 grandsons    15 yo son and adult son           MEDICATIONS: reviewed by me. Medications Prior to Admission:  No current facility-administered medications on file prior to encounter.       Current Outpatient Medications on File Prior to Encounter   Medication Sig Dispense Refill    NIFEdipine (PROCARDIA XL) 90 MG extended release tablet TAKE ONE TABLET BY MOUTH DAILY 30 tablet 4    losartan-hydroCHLOROthiazide (HYZAAR) 100-25 MG per tablet TAKE ONE TABLET BY MOUTH DAILY 30 tablet 5    pravastatin (PRAVACHOL) 20 MG tablet Take 1 tablet by mouth daily 90 tablet 1    omeprazole (PRILOSEC) 40 MG delayed release capsule TAKE ONE CAPSULE BY MOUTH EVERY MORNING BEFORE BREAKFAST AS NEEDED (REFLUX) 30 capsule 5    Multiple Vitamins-Minerals (THERAPEUTIC MULTIVITAMIN-MINERALS) tablet Take 1 tablet by mouth daily           Current Facility-Administered Medications:     0.9 % sodium chloride infusion, , Intravenous, Continuous, Yolie Camargo PA-C, Last Rate: 100 mL/hr at 02/11/21 0024, New Bag at 02/11/21 0024    sodium chloride flush 0.9 % injection 10 mL, 10 mL, Intravenous, 2 times per day, Elaine Gomez PA-C    sodium chloride flush 0.9 % injection 10 mL, 10 mL, Intravenous, PRN, Elaine Gomez PA-C    acetaminophen (TYLENOL) tablet 1,000 mg, 1,000 mg, Oral, Q6H PRN, Yolie Camargo PA-C, 1,000 mg at 02/11/21 0032    NIFEdipine (PROCARDIA XL) extended release tablet 90 mg, 90 mg, Oral, Daily, Elaine Gomez PA-C    pravastatin (PRAVACHOL) tablet 20 mg, 20 mg, Oral, Daily, Elaine Gomez PA-C    heparin (porcine) injection 5,000 Units, 5,000 Units, Subcutaneous, 3 times per day, Yolie Camargo PA-C, 5,000 Units at 02/11/21 0524    cefepime (MAXIPIME) 1000 mg IVPB minibag, 1,000 mg, Intravenous, Q24H, Elaine Gomez PA-C    benzonatate (TESSALON) capsule 100 mg, 100 mg, Oral, TID PRN, Yolie Camargo PA-C, 100 mg at 02/11/21 0056    lactobacillus (CULTURELLE) capsule 1 capsule, 1 capsule, Oral, BID WC, Elaine Gomez PA-C    insulin lispro (1 Unit Dial) 0-6 Units, 0-6 Units, Subcutaneous, TID WC, Elaine Gomez PA-C    insulin lispro (1 Unit Dial) 0-3 Units, 0-3 Units, Subcutaneous, Nightly, Elaine Gomez PA-C    glucose (GLUTOSE) 40 % oral gel 15 g, 15 g, Oral, PRN, Will Barnhart PA-C    dextrose 50 % IV solution, 12.5 g, Intravenous, PRN, Elaine Gomez PA-C    glucagon (rDNA) injection 1 mg, 1 mg, Intramuscular, PRN, Elaine Gomez PA-C    dextrose 5 % solution, 100 mL/hr, Intravenous, PRN, Elaine Gomez PA-C    albuterol sulfate  (90 Base) MCG/ACT inhaler 2 puff, 2 puff, Inhalation, 4x daily **AND** ipratropium (ATROVENT HFA) 17 MCG/ACT inhaler 2 puff, 2 puff, Inhalation, 4x daily **AND** MDI Treatment, , , 4x daily, Elaine Gomez PA-C    albuterol sulfate  (90 Base) MCG/ACT inhaler 2 puff, 2 puff, Inhalation, Q6H PRN, Will Barnhart PA-C      Allergies reviewed by me: Patient has no known allergies. REVIEW OF SYSTEMS:  Review of systems not obtained due to patient factors    PHYSICAL EXAM:  Recent vital signs and recent I/Os reviewed by me.      Wt Readings from Last 3 Encounters:   02/11/21 227 lb 4.8 oz (103.1 kg)   12/03/20 234 lb (106.1 kg)   08/31/20 237 lb (107.5 kg)     BP Readings from Last 3 Encounters:   02/11/21 108/63   12/03/20 126/76   08/31/20 (!) 142/90     Patient Vitals for the past 24 hrs:   BP Temp Temp src Pulse Resp SpO2 Height Weight   02/11/21 0520 108/63 98.8 °F (37.1 °C) Oral 94 16 90 % -- --   02/11/21 0015 113/67 103.4 °F (39.7 °C) Oral 110 18 92 % 6' 2.25\" (1.886 m) 227 lb 4.8 oz (103.1 kg)   02/10/21 2353 127/64 -- -- -- -- -- -- --   02/10/21 2256 127/70 99.4 °F (37.4 °C) Oral 102 28 94 % -- --   02/10/21 2130 117/69 -- -- 91 25 -- -- --   02/10/21 2032 (!) 94/51 -- -- 92 24 92 % -- --   02/10/21 2030 (!) 94/51 -- -- 88 20 93 % -- --   02/10/21 1856 (!) 114/51 97.8 °F (36.6 °C) Temporal 119 16 93 % -- --   02/10/21 1854 -- -- -- -- -- -- -- 222 lb 8 oz (100.9 kg)       Intake/Output Summary (Last 24 hours) at 2/11/2021 0830  Last data filed at 2/11/2021 0556  Gross per 24 hour   Intake 360 ml Output 1575 ml   Net -1215 ml         Physical exam:    In-person bedside physical examination deferred. Pursuant to the emergency declaration under the [address] authority and the [employer] Act, this clinical encounter was conducted to provide necessary medical care. (Also consistent with new provisions and guidance offered by [facility] on March 18, 2020 in setting of COVID 19 outbreak and in order to preserve personal protective equipment in accordance with the flexibilities announced by CMS on March 30, 2020)   References: https://[facility]. [facility]/Portals/0/Resources/COVID-19/3_18%20Telemed%20Guidance%20Updated%20March%2018. pdf?[email or web address]                      https://[facility]. [facility]/Portals/0/Resources/COVID-19/3_18%20Telemed%20Guidance%20Updated%20March%2018. pdf?deq=[email or web address]                      [email or web address] pdf    However, I did visually inspect the patient and observed the following:      Vitals signs reviewed. Constitutional:       General: not in acute distress. Appearance: ill-appearing. HENT:      Head: Normocephalic and atraumatic. Nose: Nose normal.      Mouth/Throat:      Mouth: Mucous membranes are moist.   Eyes:      General: No scleral icterus. Conjunctiva/sclera: Conjunctivae normal.   Neck:      Musculoskeletal: Normal range of motion and neck supple. Cardiovascular:      Rate and Rhythm: Normal rate. Pulmonary:      Effort: Pulmonary effort is normal.   Abdominal:      General: There is no distension. Musculoskeletal:      Right lower leg:  edema. Left lower leg:  edema. Skin:     Coloration: Skin is not jaundiced. Neurological:      General: No focal deficit present.          DATA:  Diagnostic tests reviewed by me for today's visit:    Recent Labs 02/10/21  1923 02/11/21  0416   WBC 24.8* 20.4*   HCT 38.5* 35.6*   * 410     Iron Saturation:  No components found for: PERCENTFE  FERRITIN:  No results found for: FERRITIN  IRON:  No results found for: IRON  TIBC:  No results found for: TIBC    Recent Labs     02/10/21  1923 02/11/21  0416   * 131*   K 3.1* 3.0*   CL 90* 97*   CO2 24 24   BUN 41* 44*   CREATININE 3.8* 2.8*     Recent Labs     02/10/21  1923 02/11/21  0415 02/11/21  0416   CALCIUM 9.0  --  8.4   MG 2.10 2.30  --    PHOS  --   --  3.3     No results for input(s): PH, PCO2, PO2 in the last 72 hours.     Invalid input(s): Manolo Guile    ABG:  No results found for: PH, PCO2, PO2, HCO3, BE, THGB, TCO2, O2SAT  VBG:  No results found for: PHVEN, FAV2XIL, BEVEN, C4RDKSEA    LDH:  No results found for: LDH  Uric Acid:    Lab Results   Component Value Date    LABURIC 9.9 02/10/2021       PT/INR:  No results found for: PROTIME, INR  Warfarin PT/INR:  No components found for: PTPATWAR, PTINRWAR  PTT:  No results found for: APTT, PTT[APTT}  Last 3 Troponin:  No results found for: TROPONINI    U/A:    Lab Results   Component Value Date    COLORU DK YELLOW 02/10/2021    PROTEINU 100 02/10/2021    PHUR 6.0 02/10/2021    WBCUA >900 02/10/2021    RBCUA 25 02/10/2021    BACTERIA 4+ 02/10/2021    CLARITYU TURBID 02/10/2021    SPECGRAV 1.014 02/10/2021    LEUKOCYTESUR LARGE 02/10/2021    UROBILINOGEN 1.0 02/10/2021    BILIRUBINUR Negative 02/10/2021    BLOODU LARGE 02/10/2021    GLUCOSEU Negative 02/10/2021     Microalbumen/Creatinine ratio:  No components found for: RUCREAT  24 Hour Urine for Protein:  No components found for: RAWUPRO, UHRS3, ZOGW76IX, UTV3  24 Hour Urine for Creatinine Clearance:  No components found for: CREAT4, UHRS10, UTV10  Urine Toxicology:  No components found for: Bernida Binning, IBENZO, ICOCAINE, IMARTHC, IOPIATES, IPHENCYC    HgBA1c:    Lab Results   Component Value Date    LABA1C 6.7 10/30/2020     RPR:  No results found for: RPR  HIV:  No results found for: HIV  FLORIN:    Lab Results   Component Value Date    FLORIN Negative 10/23/2019     RF:  No results found for: RF  DSDNA:  No components found for: DNA  AMYLASE:  No results found for: AMYLASE  LIPASE:  No results found for: LIPASE  Fibrinogen Level:  No components found for: FIB       BELOW MENTIONED RADIOLOGY STUDY RESULTS BY ME:    Ct Abdomen Pelvis Wo Contrast Additional Contrast? None    Result Date: 2/10/2021  EXAMINATION: CT OF THE ABDOMEN AND PELVIS WITHOUT CONTRAST 2/10/2021 7:37 pm TECHNIQUE: CT of the abdomen and pelvis was performed without the administration of intravenous contrast. Multiplanar reformatted images are provided for review. Dose modulation, iterative reconstruction, and/or weight based adjustment of the mA/kV was utilized to reduce the radiation dose to as low as reasonably achievable. COMPARISON: None. HISTORY: ORDERING SYSTEM PROVIDED HISTORY: increased urinary frequency, fever TECHNOLOGIST PROVIDED HISTORY: Reason for exam:->increased urinary frequency, fever Additional Contrast?->None Decision Support Exception->Emergency Medical Condition (MA) Reason for Exam: increased urinary frequency, fever. Urinary Frequency (Increase in needing to use the bathroom, urine with with an odor. Chills and feverr, fatigue). Acuity: Acute Type of Exam: Initial FINDINGS: Lower Chest: There hazy subsegmental linear opacities along the lung bases which is more prominent on the right. Organs: The liver is mildly enlarged with fatty replacement throughout. The gallbladder, pancreas, and bile ducts are normal.  The spleen is unremarkable. The kidneys are grossly normal size there is mild to moderate perirenal stranding bilaterally which is more prominent on the left. There is mild stranding and minimal fluid in the perirenal space bilaterally which is more prominent on the left extends inferiorly along the psoas muscles with no focal fluid collection or mass. No hydronephrosis, renal stone or mass is seen. GI/Bowel: There is mild feces scattered in the colon. The appendix is normal with no bowel obstruction. The mesentery is unremarkable. There are several small air-fluid levels throughout the bowel which is not significantly dilated. Pelvis: The bladder is not well distended. The prostate gland is mildly enlarged. No pelvic mass is seen. There is no adenopathy or ascites seen. Peritoneum/Retroperitoneum:   The ureters are normal caliber. The aorta is unremarkable. There is no periaortic mass or fluid collection and no retroperitoneal adenopathy is seen. Bones/Soft Tissues: The bones are intact. No aggressive osseous lesion is seen. Moderate perirenal stranding around both kidneys mild stranding and minimal fluid in the pararenal space which is more prominent on the left and extends inferiorly along the psoas muscles with no focal fluid collection or mass. This could represent inflammatory changes from bilateral pyelonephritis or possible perirenal scarring. No hydronephrosis or urinary obstruction is seen. Recommend clinical follow-up. Mild subsegmental atelectasis or early infiltrates along the lung bases. Small hiatal hernia. Mild chronic liver changes with fatty replacement throughout Small air-fluid levels throughout the bowel which could represent early ileus. Recommend follow-up. Mild constipation. Mild prostatic enlargement. Xr Chest Portable    Result Date: 2/10/2021  EXAMINATION: ONE XRAY VIEW OF THE CHEST 2/10/2021 7:19 pm COMPARISON: None. HISTORY: ORDERING SYSTEM PROVIDED HISTORY: cough, fever TECHNOLOGIST PROVIDED HISTORY: Reason for exam:->cough, fever Reason for Exam: Increase in needing to use the bathroom, urine with with an odor. Chills and feverr, fatigue Acuity: Unknown Type of Exam: Initial FINDINGS: The patient is rotated.  The cardiac silhouette, mediastinal and hilar contours are normal.  Right hemidiaphragm is slightly

## 2021-02-11 NOTE — CARE COORDINATION
CM reviewed chart for d/c planning. Pt is alert and oriented and independent from home. Has fleming at this time, nephrology following. Per note today: no need for dialysis. Pt currently on 3 liters of oxygen. Covid test still pending. CM will follow for d/c needs.      Jose Manuel Mcmullen RN, BSN  [phone]

## 2021-02-12 LAB
ALBUMIN SERPL-MCNC: 3.2 G/DL (ref 3.4–5)
ANION GAP SERPL CALCULATED.3IONS-SCNC: 10 MMOL/L (ref 3–16)
BUN BLDV-MCNC: 28 MG/DL (ref 7–20)
CALCIUM SERPL-MCNC: 8.3 MG/DL (ref 8.3–10.6)
CHLORIDE BLD-SCNC: 100 MMOL/L (ref 99–110)
CO2: 26 MMOL/L (ref 21–32)
CREAT SERPL-MCNC: 1.5 MG/DL (ref 0.9–1.3)
GFR AFRICAN AMERICAN: 59
GFR NON-AFRICAN AMERICAN: 49
GLUCOSE BLD-MCNC: 108 MG/DL (ref 70–99)
GLUCOSE BLD-MCNC: 112 MG/DL (ref 70–99)
GLUCOSE BLD-MCNC: 119 MG/DL (ref 70–99)
GLUCOSE BLD-MCNC: 130 MG/DL (ref 70–99)
GLUCOSE BLD-MCNC: 137 MG/DL (ref 70–99)
HCT VFR BLD CALC: 36.9 % (ref 40.5–52.5)
HEMOGLOBIN: 11.9 G/DL (ref 13.5–17.5)
MAGNESIUM: 2.7 MG/DL (ref 1.8–2.4)
MCH RBC QN AUTO: 28 PG (ref 26–34)
MCHC RBC AUTO-ENTMCNC: 32.3 G/DL (ref 31–36)
MCV RBC AUTO: 86.6 FL (ref 80–100)
ORGANISM: ABNORMAL
PDW BLD-RTO: 14.1 % (ref 12.4–15.4)
PERFORMED ON: ABNORMAL
PHOSPHORUS: 2 MG/DL (ref 2.5–4.9)
PLATELET # BLD: 404 K/UL (ref 135–450)
PMV BLD AUTO: 8.1 FL (ref 5–10.5)
POTASSIUM SERPL-SCNC: 2.9 MMOL/L (ref 3.5–5.1)
POTASSIUM SERPL-SCNC: 3.2 MMOL/L (ref 3.5–5.1)
RBC # BLD: 4.26 M/UL (ref 4.2–5.9)
SODIUM BLD-SCNC: 136 MMOL/L (ref 136–145)
URINE CULTURE, ROUTINE: ABNORMAL
WBC # BLD: 15.1 K/UL (ref 4–11)

## 2021-02-12 PROCEDURE — 85027 COMPLETE CBC AUTOMATED: CPT

## 2021-02-12 PROCEDURE — 84132 ASSAY OF SERUM POTASSIUM: CPT

## 2021-02-12 PROCEDURE — 94761 N-INVAS EAR/PLS OXIMETRY MLT: CPT

## 2021-02-12 PROCEDURE — 2700000000 HC OXYGEN THERAPY PER DAY

## 2021-02-12 PROCEDURE — 6360000002 HC RX W HCPCS: Performed by: PHYSICIAN ASSISTANT

## 2021-02-12 PROCEDURE — 6360000002 HC RX W HCPCS: Performed by: INTERNAL MEDICINE

## 2021-02-12 PROCEDURE — 6370000000 HC RX 637 (ALT 250 FOR IP): Performed by: PHYSICIAN ASSISTANT

## 2021-02-12 PROCEDURE — 6370000000 HC RX 637 (ALT 250 FOR IP): Performed by: INTERNAL MEDICINE

## 2021-02-12 PROCEDURE — 2580000003 HC RX 258: Performed by: INTERNAL MEDICINE

## 2021-02-12 PROCEDURE — 84154 ASSAY OF PSA FREE: CPT

## 2021-02-12 PROCEDURE — 84153 ASSAY OF PSA TOTAL: CPT

## 2021-02-12 PROCEDURE — 94150 VITAL CAPACITY TEST: CPT

## 2021-02-12 PROCEDURE — 2580000003 HC RX 258: Performed by: PHYSICIAN ASSISTANT

## 2021-02-12 PROCEDURE — 80069 RENAL FUNCTION PANEL: CPT

## 2021-02-12 PROCEDURE — 87040 BLOOD CULTURE FOR BACTERIA: CPT

## 2021-02-12 PROCEDURE — 2060000000 HC ICU INTERMEDIATE R&B

## 2021-02-12 PROCEDURE — 6360000002 HC RX W HCPCS: Performed by: HOSPITALIST

## 2021-02-12 PROCEDURE — 36415 COLL VENOUS BLD VENIPUNCTURE: CPT

## 2021-02-12 PROCEDURE — 94640 AIRWAY INHALATION TREATMENT: CPT

## 2021-02-12 PROCEDURE — 83735 ASSAY OF MAGNESIUM: CPT

## 2021-02-12 RX ORDER — IPRATROPIUM BROMIDE AND ALBUTEROL SULFATE 2.5; .5 MG/3ML; MG/3ML
1 SOLUTION RESPIRATORY (INHALATION)
Status: DISCONTINUED | OUTPATIENT
Start: 2021-02-12 | End: 2021-02-14 | Stop reason: HOSPADM

## 2021-02-12 RX ORDER — POTASSIUM CHLORIDE 7.45 MG/ML
10 INJECTION INTRAVENOUS PRN
Status: DISCONTINUED | OUTPATIENT
Start: 2021-02-12 | End: 2021-02-14 | Stop reason: HOSPADM

## 2021-02-12 RX ORDER — POTASSIUM CHLORIDE 20 MEQ/1
40 TABLET, EXTENDED RELEASE ORAL PRN
Status: DISCONTINUED | OUTPATIENT
Start: 2021-02-12 | End: 2021-02-14 | Stop reason: HOSPADM

## 2021-02-12 RX ORDER — POTASSIUM CHLORIDE 7.45 MG/ML
10 INJECTION INTRAVENOUS
Status: DISPENSED | OUTPATIENT
Start: 2021-02-12 | End: 2021-02-12

## 2021-02-12 RX ADMIN — POTASSIUM CHLORIDE 10 MEQ: 7.46 INJECTION, SOLUTION INTRAVENOUS at 20:48

## 2021-02-12 RX ADMIN — Medication 1 CAPSULE: at 17:05

## 2021-02-12 RX ADMIN — HEPARIN SODIUM 5000 UNITS: 5000 INJECTION INTRAVENOUS; SUBCUTANEOUS at 22:56

## 2021-02-12 RX ADMIN — Medication 2 PUFF: at 08:52

## 2021-02-12 RX ADMIN — POTASSIUM CHLORIDE 10 MEQ: 7.46 INJECTION, SOLUTION INTRAVENOUS at 07:59

## 2021-02-12 RX ADMIN — CEFEPIME HYDROCHLORIDE 2000 MG: 2 INJECTION, POWDER, FOR SOLUTION INTRAVENOUS at 22:57

## 2021-02-12 RX ADMIN — IPRATROPIUM BROMIDE AND ALBUTEROL SULFATE 1 AMPULE: .5; 3 SOLUTION RESPIRATORY (INHALATION) at 20:19

## 2021-02-12 RX ADMIN — POTASSIUM CHLORIDE 10 MEQ: 7.46 INJECTION, SOLUTION INTRAVENOUS at 14:38

## 2021-02-12 RX ADMIN — SODIUM CHLORIDE: 9 INJECTION, SOLUTION INTRAVENOUS at 20:48

## 2021-02-12 RX ADMIN — POTASSIUM CHLORIDE 10 MEQ: 7.46 INJECTION, SOLUTION INTRAVENOUS at 16:28

## 2021-02-12 RX ADMIN — Medication 2 PUFF: at 08:51

## 2021-02-12 RX ADMIN — HEPARIN SODIUM 5000 UNITS: 5000 INJECTION INTRAVENOUS; SUBCUTANEOUS at 05:28

## 2021-02-12 RX ADMIN — Medication 1 CAPSULE: at 07:59

## 2021-02-12 RX ADMIN — SODIUM CHLORIDE: 9 INJECTION, SOLUTION INTRAVENOUS at 07:59

## 2021-02-12 RX ADMIN — CEFEPIME HYDROCHLORIDE 2000 MG: 2 INJECTION, POWDER, FOR SOLUTION INTRAVENOUS at 11:11

## 2021-02-12 RX ADMIN — HEPARIN SODIUM 5000 UNITS: 5000 INJECTION INTRAVENOUS; SUBCUTANEOUS at 14:48

## 2021-02-12 RX ADMIN — PRAVASTATIN SODIUM 20 MG: 20 TABLET ORAL at 07:59

## 2021-02-12 ASSESSMENT — PAIN SCALES - GENERAL
PAINLEVEL_OUTOF10: 0

## 2021-02-12 NOTE — PROGRESS NOTES
Hospitalist Progress Note      PCP: Dennis Gilford, MD    Date of Admission: 2/10/2021    Chief Complaint: Fever and dysuria    Hospital Course: Admitted with fever and dysuria. Found to have bilateral pyelonephritis and bacteremia. Started on cefepime. Has acute renal failure upon admission. Nephrology consulted. Nephrotoxins held. Started on IV fluids. Creatinine slowly improving. Patient also has some cough and congestion for last few days. COVID-19 test came back negative. Chest x-ray clear. Richey removed today. Has hypokalemia and potassium being replaced as well. Subjective:    Doing better overall. Not much dysuria. Some pain in the bilateral flank areas. T-max 101.2 last night at 2215. No shortness of breath. Has some cough. On 2 L oxygen currently.     Medications:  Reviewed    Infusion Medications    sodium chloride 100 mL/hr at 02/12/21 0759    dextrose       Scheduled Medications    cefepime  2,000 mg Intravenous Q12H    ipratropium-albuterol  1 ampule Inhalation Q4H WA    sodium chloride flush  10 mL Intravenous 2 times per day    pravastatin  20 mg Oral Daily    heparin (porcine)  5,000 Units Subcutaneous 3 times per day    lactobacillus  1 capsule Oral BID WC    insulin lispro  0-6 Units Subcutaneous TID WC    insulin lispro  0-3 Units Subcutaneous Nightly     PRN Meds: potassium chloride **OR** potassium alternative oral replacement **OR** potassium chloride, sodium chloride flush, acetaminophen, benzonatate, glucose, dextrose, glucagon (rDNA), dextrose, albuterol sulfate HFA      Intake/Output Summary (Last 24 hours) at 2/12/2021 1856  Last data filed at 2/12/2021 1124  Gross per 24 hour   Intake 1939 ml   Output 3430 ml   Net -1491 ml       Physical Exam Performed:    /76   Pulse 94   Temp 98.6 °F (37 °C) (Oral)   Resp 18   Ht 6' 2.25\" (1.886 m)   Wt 227 lb 9.6 oz (103.2 kg)   SpO2 95%   BMI 29.03 kg/m²     General appearance: No apparent distress, appears stated age and cooperative. HEENT: Pupils equal, round, and reactive to light. Conjunctivae/corneas clear. Neck: Supple, with full range of motion. No jugular venous distention. Trachea midline. Respiratory:  Normal respiratory effort. Clear to auscultation, bilaterally without Rales/Wheezes/Rhonchi. Cardiovascular: Regular rate and rhythm with normal S1/S2 without murmurs, rubs or gallops. Abdomen: Soft, mildly tender bilaterally in the flanks, non-distended with normal bowel sounds. Musculoskeletal: No clubbing, cyanosis or edema bilaterally. Full range of motion without deformity. Skin: Skin color, texture, turgor normal.  No rashes or lesions. Neurologic:  Neurovascularly intact without any focal sensory/motor deficits. Cranial nerves: II-XII intact, grossly non-focal.  Psychiatric: Alert and oriented, thought content appropriate, normal insight  Capillary Refill: Brisk,< 3 seconds   Peripheral Pulses: +2 palpable, equal bilaterally       Labs:   Recent Labs     02/10/21  1923 02/11/21  0416 02/12/21  0546   WBC 24.8* 20.4* 15.1*   HGB 12.6* 11.6* 11.9*   HCT 38.5* 35.6* 36.9*   * 410 404     Recent Labs     02/10/21  1923 02/11/21  0416 02/12/21  0546 02/12/21  1747   * 131* 136  --    K 3.1* 3.0* 2.9* 3.2*   CL 90* 97* 100  --    CO2 24 24 26  --    BUN 41* 44* 28*  --    CREATININE 3.8* 2.8* 1.5*  --    CALCIUM 9.0 8.4 8.3  --    PHOS  --  3.3 2.0*  --      Recent Labs     02/10/21  1923   AST 22   ALT 24   BILITOT 0.6   ALKPHOS 62     No results for input(s): INR in the last 72 hours.   Recent Labs     02/11/21 0415   CKTOTAL 154       Urinalysis:      Lab Results   Component Value Date    NITRU Negative 02/10/2021    WBCUA >900 02/10/2021    BACTERIA 4+ 02/10/2021    RBCUA 25 02/10/2021    BLOODU LARGE 02/10/2021    SPECGRAV 1.014 02/10/2021    GLUCOSEU Negative 02/10/2021       Radiology:  CT ABDOMEN PELVIS WO CONTRAST Additional Contrast? None   Final Result Moderate perirenal stranding around both kidneys mild stranding and minimal   fluid in the pararenal space which is more prominent on the left and extends   inferiorly along the psoas muscles with no focal fluid collection or mass. This could represent inflammatory changes from bilateral pyelonephritis or   possible perirenal scarring. No hydronephrosis or urinary obstruction is   seen. Recommend clinical follow-up. Mild subsegmental atelectasis or early infiltrates along the lung bases. Small hiatal hernia. Mild chronic liver changes with fatty replacement throughout      Small air-fluid levels throughout the bowel which could represent early   ileus. Recommend follow-up. Mild constipation. Mild prostatic enlargement. XR CHEST PORTABLE   Final Result   No acute cardiopulmonary disease. Assessment/Plan:    Active Hospital Problems    Diagnosis    SILVIA (acute kidney injury) ([facility] [address] [N17.9]       Acute kidney injury: Secondary to pyelonephritis and Hyzaar. Holding nephrotoxic's. On IV fluids per nephrology. Creatinine slowly trending down    Continue IV fluid hydration. Discontinue Richey catheter. Continue strict I's and O's. Bilateral pyelonephritis: Urine cultures grew E. coli. Bacteremia noted as well. On cefepime. Temperature spikes improving. Continue antibiotics. Repeat blood cultures     Sepsis present on admission: Presented with fever, tachycardia, leukocytosis, tachypnea. Secondary UTI. Improving overall. Cough/hypoxia: Suspect secondary atelectasis. Chest x-ray clear. COVID-19 negative. Increased mobility. Incentive spirometry. Hypertension: Procardia. Stopped Hyzaar. Diabetes mellitus type 2: A1c 6.7. Sliding scale insulin here. Hyperlipidemia: Statin    Prostatic enlargement: Noted on CT. PSA pending. DVT Prophylaxis: Heparin  Diet: DIET CARB CONTROL;  Renal  Code Status: Full Code    PT/OT Eval Status: Not needed    Dispo - inpatient 1 to 2 days    Sena Jarvis MD

## 2021-02-12 NOTE — PROGRESS NOTES
Richey catheter removed; pt tolerated well. 630ml output. IVPB started for cefepime.  Justus Kumar, Student RN

## 2021-02-12 NOTE — PROGRESS NOTES
Potassium down to 2.9 this morning. Messaged hospitalist. Message read :Just want to let you know Pt's Potassium is down to 2.9 this morning. It is down from 3.0 yesterday. Thank you.

## 2021-02-12 NOTE — PROGRESS NOTES
MD Hermes Morocho MD Brandy Formosa, MD               Office: [phone]                      Fax: [phone] [facility]                   NEPHROLOGY INPATIENT PROGRESS NOTE:     PATIENT NAME: Hanny Craig  : 1965  MRN: [id number]      RECOMMENDATIONS:   -keep IVF w/  cc/hr (~ 1 cc/kg/hr)   -From home med-holding Hyzaar, Prilosec  -stoped nifedipine -Allow slightly higher BP -     -Empirical antibiotics for Pylo., w/ Cefepime    -Follow urine culture    + K is being repleted per PRN protocol  + Mag - monitoring (2.7 now) - not needing repletion     -Keep fleming insertion need W/ h/o BPH (noted on CT also   **Voiding trial in 24 hours     D/C plan from renal stand point:  - f/u course - might need 2-3 days   -: will f/u w/ me at [address] in 1 week after d/c. (I will arrange.)    I have called and discussed with patient's wife (as per patient's request)       IMPRESSION:       SILVIA (on proteinuric CKD-2): Severe on admission-improving  - non-Oligouric  - BL Scr- 0.9-1.0 (last in 10/2020)  ---> 3.8 on admission  : Etiology of SILVIA -toxic ATN/prerenal, due to pyelonephritis, bilateral, UTI + Hyzaar   - other differentials: unlikely any GN / TI / TMA process   - UA : Large blood, only few RBCs, but multiple white cells, leukocytes, few hyaline casts,   -Suggestive of prerenal/UTI more likely. - Renal imaging: w/ CT scan reviewed:   -urine lytes = might be suggestive of prerenal plus ATN  -Covid negative     - checked CK w/ large blood, but only 25 RBCs on UA, r/o rhabdo - as CK WNL       Associated problems:   - Azotemia: Prerenal, follow with IV fluids,.   Unlikely upper GI bleed or steroid induced  - Electrolytes: K: Hypokalemia-needing repletion, likely due to decreased oral intake, with diuretics use,  : Check magnesium level  - Volume status: Mild hypo---volemic  : Na: Hyponatremia, moderate -due to SILVIA + HCTZ use  : Hypertension history-BP: Lower side,  - Acid-Base: Controlled  - Anemia: Mild      Other problems: Management per primary and other consulting teams. Sepsis, likely UTI,    Diabetes type 2, last A1c controlled, less than 7,   -Diet control only,    Hospital Problems           Last Modified POA    SILVIA (acute kidney [facility]) ([facility] 2/10/2021 Yes            : other supportive care :   - Check daily renal function panel with electrolytes-phosphorus  - Strict monitoring of I/Os, daily weight  - Renal feeds/diet  - Current medications reviewed. - Nephrotoxic medications have been discontinued. - Dose adjusted and appropriate. - Dose meds for eGFR <15 mL/min/1.73m2 during SILVIA    - Avoid heavy opioids due to renal failure - may use very low dose dilaudid / fentanyl with close monitoring of CNS and respiratory depression. Please refer to the orders. High Complexity. Multiple complex problems. Discussed with patient's treatment team-nursing  Thank you for allowing me to participate in this patient's care. Please do not hesitate to contact me with any questions/concerns. We will follow along with you. Ashley Erickson MD  [facility] [address] [place] [address] [place]: [phone] or Via [employer]  Fax: [phone]        ========================================================   ========================================================   Subjective: Face to face   Patient was seen comfortably sitting up in the bed,   Reported no active complaints,   Renal function stable. Past medical, Surgical, Social, Family medical history reviewed by me. MEDICATIONS: reviewed by me. Medications Prior to Admission:  No current facility-administered medications on file prior to encounter.       Current Outpatient Medications on File Prior to Encounter   Medication Sig Dispense Refill    NIFEdipine (PROCARDIA XL) 90 MG extended release tablet TAKE ONE TABLET BY MOUTH DAILY 30 tablet 4    losartan-hydroCHLOROthiazide (HYZAAR) 100-25 MG per tablet TAKE ONE TABLET BY MOUTH DAILY 30 tablet 5    pravastatin (PRAVACHOL) 20 MG tablet Take 1 tablet by mouth daily 90 tablet 1    omeprazole (PRILOSEC) 40 MG delayed release capsule TAKE ONE CAPSULE BY MOUTH EVERY MORNING BEFORE BREAKFAST AS NEEDED (REFLUX) 30 capsule 5    Multiple Vitamins-Minerals (THERAPEUTIC MULTIVITAMIN-MINERALS) tablet Take 1 tablet by mouth daily           Current Facility-Administered Medications:     potassium chloride (KLOR-CON M) extended release tablet 40 mEq, 40 mEq, Oral, PRN **OR** potassium bicarb-citric acid (EFFER-K) effervescent tablet 40 mEq, 40 mEq, Oral, PRN **OR** potassium chloride 10 mEq/100 mL IVPB (Peripheral Line), 10 mEq, Intravenous, PRN, Lisbeth Cabral MD, Last Rate: 100 mL/hr at 02/12/21 0759, 10 mEq at 02/12/21 0759    cefepime (MAXIPIME) 2000 mg IVPB minibag, 2,000 mg, Intravenous, Q12H, Talia Carr MD    0.9 % sodium chloride infusion, , Intravenous, Continuous, Tootie Jung PA-C, Last Rate: 100 mL/hr at 02/12/21 0759, New Bag at 02/12/21 0759    sodium chloride flush 0.9 % injection 10 mL, 10 mL, Intravenous, 2 times per day, Tootie Jung PA-C, 10 mL at 02/11/21 2217    sodium chloride flush 0.9 % injection 10 mL, 10 mL, Intravenous, PRN, Elaine Gomez PA-C    acetaminophen (TYLENOL) tablet 1,000 mg, 1,000 mg, Oral, Q6H PRN, Tootie Jung PA-C, 1,000 mg at 02/11/21 2221    pravastatin (PRAVACHOL) tablet 20 mg, 20 mg, Oral, Daily, Elaine Gomez PA-C, 20 mg at 02/12/21 0759    heparin (porcine) injection 5,000 Units, 5,000 Units, Subcutaneous, 3 times per day, Tootie Jung PA-C, 5,000 Units at 02/12/21 0528    benzonatate (TESSALON) capsule 100 mg, 100 mg, Oral, TID PRN, Tootie Jung PA-C, 100 mg at 02/11/21 2221    lactobacillus (CULTURELLE) capsule 1 capsule, 1 capsule, Oral, BID WC, Elaine Gomez PA-C, 1 capsule at 02/12/21 4482    insulin lispro (1 Unit Dial) 0-6 Units, 0-6 Units, Subcutaneous, TID WC, Elaine Gomez PA-C    insulin lispro (1 Unit Dial) 0-3 Units, 0-3 Units, Subcutaneous, Nightly, Elaine Gomez PA-C    glucose (GLUTOSE) 40 % oral gel 15 g, 15 g, Oral, PRN, Elaine Gomez PA-C    dextrose 50 % IV solution, 12.5 g, Intravenous, PRN, Elaine Gomez PA-C    glucagon (rDNA) injection 1 mg, 1 mg, Intramuscular, PRN, Elaine Gomez PA-C    dextrose 5 % solution, 100 mL/hr, Intravenous, PRN, Elaine Gomez PA-C    albuterol sulfate  (90 Base) MCG/ACT inhaler 2 puff, 2 puff, Inhalation, 4x daily, 2 puff at 02/12/21 0852 **AND** ipratropium (ATROVENT HFA) 17 MCG/ACT inhaler 2 puff, 2 puff, Inhalation, 4x daily, 2 puff at 02/12/21 0851 **AND** MDI Treatment, , , 4x daily, Elaine Gomez PA-C    albuterol sulfate  (90 Base) MCG/ACT inhaler 2 puff, 2 puff, Inhalation, Q6H PRN, Anupama Berger PA-C      Allergies reviewed by me: Patient has no known allergies. PHYSICAL EXAM:  Recent vital signs and recent I/Os reviewed by me.      Wt Readings from Last 3 Encounters:   02/12/21 227 lb 9.6 oz (103.2 kg)   12/03/20 234 lb (106.1 kg)   08/31/20 237 lb (107.5 kg)     BP Readings from Last 3 Encounters:   02/12/21 121/67   12/03/20 126/76   08/31/20 (!) 142/90     Patient Vitals for the past 24 hrs:   BP Temp Temp src Pulse Resp SpO2 Weight   02/12/21 0852 -- -- -- -- 16 92 % --   02/12/21 0745 121/67 99.2 °F (37.3 °C) Oral 90 16 93 % --   02/12/21 0520 (!) 96/58 99.5 °F (37.5 °C) Oral 92 14 91 % 227 lb 9.6 oz (103.2 kg)   02/12/21 0028 98/62 98.2 °F (36.8 °C) Oral 80 16 90 % --   02/11/21 2230 -- -- -- -- -- 91 % --   02/11/21 2221 -- -- -- -- -- (!) 88 % --   02/11/21 2215 121/68 101.2 °F (38.4 °C) Oral 98 18 94 % --   02/11/21 2049 -- -- -- -- 18 91 % --   02/11/21 2047 -- -- -- -- 18 90 % --   02/11/21 1647 -- -- -- -- -- 90 % --   02/11/21 1533 114/65 98.7 °F (37.1 °C) Oral 94 16 91 % -- 02/11/21 1217 -- -- -- -- -- 92 % --   02/11/21 1216 -- -- -- -- -- (!) 87 % --   02/11/21 1130 100/61 99.8 °F (37.7 °C) Oral 99 16 91 % --       Intake/Output Summary (Last 24 hours) at 2/12/2021 4219  Last data filed at 2/12/2021 0745  Gross per 24 hour   Intake 1939 ml   Output 4250 ml   Net -2311 ml         Physical exam:    General: Awake, Alert,    HENT: Atraumatic, normocephalic   Eyes: Normal conjunctiva, Non-incteral sclera. Neck: Supple, JVD not visible. CVS:  Heart sounds are normal. No loud murmur. RS: Normal respiratory effort, Breat sound: diminished at bases. Abd: Soft , bowel sounds are normal, no distension and no tenderness  . Skin: No rash , some bruises,   CNS: Awake Oriented  , No focal.   Extremities/MSK: trace Edema, no cyanosis. DATA:  Diagnostic tests reviewed by me for today's visit:    Recent Labs     02/10/21  1923 02/11/21  0416 02/12/21  0546   WBC 24.8* 20.4* 15.1*   HCT 38.5* 35.6* 36.9*   * 410 404     Iron Saturation:  No components found for: PERCENTFE  FERRITIN:  No results found for: FERRITIN  IRON:  No results found for: IRON  TIBC:  No results found for: TIBC    Recent Labs     02/10/21  1923 02/11/21  0416 02/12/21  0546   * 131* 136   K 3.1* 3.0* 2.9*   CL 90* 97* 100   CO2 24 24 26   BUN 41* 44* 28*   CREATININE 3.8* 2.8* 1.5*     Recent Labs     02/10/21  1923 02/11/21  0415 02/11/21  0416 02/12/21  0546   CALCIUM 9.0  --  8.4 8.3   MG 2.10 2.30  2.30  --  2.70*   PHOS  --   --  3.3 2.0*     No results for input(s): PH, PCO2, PO2 in the last 72 hours.     Invalid input(s): Federico Humphreys    ABG:  No results found for: PH, PCO2, PO2, HCO3, BE, THGB, TCO2, O2SAT  VBG:  No results found for: PHVEN, GAX7HQN, BEVEN, [id number]    LDH:  No results found for: LDH  Uric Acid:    Lab Results   Component Value Date    LABURIC 9.9 02/10/2021       PT/INR:  No results found for: PROTIME, INR  Warfarin PT/INR:  No components found for: TECHNOLOGIST PROVIDED HISTORY: Reason for exam:->increased urinary frequency, fever Additional Contrast?->None Decision Support Exception->Emergency Medical Condition (MA) Reason for Exam: increased urinary frequency, fever. Urinary Frequency (Increase in needing to use the bathroom, urine with with an odor. Chills and feverr, fatigue). Acuity: Acute Type of Exam: Initial FINDINGS: Lower Chest: There hazy subsegmental linear opacities along the lung bases which is more prominent on the right. Organs: The liver is mildly enlarged with fatty replacement throughout. The gallbladder, pancreas, and bile ducts are normal.  The spleen is unremarkable. The kidneys are grossly normal size there is mild to moderate perirenal stranding bilaterally which is more prominent on the left. There is mild stranding and minimal fluid in the perirenal space bilaterally which is more prominent on the left extends inferiorly along the psoas muscles with no focal fluid collection or mass. No hydronephrosis, renal stone or mass is seen. GI/Bowel: There is mild feces scattered in the colon. The appendix is normal with no bowel obstruction. The mesentery is unremarkable. There are several small air-fluid levels throughout the bowel which is not significantly dilated. Pelvis: The bladder is not well distended. The prostate gland is mildly enlarged. No pelvic mass is seen. There is no adenopathy or ascites seen. Peritoneum/Retroperitoneum:   The ureters are normal caliber. The aorta is unremarkable. There is no periaortic mass or fluid collection and no retroperitoneal adenopathy is seen. Bones/Soft Tissues: The bones are intact. No aggressive osseous lesion is seen. Moderate perirenal stranding around both kidneys mild stranding and minimal fluid in the pararenal space which is more prominent on the left and extends inferiorly along the psoas muscles with no focal fluid collection or mass.  This could represent inflammatory changes from bilateral pyelonephritis or possible perirenal scarring. No hydronephrosis or urinary obstruction is seen. Recommend clinical follow-up. Mild subsegmental atelectasis or early infiltrates along the lung bases. Small hiatal hernia. Mild chronic liver changes with fatty replacement throughout Small air-fluid levels throughout the bowel which could represent early ileus. Recommend follow-up. Mild constipation. Mild prostatic enlargement. Xr Chest Portable    Result Date: 2/10/2021  EXAMINATION: ONE XRAY VIEW OF THE CHEST 2/10/2021 7:19 pm COMPARISON: None. HISTORY: ORDERING SYSTEM PROVIDED HISTORY: cough, fever TECHNOLOGIST PROVIDED HISTORY: Reason for exam:->cough, fever Reason for Exam: Increase in needing to use the bathroom, urine with with an odor. Chills and feverr, fatigue Acuity: Unknown Type of Exam: Initial FINDINGS: The patient is rotated. The cardiac silhouette, mediastinal and hilar contours are normal.  Right hemidiaphragm is slightly elevated. The lungs are clear. There are no pleural effusions. No acute osseous abnormalities are identified. No acute cardiopulmonary disease.

## 2021-02-12 NOTE — PLAN OF CARE
Problem: Breathing Pattern - Ineffective:  Goal: Ability to achieve and maintain a regular respiratory rate will improve  Description: Ability to achieve and maintain a regular respiratory rate will improve  2/12/2021 0411 by Rosa Dobbins RN  Outcome: Ongoing  2/12/2021 0411 by Rosa Dobbins RN

## 2021-02-12 NOTE — ADT AUTH CERT
Renal Failure, Acute - Care Day 2 (2/11/2021) by Frances Yeboah RN       Review Status Review Entered   Completed 2/11/2021 14:25      Criteria Review      Care Day: 2 Care Date: 2/11/2021 Level of Care: Inpatient Floor    Guideline Day 2    Level Of Care    (X) Floor    2/11/2021 2:24 PM EST by Jocelyn Wilkins      MS    Clinical Status    ( ) * Electrolyte abnormalities absent or improved    ( ) * Acid-base abnormalities absent or improved    (X) * Hypotension absent    Routes    (X) Parenteral or oral hydration    2/11/2021 2:24 PM EST by Jocelyn Wilkins      0.9 % sodium chloride infusion   Rate: 100 mL/hr    (X) Parenteral or oral medications    2/11/2021 2:24 PM EST by Jocelyn Spice      meds noted below    (X) Renal diet as tolerated    2/11/2021 2:24 PM EST by Jocelyn Spice      DIET CARB CONTROL;  Renal    Interventions    (X) Possible dialysis    (X) Possible renal biopsy    (X) Monitor electrolytes, renal function tests, acid-base, and volume status    Medications    (X) Possible medical therapies    * Milestone   Additional Notes   2/11                  Results for Tiana Fregoso (MRN [id number]) as of 2/11/2021 14:25      2/11/2021 00:45   POC Glucose: 112 (H)      2/11/2021 01:48   POC Glucose: 153 (H)      2/11/2021 04:15   Magnesium: 2.30   Total CK: 154      2/11/2021 04:15   Magnesium: 2.30      2/11/2021 04:16   Sodium: 131 (L)   Potassium: 3.0 (L)   Chloride: 97 (L)   CO2: 24   BUN: 44 (H)   Creatinine: 2.8 (H)   Anion Gap: 10   GFR Non-: 24 (A)   GFR : 29 (A)   Glucose: 156 (H)   Calcium: 8.4   Phosphorus: 3.3   Albumin: 3.0 (L)   WBC: 20.4 (H)   RBC: 4.07 (L)   Hemoglobin Quant: 11.6 (L)   Hematocrit: 35.6 (L)   MCV: 87.5   MCH: 28.6   MCHC: 32.7   MPV: 7.7   RDW: 13.7   Platelet Count: 311      2/11/2021 07:42   POC Glucose: 139 (H)      2/11/2021 11:29   POC Glucose: 176 (H)   ----------------   Scheduled Meds:   · sodium chloride flush 10 mL Intravenous 2 times per day   · pravastatin 20 mg Oral Daily   · heparin (porcine) 5,000 Units Subcutaneous 3 times per day   · cefepime 1,000 mg Intravenous Q24H   · lactobacillus 1 capsule Oral BID WC   · insulin lispro 0-6 Units Subcutaneous TID WC   · insulin lispro 0-3 Units Subcutaneous Nightly   · albuterol sulfate HFA 2 puff Inhalation 4x daily   And   · ipratropium 2 puff Inhalation 4x daily      potassium chloride (KLOR-CON M) extended release tablet 40 mEq x1      Continuous Infusions:   · sodium chloride 100 mL/hr           PRN   acetaminophen (TYLENOL) tablet 1,000 mg x1   benzonatate (TESSALON) capsule 100 mg x2

## 2021-02-13 LAB
ALBUMIN SERPL-MCNC: 3.2 G/DL (ref 3.4–5)
ANION GAP SERPL CALCULATED.3IONS-SCNC: 9 MMOL/L (ref 3–16)
BUN BLDV-MCNC: 17 MG/DL (ref 7–20)
CALCIUM SERPL-MCNC: 8.6 MG/DL (ref 8.3–10.6)
CHLORIDE BLD-SCNC: 106 MMOL/L (ref 99–110)
CO2: 25 MMOL/L (ref 21–32)
CREAT SERPL-MCNC: 1.1 MG/DL (ref 0.9–1.3)
CULTURE, BLOOD 2: ABNORMAL
GFR AFRICAN AMERICAN: >60
GFR NON-AFRICAN AMERICAN: >60
GLUCOSE BLD-MCNC: 100 MG/DL (ref 70–99)
GLUCOSE BLD-MCNC: 105 MG/DL (ref 70–99)
GLUCOSE BLD-MCNC: 106 MG/DL (ref 70–99)
GLUCOSE BLD-MCNC: 117 MG/DL (ref 70–99)
GLUCOSE BLD-MCNC: 95 MG/DL (ref 70–99)
HCT VFR BLD CALC: 34.7 % (ref 40.5–52.5)
HEMOGLOBIN: 11.3 G/DL (ref 13.5–17.5)
MAGNESIUM: 2.5 MG/DL (ref 1.8–2.4)
MCH RBC QN AUTO: 28.2 PG (ref 26–34)
MCHC RBC AUTO-ENTMCNC: 32.7 G/DL (ref 31–36)
MCV RBC AUTO: 86.3 FL (ref 80–100)
ORGANISM: ABNORMAL
PDW BLD-RTO: 14.2 % (ref 12.4–15.4)
PERFORMED ON: ABNORMAL
PERFORMED ON: NORMAL
PHOSPHORUS: 1.4 MG/DL (ref 2.5–4.9)
PLATELET # BLD: 439 K/UL (ref 135–450)
PMV BLD AUTO: 8 FL (ref 5–10.5)
POTASSIUM SERPL-SCNC: 3.3 MMOL/L (ref 3.5–5.1)
POTASSIUM SERPL-SCNC: 3.4 MMOL/L (ref 3.5–5.1)
RBC # BLD: 4.02 M/UL (ref 4.2–5.9)
SODIUM BLD-SCNC: 140 MMOL/L (ref 136–145)
WBC # BLD: 12.6 K/UL (ref 4–11)

## 2021-02-13 PROCEDURE — 83735 ASSAY OF MAGNESIUM: CPT

## 2021-02-13 PROCEDURE — 85027 COMPLETE CBC AUTOMATED: CPT

## 2021-02-13 PROCEDURE — 36415 COLL VENOUS BLD VENIPUNCTURE: CPT

## 2021-02-13 PROCEDURE — 2060000000 HC ICU INTERMEDIATE R&B

## 2021-02-13 PROCEDURE — 6370000000 HC RX 637 (ALT 250 FOR IP): Performed by: INTERNAL MEDICINE

## 2021-02-13 PROCEDURE — 80069 RENAL FUNCTION PANEL: CPT

## 2021-02-13 PROCEDURE — 6360000002 HC RX W HCPCS: Performed by: PHYSICIAN ASSISTANT

## 2021-02-13 PROCEDURE — 6370000000 HC RX 637 (ALT 250 FOR IP): Performed by: PHYSICIAN ASSISTANT

## 2021-02-13 PROCEDURE — 84132 ASSAY OF SERUM POTASSIUM: CPT

## 2021-02-13 PROCEDURE — 94640 AIRWAY INHALATION TREATMENT: CPT

## 2021-02-13 PROCEDURE — 2580000003 HC RX 258: Performed by: PHYSICIAN ASSISTANT

## 2021-02-13 PROCEDURE — 6360000002 HC RX W HCPCS: Performed by: INTERNAL MEDICINE

## 2021-02-13 PROCEDURE — 94761 N-INVAS EAR/PLS OXIMETRY MLT: CPT

## 2021-02-13 PROCEDURE — 2580000003 HC RX 258: Performed by: INTERNAL MEDICINE

## 2021-02-13 RX ORDER — PANTOPRAZOLE SODIUM 40 MG/10ML
40 INJECTION, POWDER, LYOPHILIZED, FOR SOLUTION INTRAVENOUS
Status: DISCONTINUED | OUTPATIENT
Start: 2021-02-13 | End: 2021-02-14 | Stop reason: HOSPADM

## 2021-02-13 RX ORDER — SODIUM CHLORIDE 9 MG/ML
INJECTION, SOLUTION INTRAVENOUS CONTINUOUS
Status: DISCONTINUED | OUTPATIENT
Start: 2021-02-13 | End: 2021-02-14

## 2021-02-13 RX ORDER — CHLORPROMAZINE HYDROCHLORIDE 10 MG/1
10 TABLET, FILM COATED ORAL 3 TIMES DAILY PRN
Status: DISCONTINUED | OUTPATIENT
Start: 2021-02-13 | End: 2021-02-14 | Stop reason: HOSPADM

## 2021-02-13 RX ADMIN — POTASSIUM PHOSPHATE, MONOBASIC 500 MG: 500 TABLET, SOLUBLE ORAL at 17:08

## 2021-02-13 RX ADMIN — BENZONATATE 100 MG: 100 CAPSULE ORAL at 22:26

## 2021-02-13 RX ADMIN — CEFEPIME HYDROCHLORIDE 2000 MG: 2 INJECTION, POWDER, FOR SOLUTION INTRAVENOUS at 09:50

## 2021-02-13 RX ADMIN — CEFEPIME HYDROCHLORIDE 2000 MG: 2 INJECTION, POWDER, FOR SOLUTION INTRAVENOUS at 22:21

## 2021-02-13 RX ADMIN — Medication 1 CAPSULE: at 17:08

## 2021-02-13 RX ADMIN — HEPARIN SODIUM 5000 UNITS: 5000 INJECTION INTRAVENOUS; SUBCUTANEOUS at 22:21

## 2021-02-13 RX ADMIN — PRAVASTATIN SODIUM 20 MG: 20 TABLET ORAL at 08:27

## 2021-02-13 RX ADMIN — Medication 1 CAPSULE: at 08:27

## 2021-02-13 RX ADMIN — IPRATROPIUM BROMIDE AND ALBUTEROL SULFATE 1 AMPULE: .5; 3 SOLUTION RESPIRATORY (INHALATION) at 08:59

## 2021-02-13 RX ADMIN — IPRATROPIUM BROMIDE AND ALBUTEROL SULFATE 1 AMPULE: .5; 3 SOLUTION RESPIRATORY (INHALATION) at 21:14

## 2021-02-13 RX ADMIN — Medication 10 ML: at 08:15

## 2021-02-13 RX ADMIN — HEPARIN SODIUM 5000 UNITS: 5000 INJECTION INTRAVENOUS; SUBCUTANEOUS at 17:08

## 2021-02-13 RX ADMIN — SODIUM CHLORIDE: 9 INJECTION, SOLUTION INTRAVENOUS at 06:24

## 2021-02-13 RX ADMIN — IPRATROPIUM BROMIDE AND ALBUTEROL SULFATE 1 AMPULE: .5; 3 SOLUTION RESPIRATORY (INHALATION) at 12:25

## 2021-02-13 RX ADMIN — HEPARIN SODIUM 5000 UNITS: 5000 INJECTION INTRAVENOUS; SUBCUTANEOUS at 06:24

## 2021-02-13 RX ADMIN — IPRATROPIUM BROMIDE AND ALBUTEROL SULFATE 1 AMPULE: .5; 3 SOLUTION RESPIRATORY (INHALATION) at 16:57

## 2021-02-13 ASSESSMENT — PAIN SCALES - GENERAL: PAINLEVEL_OUTOF10: 0

## 2021-02-13 NOTE — PLAN OF CARE
Problem: Breathing Pattern - Ineffective:  Goal: Ability to achieve and maintain a regular respiratory rate will improve  2/13/2021 1857 by Miki Peace RN  Outcome: Ongoing     Problem: Cardiovascular  Goal: Hemodynamic stability  2/13/2021 1857 by Miki Peace RN  Outcome: Ongoing     Problem: Respiratory  Goal: No pulmonary complications  2/46/8574 1857 by Miki Peace RN  Outcome: Ongoing

## 2021-02-13 NOTE — PROGRESS NOTES
Hospitalist Progress Note      PCP: Konstantin Dallas MD    Date of Admission: 2/10/2021    Chief Complaint: Fever and dysuria    Hospital Course: Admitted with fever and dysuria. Found to have bilateral pyelonephritis and bacteremia. Started on cefepime. Has acute renal failure upon admission. Nephrology consulted. Nephrotoxins held. Started on IV fluids. Creatinine slowly improving. Patient also has some cough and congestion for last few days. COVID-19 test came back negative. Chest x-ray clear. Richey removed today. Has hypokalemia and potassium being replaced as well. Subjective:    Doing better overall. Not much dysuria. Some pain in the bilateral flank areas. No chest pain or shortness of breath. On RA.      Medications:  Reviewed    Infusion Medications    sodium chloride 100 mL/hr at 02/13/21 0950    dextrose       Scheduled Medications    potassium phosphate (monobasic)  500 mg Oral TID WC    pantoprazole  40 mg Intravenous QAM AC    cefepime  2,000 mg Intravenous Q12H    ipratropium-albuterol  1 ampule Inhalation Q4H WA    sodium chloride flush  10 mL Intravenous 2 times per day    pravastatin  20 mg Oral Daily    heparin (porcine)  5,000 Units Subcutaneous 3 times per day    lactobacillus  1 capsule Oral BID WC    insulin lispro  0-6 Units Subcutaneous TID WC    insulin lispro  0-3 Units Subcutaneous Nightly     PRN Meds: chlorproMAZINE, potassium chloride **OR** potassium alternative oral replacement **OR** potassium chloride, sodium chloride flush, acetaminophen, benzonatate, glucose, dextrose, glucagon (rDNA), dextrose, albuterol sulfate HFA      Intake/Output Summary (Last 24 hours) at 2/13/2021 1359  Last data filed at 2/13/2021 1009  Gross per 24 hour   Intake 4684 ml   Output 6175 ml   Net -1491 ml       Physical Exam Performed:    /74   Pulse 91   Temp 98.7 °F (37.1 °C) (Oral)   Resp 16   Ht 6' 2.25\" (1.886 m)   Wt 218 lb 8 oz (99.1 kg)   SpO2 97%   BMI 27.87 kg/m²     General appearance: No apparent distress, appears stated age and cooperative. HEENT: Pupils equal, round, and reactive to light. Conjunctivae/corneas clear. Neck: Supple, with full range of motion. No jugular venous distention. Trachea midline. Respiratory:  Normal respiratory effort. Clear to auscultation, bilaterally without Rales/Wheezes/Rhonchi. Cardiovascular: Regular rate and rhythm with normal S1/S2 without murmurs, rubs or gallops. Abdomen: Soft, mildly tender bilaterally in the flanks, non-distended with normal bowel sounds. Musculoskeletal: No clubbing, cyanosis or edema bilaterally. Full range of motion without deformity. Skin: Skin color, texture, turgor normal.  No rashes or lesions. Neurologic:  Neurovascularly intact without any focal sensory/motor deficits. Cranial nerves: II-XII intact, grossly non-focal.  Psychiatric: Alert and oriented, thought content appropriate, normal insight  Capillary Refill: Brisk,< 3 seconds   Peripheral Pulses: +2 palpable, equal bilaterally       Labs:   Recent Labs     02/11/21  0416 02/12/21  0546 02/13/21  0448   WBC 20.4* 15.1* 12.6*   HGB 11.6* 11.9* 11.3*   HCT 35.6* 36.9* 34.7*    404 439     Recent Labs     02/11/21  0416 02/12/21  0546 02/12/21  1747 02/13/21  0255 02/13/21  0448   * 136  --   --  140   K 3.0* 2.9* 3.2* 3.3* 3.4*   CL 97* 100  --   --  106   CO2 24 26  --   --  25   BUN 44* 28*  --   --  17   CREATININE 2.8* 1.5*  --   --  1.1   CALCIUM 8.4 8.3  --   --  8.6   PHOS 3.3 2.0*  --   --  1.4*     Recent Labs     02/10/21  1923   AST 22   ALT 24   BILITOT 0.6   ALKPHOS 62     No results for input(s): INR in the last 72 hours.   Recent Labs     02/11/21 0415   CKTOTAL 154       Urinalysis:      Lab Results   Component Value Date    NITRU Negative 02/10/2021    WBCUA >900 02/10/2021    BACTERIA 4+ 02/10/2021    RBCUA 25 02/10/2021    BLOODU LARGE 02/10/2021    SPECGRAV 1.014 02/10/2021    GLUCOSEU Negative 02/10/2021       Radiology:  CT ABDOMEN PELVIS WO CONTRAST Additional Contrast? None   Final Result   Moderate perirenal stranding around both kidneys mild stranding and minimal   fluid in the pararenal space which is more prominent on the left and extends   inferiorly along the psoas muscles with no focal fluid collection or mass. This could represent inflammatory changes from bilateral pyelonephritis or   possible perirenal scarring. No hydronephrosis or urinary obstruction is   seen. Recommend clinical follow-up. Mild subsegmental atelectasis or early infiltrates along the lung bases. Small hiatal hernia. Mild chronic liver changes with fatty replacement throughout      Small air-fluid levels throughout the bowel which could represent early   ileus. Recommend follow-up. Mild constipation. Mild prostatic enlargement. XR CHEST PORTABLE   Final Result   No acute cardiopulmonary disease. Assessment/Plan:    Active Hospital Problems    Diagnosis    SILVIA (acute kidney injury) ([facility] [N17.9]       Acute kidney injury: Secondary to pyelonephritis and Hyzaar. Holding nephrotoxic's. On IV fluids per nephrology. Creatinine slowly trending down   Continue IV fluid hydration for another day. Continue strict I's and O's. Bilateral pyelonephritis: Urine cultures grew E. coli. Bacteremia noted as well. On cefepime. Continue antibiotics. Repeat blood cultures. Sepsis present on admission: Presented with fever, tachycardia, leukocytosis, tachypnea. Secondary UTI. Improving overall. Cough/hypoxia: Suspect secondary atelectasis. Chest x-ray clear. COVID-19 negative. Increased mobility. Incentive spirometry. On RA. Hypertension: Procardia. Stopped Hyzaar. Diabetes mellitus type 2: A1c 6.7. Sliding scale insulin here. Hyperlipidemia: Statin    Prostatic enlargement: Noted on CT. PSA pending.      DVT Prophylaxis: Heparin  Diet: DIET CARB CONTROL;  Renal  Code Status: Full Code    PT/OT Eval Status: Not needed    Dispo - inpatient 1 to 2 days    Jeniffer Quevedo MD

## 2021-02-13 NOTE — PLAN OF CARE
Problem: Breathing Pattern - Ineffective:  Goal: Ability to achieve and maintain a regular respiratory rate will improve  Outcome: Ongoing     Problem: Cardiovascular  Goal: Hemodynamic stability  Outcome: Ongoing     Problem: Respiratory  Goal: No pulmonary complications  Outcome: Met This Shift     Problem: Respiratory  Goal: O2 Sat > 90%  Outcome: Met This Shift     Problem: Respiratory  Goal: Supplemental O2 requirements decreased  Outcome: Met This Shift     Problem:   Goal: Adequate urinary output  Outcome: Met This Shift     Problem:   Goal: No urinary complication  Outcome: Ongoing     Problem: Skin Integrity/Risk  Goal: No skin breakdown during hospitalization  Outcome: Ongoing

## 2021-02-13 NOTE — PROGRESS NOTES
MD Catrachita Camacho MD Kayren Finely, MD               Office: [phone]                      Fax: [phone]             6 [facility]                   NEPHROLOGY INPATIENT PROGRESS NOTE:     PATIENT NAME: Precious Vickers  : 1965  MRN: [id number]      RECOMMENDATIONS:     - post ATN - polyuria noted - worsening to 6L    -so keep IVF w/  cc/hr (~ 1 cc/kg/hr) for 1 more day    -Drink when feel thirsty, avoid overhydration,     - BP is better,  - From home med-continue holding Hyzaar  - Stoped nifedipine - ok allow slightly higher BP for short term     -antibiotics for Pylo., w/ Cefepime - as per primary team    + ok to replete K per PRN protocol  + order K- Phos PO   + Mag - continue monitoring - not needing repletion     -fleming needed : W/ h/o BPH (noted on CT also)    - did Voiding trial - doing well    -Discussed outpatient work-up and follow-up , might consider urology follow-up as outpatient also   -If BP allows, add Flomax **    D/C plan from renal stand point:   - might need 1 more day due to polyuria - at risk for dehydration,       IMPRESSION:       SILVIA (on proteinuric CKD-2): Severe on admission continues to improve   - non-Oligouric -> Polyuria   - BL Scr- 0.9-1.0 (last in 10/2020)  ---> 3.8 on admission   : Etiology of SILVIA - presumed ATN + prerenal, due to pyelonephritis, bilateral + UTI + Hyzaar    - other differentials: unlikely due to improvement. - UA : Large blood, only few RBCs, but multiple white cells, leukocytes, few hyaline casts,   -Suggestive of prerenal/UTI more likely. - checked CK w/ large blood, but only 25 RBCs on UA, so ruled out rhabdo w/ CK WNL   -Renal imaging: w/ CT scan reviewed:    -urine lytes = might be suggestive of prerenal + ATN  -Covid negative      Associated problems:   - Azotemia: Prerenal, follow with IV fluids,. Unlikely upper GI bleed or steroid induced - as improved.      - Electrolytes:   : K: Hypokalemia-needing repletion, likely due to polyuria + previous diuretics use,  : Magnesium - slightly higher, monitor, as trending down to normal   : Hypophosphatemia- likely due to polyuria + decreased in diet -needing repletion and monitoring    - Volume status: Mild hypo - volemic - better w/ IVFs  : Na: Hyponatremia, moderate -due to SILVIA + HCTZ (in Hyzaar) - better   : Hypertension history- but BP on a Lower side,    - Acid-Base: normal.   - Anemia: Mild      Other problems: Management per primary and other consulting teams. Sepsis, likely UTI, Pyelo. Diabetes type 2, last A1c controlled, less than 7,   -Diet control only,  History of hypertension    Hospital Problems           Last Modified POA    [facility]IL[facility]acute kidney [facility]) [facility] 2/10/2021 Yes        Please refer to the orders. High Complexity. Multiple complex problems. Discussed with patient, patient's wife - hamilton ,  treatment team -hospitalist  Thank you for allowing me to participate in this patient's care. Please do not hesitate to contact me with any questions/concerns. We will follow along with you. Teresa Lopez MD  [facility] [address] [place]: [phone] or [facility] [employer]  Fax: [phone]        ========================================================   ========================================================     Subjective:   Patient was seen at bedside  His wife is Yen Peres at bedside today    Seen resting in bed  No acute complaints,  Reported feeling better overall  Patient is attempting to drink more, with more urine output noted    Past medical, Surgical, Social, Family medical history reviewed by me -as needed for my evaluation  MEDICATIONS: reviewed by me. Medications Prior to Admission:  No current facility-administered medications on file prior to encounter.       Current Outpatient Medications on File Prior to Encounter   Medication Sig Dispense Refill    NIFEdipine (PROCARDIA XL) 90 MG extended release tablet TAKE ONE TABLET BY MOUTH DAILY 30 tablet 4    losartan-hydroCHLOROthiazide (HYZAAR) 100-25 MG per tablet TAKE ONE TABLET BY MOUTH DAILY 30 tablet 5    pravastatin (PRAVACHOL) 20 MG tablet Take 1 tablet by mouth daily 90 tablet 1    omeprazole (PRILOSEC) 40 MG delayed release capsule TAKE ONE CAPSULE BY MOUTH EVERY MORNING BEFORE BREAKFAST AS NEEDED (REFLUX) 30 capsule 5    Multiple Vitamins-Minerals (THERAPEUTIC MULTIVITAMIN-MINERALS) tablet Take 1 tablet by mouth daily           Current Facility-Administered Medications:     potassium chloride (KLOR-CON M) extended release tablet 40 mEq, 40 mEq, Oral, PRN **OR** potassium bicarb-citric acid (EFFER-K) effervescent tablet 40 mEq, 40 mEq, Oral, PRN **OR** potassium chloride 10 mEq/100 mL IVPB (Peripheral Line), 10 mEq, Intravenous, PRN, Frances Copeland MD, Last Rate: 100 mL/hr at 02/12/21 0759, 10 mEq at 02/12/21 0759    cefepime (MAXIPIME) 2000 mg IVPB minibag, 2,000 mg, Intravenous, Q12H, Tiffany Solano MD, Stopped at 02/12/21 2327    ipratropium-albuterol (DUONEB) nebulizer solution 1 ampule, 1 ampule, Inhalation, Q4H WA, Tiffany Solano MD, 1 ampule at 02/13/21 0859    0.9 % sodium chloride infusion, , Intravenous, Continuous, Jose Wells PA-C, Last Rate: 100 mL/hr at 02/13/21 0624, New Bag at 02/13/21 0624    sodium chloride flush 0.9 % injection 10 mL, 10 mL, Intravenous, 2 times per day, Jose Wells PA-C, 10 mL at 02/11/21 2217    sodium chloride flush 0.9 % injection 10 mL, 10 mL, Intravenous, PRN, Jose Wells PA-C    acetaminophen (TYLENOL) tablet 1,000 mg, 1,000 mg, Oral, Q6H PRN, Jose Wells PA-C, 1,000 mg at 02/11/21 2221    pravastatin (PRAVACHOL) tablet 20 mg, 20 mg, Oral, Daily, Elaine Gomez PA-C, 20 mg at 02/13/21 0827    heparin (porcine) injection 5,000 Units, 5,000 Units, Subcutaneous, 3 times per day, Jose Wells PA-C, 5,000 Units at 02/13/21 1190    benzonatate (TESSALON) capsule 100 mg, 100 mg, Oral, TID PRN, Zenaida Whitman PA-C, 100 mg at 02/11/21 2221    lactobacillus (CULTURELLE) capsule 1 capsule, 1 capsule, Oral, BID , Zenaida Whitman PA-C, 1 capsule at 02/13/21 0827    insulin lispro (1 Unit Dial) 0-6 Units, 0-6 Units, Subcutaneous, TID , Elaine Gomez PA-C    insulin lispro (1 Unit Dial) 0-3 Units, 0-3 Units, Subcutaneous, Nightly, Elaine Gomez PA-C    glucose (GLUTOSE) 40 % oral gel 15 g, 15 g, Oral, PRN, Elaine Gomez PA-C    dextrose 50 % IV solution, 12.5 g, Intravenous, PRN, Elaine Gomez PA-C    glucagon (rDNA) injection 1 mg, 1 mg, Intramuscular, PRN, Elaine Gomez PA-C    dextrose 5 % solution, 100 mL/hr, Intravenous, PRN, Elaine Gomez PA-C    albuterol sulfate  (90 Base) MCG/ACT inhaler 2 puff, 2 puff, Inhalation, Q6H PRN, Zenaida Whitman PA-C      PHYSICAL EXAM:  Recent vital signs and recent I/Os reviewed by me.      Wt Readings from Last 3 Encounters:   02/13/21 218 lb 8 oz (99.1 kg)   12/03/20 234 lb (106.1 kg)   08/31/20 237 lb (107.5 kg)     BP Readings from Last 3 Encounters:   02/13/21 125/74   12/03/20 126/76   08/31/20 (!) 142/90     Patient Vitals for the past 24 hrs:   BP Temp Temp src Pulse Resp SpO2 Weight   02/13/21 0859 -- -- -- -- 16 98 % --   02/13/21 0827 -- -- -- -- -- -- 218 lb 8 oz (99.1 kg)   02/13/21 0815 125/74 98.7 °F (37.1 °C) Oral 91 16 96 % --   02/13/21 0503 119/78 99.5 °F (37.5 °C) Oral 95 16 95 % --   02/13/21 0040 (!) 146/55 98.9 °F (37.2 °C) Oral 102 16 95 % --   02/12/21 2300 -- -- -- -- -- 95 % --   02/12/21 2150 -- -- -- -- -- 94 % --   02/12/21 2045 -- -- -- -- -- 95 % --   02/12/21 2035 116/70 99.7 °F (37.6 °C) Oral 100 14 95 % --   02/12/21 2023 -- -- -- -- 18 96 % --   02/12/21 1615 126/76 -- -- 94 18 95 % --   02/12/21 1100 113/64 98.6 °F (37 °C) Oral 98 18 94 % --       Intake/Output Summary (Last 24 hours) at 2/13/2021 0095  Last data filed at 2/13/2021 5946  Gross per 24 hour   Intake 4444 ml   Output 5605 ml   Net -1161 ml         Physical exam:    General: Awake, Alert, communicative  No acute distress  Neck: Supple, JVD not visible. CVS:  Heart sounds are normal. No loud murmur. RS: Normal respiratory effort, Breat sound: Normal  Abd: Soft , bowel sounds are normal  CNS: Awake Oriented , moving all extremities   Extremities/MSK: trace Edema, no cyanosis.        DATA:  Diagnostic tests reviewed by me for today's visit:    Recent Labs     02/10/21  1923 02/11/21  0416 02/12/21  0546 02/13/21 0448   WBC 24.8* 20.4* 15.1* 12.6*   HCT 38.5* 35.6* 36.9* 34.7*   * 410 404 439         Recent Labs     02/10/21  1923 02/11/21  0416 02/12/21  0546 02/12/21  1747 02/13/21  0255 02/13/21 0448   * 131* 136  --   --  140   K 3.1* 3.0* 2.9* 3.2* 3.3* 3.4*   CL 90* 97* 100  --   --  106   CO2 24 24 26  --   --  25   BUN 41* 44* 28*  --   --  17   CREATININE 3.8* 2.8* 1.5*  --   --  1.1     Recent Labs     02/10/21  1923 02/11/21  0415 02/11/21  0416 02/12/21  0546 02/13/21 0448   CALCIUM 9.0  --  8.4 8.3 8.6   MG 2.10 2.30  2.30  --  2.70* 2.50*   PHOS  --   --  3.3 2.0* 1.4*         Lab Results   Component Value Date    LABURIC 9.9 02/10/2021           U/A:    Lab Results   Component Value Date    COLORU DK YELLOW 02/10/2021    PROTEINU 100 02/10/2021    PHUR 6.0 02/10/2021    WBCUA >900 02/10/2021    RBCUA 25 02/10/2021    BACTERIA 4+ 02/10/2021    CLARITYU TURBID 02/10/2021    SPECGRAV 1.014 02/10/2021    LEUKOCYTESUR LARGE 02/10/2021    UROBILINOGEN 1.0 02/10/2021    BILIRUBINUR Negative 02/10/2021    BLOODU LARGE 02/10/2021    GLUCOSEU Negative 02/10/2021     Microalbumen/Creatinine ratio:  No components found for: RUCREAT  24 Hour Urine for Protein:  No components found for: RAWUPRO, UHRS3, ASDP84XT, UTV3  24 Hour Urine for Creatinine Clearance:  No components found for: CREAT4, UHRS10, UTV10  Urine Toxicology:  No components found for: IAMMENTA, IBARBIT, IBENZO, ICOCAINE, IMARTHC, IOPIATES, IPHENCYC    HgBA1c:    Lab Results   Component Value Date    LABA1C 6.2 02/10/2021             BELOW MENTIONED RADIOLOGY STUDY RESULTS BY ME:    Ct Abdomen Pelvis Wo Contrast Additional Contrast? None    Result Date: 2/10/2021    Moderate perirenal stranding around both kidneys mild stranding and minimal fluid in the pararenal space which is more prominent on the left and extends inferiorly along the psoas muscles with no focal fluid collection or mass. This could represent inflammatory changes from bilateral pyelonephritis or possible perirenal scarring. No hydronephrosis or urinary obstruction is seen. Recommend clinical follow-up. Mild subsegmental atelectasis or early infiltrates along the lung bases. Small hiatal hernia. Mild chronic liver changes with fatty replacement throughout Small air-fluid levels throughout the bowel which could represent early ileus. Recommend follow-up. Mild constipation. Mild prostatic enlargement. Xr Chest Portable    Result Date: 2/10/2021  EXAMINATION: ONE XRAY VIEW OF THE CHEST 2/10/2021 7:19 pm COMPARISON: None. HISTORY: ORDERING SYSTEM PROVIDED HISTORY: cough, fever TECHNOLOGIST PROVIDED HISTORY: Reason for exam:->cough, fever Reason for Exam: Increase in needing to use the bathroom, urine with with an odor. Chills and feverr, fatigue Acuity: Unknown Type of Exam: Initial FINDINGS: The patient is rotated. The cardiac silhouette, mediastinal and hilar contours are normal.  Right hemidiaphragm is slightly elevated. The lungs are clear. There are no pleural effusions. No acute osseous abnormalities are identified. No acute cardiopulmonary disease.

## 2021-02-13 NOTE — PROGRESS NOTES
Incentive Spirometry education and demonstration completed by Respiratory Therapy Yes      Response to education: Excellent     Teaching Time: 5 minutes    Minimum Predicted Vital Capacity - 822 mL. Patient's Actual Vital Capacity - 2000 mL. Turning over to Nursing for routine follow-up Yes.     Comments: goal met    Electronically signed by Cathryn Alexander RCP on 2/12/2021 at 8:27 PM

## 2021-02-14 VITALS
HEART RATE: 76 BPM | HEIGHT: 74 IN | DIASTOLIC BLOOD PRESSURE: 80 MMHG | OXYGEN SATURATION: 99 % | SYSTOLIC BLOOD PRESSURE: 146 MMHG | RESPIRATION RATE: 16 BRPM | TEMPERATURE: 98 F | BODY MASS INDEX: 28.04 KG/M2 | WEIGHT: 218.5 LBS

## 2021-02-14 LAB
ALBUMIN SERPL-MCNC: 3.1 G/DL (ref 3.4–5)
ANION GAP SERPL CALCULATED.3IONS-SCNC: 11 MMOL/L (ref 3–16)
BLOOD CULTURE, ROUTINE: ABNORMAL
BUN BLDV-MCNC: 13 MG/DL (ref 7–20)
CALCIUM SERPL-MCNC: 8.4 MG/DL (ref 8.3–10.6)
CHLORIDE BLD-SCNC: 106 MMOL/L (ref 99–110)
CO2: 24 MMOL/L (ref 21–32)
CREAT SERPL-MCNC: 1 MG/DL (ref 0.9–1.3)
GFR AFRICAN AMERICAN: >60
GFR NON-AFRICAN AMERICAN: >60
GLUCOSE BLD-MCNC: 108 MG/DL (ref 70–99)
GLUCOSE BLD-MCNC: 108 MG/DL (ref 70–99)
GLUCOSE BLD-MCNC: 96 MG/DL (ref 70–99)
HCT VFR BLD CALC: 34.4 % (ref 40.5–52.5)
HEMOGLOBIN: 11.3 G/DL (ref 13.5–17.5)
MAGNESIUM: 2.3 MG/DL (ref 1.8–2.4)
MCH RBC QN AUTO: 28.6 PG (ref 26–34)
MCHC RBC AUTO-ENTMCNC: 32.7 G/DL (ref 31–36)
MCV RBC AUTO: 87.2 FL (ref 80–100)
ORGANISM: ABNORMAL
PDW BLD-RTO: 13.9 % (ref 12.4–15.4)
PERFORMED ON: ABNORMAL
PERFORMED ON: NORMAL
PHOSPHORUS: 2.5 MG/DL (ref 2.5–4.9)
PLATELET # BLD: 467 K/UL (ref 135–450)
PMV BLD AUTO: 7.3 FL (ref 5–10.5)
POTASSIUM SERPL-SCNC: 3.8 MMOL/L (ref 3.5–5.1)
RBC # BLD: 3.95 M/UL (ref 4.2–5.9)
SODIUM BLD-SCNC: 141 MMOL/L (ref 136–145)
WBC # BLD: 12 K/UL (ref 4–11)

## 2021-02-14 PROCEDURE — C9113 INJ PANTOPRAZOLE SODIUM, VIA: HCPCS | Performed by: INTERNAL MEDICINE

## 2021-02-14 PROCEDURE — 2580000003 HC RX 258: Performed by: INTERNAL MEDICINE

## 2021-02-14 PROCEDURE — 36415 COLL VENOUS BLD VENIPUNCTURE: CPT

## 2021-02-14 PROCEDURE — 80069 RENAL FUNCTION PANEL: CPT

## 2021-02-14 PROCEDURE — 6370000000 HC RX 637 (ALT 250 FOR IP): Performed by: PHYSICIAN ASSISTANT

## 2021-02-14 PROCEDURE — 6360000002 HC RX W HCPCS: Performed by: PHYSICIAN ASSISTANT

## 2021-02-14 PROCEDURE — 83735 ASSAY OF MAGNESIUM: CPT

## 2021-02-14 PROCEDURE — 6360000002 HC RX W HCPCS: Performed by: INTERNAL MEDICINE

## 2021-02-14 PROCEDURE — 6370000000 HC RX 637 (ALT 250 FOR IP): Performed by: INTERNAL MEDICINE

## 2021-02-14 PROCEDURE — 94761 N-INVAS EAR/PLS OXIMETRY MLT: CPT

## 2021-02-14 PROCEDURE — 94640 AIRWAY INHALATION TREATMENT: CPT

## 2021-02-14 PROCEDURE — 85027 COMPLETE CBC AUTOMATED: CPT

## 2021-02-14 PROCEDURE — 2580000003 HC RX 258: Performed by: PHYSICIAN ASSISTANT

## 2021-02-14 RX ORDER — CIPROFLOXACIN 500 MG/1
500 TABLET, FILM COATED ORAL 2 TIMES DAILY
Qty: 28 TABLET | Refills: 0 | Status: SHIPPED | OUTPATIENT
Start: 2021-02-14 | End: 2021-02-28

## 2021-02-14 RX ADMIN — PRAVASTATIN SODIUM 20 MG: 20 TABLET ORAL at 09:38

## 2021-02-14 RX ADMIN — CEFEPIME HYDROCHLORIDE 2000 MG: 2 INJECTION, POWDER, FOR SOLUTION INTRAVENOUS at 09:31

## 2021-02-14 RX ADMIN — PANTOPRAZOLE SODIUM 40 MG: 40 INJECTION, POWDER, FOR SOLUTION INTRAVENOUS at 06:05

## 2021-02-14 RX ADMIN — IPRATROPIUM BROMIDE AND ALBUTEROL SULFATE 1 AMPULE: .5; 3 SOLUTION RESPIRATORY (INHALATION) at 08:48

## 2021-02-14 RX ADMIN — Medication 1 CAPSULE: at 09:38

## 2021-02-14 RX ADMIN — IPRATROPIUM BROMIDE AND ALBUTEROL SULFATE 1 AMPULE: .5; 3 SOLUTION RESPIRATORY (INHALATION) at 11:47

## 2021-02-14 RX ADMIN — POTASSIUM PHOSPHATE, MONOBASIC 500 MG: 500 TABLET, SOLUBLE ORAL at 09:38

## 2021-02-14 RX ADMIN — SODIUM CHLORIDE: 9 INJECTION, SOLUTION INTRAVENOUS at 02:17

## 2021-02-14 RX ADMIN — Medication 10 ML: at 09:01

## 2021-02-14 RX ADMIN — HEPARIN SODIUM 5000 UNITS: 5000 INJECTION INTRAVENOUS; SUBCUTANEOUS at 06:05

## 2021-02-14 ASSESSMENT — PAIN SCALES - GENERAL: PAINLEVEL_OUTOF10: 0

## 2021-02-14 NOTE — PLAN OF CARE
Problem: Breathing Pattern - Ineffective:  Goal: Ability to achieve and maintain a regular respiratory rate will improve  2/14/2021 0433 by Kesha Peck  Outcome: Ongoing  2/13/2021 1857 by Dennis Santana RN  Outcome: Ongoing     Problem: Cardiovascular  Goal: Hemodynamic stability  2/14/2021 0433 by Kesha Peck  Outcome: Ongoing  2/13/2021 1857 by Dennis Santana RN  Outcome: Ongoing     Problem: Respiratory  Goal: No pulmonary complications  8/52/3355 0433 by Kesha Peck  Outcome: Ongoing  2/13/2021 1857 by Dennis Santana RN  Outcome: Ongoing  Goal: O2 Sat > 90%  Outcome: Ongoing  Goal: Supplemental O2 requirements decreased  Outcome: Completed

## 2021-02-14 NOTE — PROGRESS NOTES
MD Zamzam Figueredo MD Adelbert Hanger, MD               Office: [phone]                      Fax: [phone]             6 [facility]                   NEPHROLOGY INPATIENT PROGRESS NOTE:     PATIENT NAME: Kyle Galdamez  : 1965  MRN: [id number]      RECOMMENDATIONS:     - post ATN - polyuria - improved from ~6L to ~3L    - ok to stop IVFs    -Drink when feel thirsty, avoid overhydration,     - BP remains ok controlled ~ 140s/780  - From home med-continue holding Hyzaar  - Stoped nifedipine - Should be ok to keep slightly higher BP for short term ~ 140-150s. - antibiotics for Pylo., w/ Cefepime -> PO abx now as per primary team    - electrolytes improving as polyuria improving   + ok to replete K per PRN protocol - better - no - allow higher K in diet   + order K- Phos PO - controlled now - ok to stop on d/c   + Mag - WNL -  No repletion needed.       -fleming needed : W/ h/o BPH (noted on CT also)    - did Voiding trial - doing well    -again discussed outpatient work-up and follow-up , might consider urology follow-up as outpatient also   -If BP allows, add Flomax **as outpt     D/C plan from renal stand point:  Today     D/W pt, his spouse, hospitalist    Offered to F/u w/ us in 1-2 weeks       IMPRESSION:       SILVIA (on proteinuric CKD-2): continues to improve   - non-Oligouric -> Polyuria -> improving   - BL Scr- 0.9-1.0 (last in 10/2020)  ---> 3.8 on admission -> Estimated Creatinine Clearance: 100 mL/min (based on SCr of 1 mg/dL).    : Etiology of SILVIA - presumed ATN + prerenal, due to pyelonephritis, bilateral + UTI + Hyzaar    - other differentials: unlikely due to improvement. - UA : Large blood, only few RBCs, but multiple white cells, leukocytes, few hyaline casts,   -Suggestive of prerenal/UTI more likely.     - checked CK w/ large blood, but only 25 RBCs on UA, so ruled out rhabdo w/ CK WNL   -Renal imaging: w/ CT scan reviewed: -urine lytes = might be suggestive of prerenal + ATN  -Covid negative      Associated problems:   - Azotemia: Prerenal, Unlikely upper GI bleed or steroid induced - resolved w/ IVF     - Electrolytes:   : K: Hypokalemia-needing repletion, likely due to polyuria + previous diuretics use - better   : Magnesium - slightly higher, monitor, as trending down to normal -- better   : Hypophosphatemia- likely due to polyuria + decreased in diet -needing repletion and monitoring - better     - Volume status: Mild hypo - volemic - improved w/ IVFs  : Na: Hyponatremia, moderate -due to SILVIA + HCTZ (in Hyzaar) - resolved   : Hypertension history-    - Acid-Base: normal.   - Anemia: Mild stable ~ 11. Other problems: Management per primary and other consulting teams. Sepsis, likely UTI, Pyelo. Diabetes type 2, last A1c controlled, less than 7,   -Diet control only,  History of hypertension     Please refer to the orders. High Complexity. Multiple complex problems. Discussed with patient, patient's wife - hamilton ,  treatment team -hospitalist  Thank you for allowing me to participate in this patient's care. Please do not hesitate to contact me with any questions/concerns. We will follow along with you. Kyle Rivas MD  [facility] [address] [place]: [phone] or [facility] [employer]  Fax: [phone]        ========================================================   ========================================================     Subjective:   Patient was seen in chair   His wife is Jay Starling at bedside today also. Again had No acute complaints, feels ready to leave today     No current active complaints. Patient denied chest pain / dizziness/lightheadedness/syncope/ SOB / leg edema. Past medical, Surgical, Social, Family medical history reviewed by me -as needed for my evaluation  MEDICATIONS: reviewed by me.    Medications Prior to Admission:  No current facility-administered medications on file prior to encounter.       Current Outpatient Medications on File Prior to Encounter   Medication Sig Dispense Refill    NIFEdipine (PROCARDIA XL) 90 MG extended release tablet TAKE ONE TABLET BY MOUTH DAILY 30 tablet 4    losartan-hydroCHLOROthiazide (HYZAAR) 100-25 MG per tablet TAKE ONE TABLET BY MOUTH DAILY 30 tablet 5    pravastatin (PRAVACHOL) 20 MG tablet Take 1 tablet by mouth daily 90 tablet 1    omeprazole (PRILOSEC) 40 MG delayed release capsule TAKE ONE CAPSULE BY MOUTH EVERY MORNING BEFORE BREAKFAST AS NEEDED (REFLUX) 30 capsule 5    Multiple Vitamins-Minerals (THERAPEUTIC MULTIVITAMIN-MINERALS) tablet Take 1 tablet by mouth daily           Current Facility-Administered Medications:     potassium phosphate (monobasic) (K-PHOS) tablet 500 mg, 500 mg, Oral, TID WC, Isabela Pedroza MD, 500 mg at 02/13/21 1708    0.9 % sodium chloride infusion, , Intravenous, Continuous, Isabela Pedroza MD, Last Rate: 100 mL/hr at 02/14/21 0217, New Bag at 02/14/21 0217    pantoprazole (PROTONIX) injection 40 mg, 40 mg, Intravenous, QAM AC, Carmelo Hlal MD, 40 mg at 02/14/21 1848    chlorproMAZINE (THORAZINE) tablet 10 mg, 10 mg, Oral, TID PRN, Carmelo Hall MD    potassium chloride (KLOR-CON M) extended release tablet 40 mEq, 40 mEq, Oral, PRN **OR** potassium bicarb-citric acid (EFFER-K) effervescent tablet 40 mEq, 40 mEq, Oral, PRN **OR** potassium chloride 10 mEq/100 mL IVPB (Peripheral Line), 10 mEq, Intravenous, PRN, Maddy Parish MD, Last Rate: 100 mL/hr at 02/12/21 0759, 10 mEq at 02/12/21 0759    cefepime (MAXIPIME) 2000 mg IVPB minibag, 2,000 mg, Intravenous, Q12H, Lety Mari MD, Stopped at 02/13/21 2251    ipratropium-albuterol (DUONEB) nebulizer solution 1 ampule, 1 ampule, Inhalation, Q4H WA, Lety Mari MD, 1 ampule at 02/14/21 0848    sodium chloride flush 0.9 % injection 10 mL, 10 mL, Intravenous, 2 times per day, Yolie Camargo PA-C, 10 mL at 02/13/21 0854   (!) 147/79 98.5 °F (36.9 °C) Oral 78 -- 98 %   02/13/21 1700 (!) 148/76 98.8 °F (37.1 °C) Oral 80 16 --   02/13/21 1657 -- -- -- -- 16 98 %   02/13/21 1225 -- -- -- -- 16 97 %       Intake/Output Summary (Last 24 hours) at 2/14/2021 0906  Last data filed at 2/14/2021 9641  Gross per 24 hour   Intake 3298 ml   Output 3550 ml   Net -252 ml         Physical exam:  Remain unchanged    General: Awake, Alert, communicative  No acute distress  Neck: Supple, JVD not visible. CVS:  Heart sounds are normal. No loud murmur. RS: Normal respiratory effort, Breat sound: Normal  Abd: Soft , bowel sounds are normal  CNS: Awake Oriented , moving all extremities   Extremities/MSK: trace Edema, no cyanosis.        DATA:  Diagnostic tests reviewed by me for today's visit:    Recent Labs     02/12/21  0546 02/13/21 0448 02/14/21 0447   WBC 15.1* 12.6* 12.0*   HCT 36.9* 34.7* 34.4*    439 467*         Recent Labs     02/12/21  0546 02/12/21  1747 02/13/21  0255 02/13/21 0448 02/14/21 0447     --   --  140 141   K 2.9* 3.2* 3.3* 3.4* 3.8     --   --  106 106   CO2 26  --   --  25 24   BUN 28*  --   --  17 13   CREATININE 1.5*  --   --  1.1 1.0     Recent Labs     02/12/21  0546 02/13/21 0448 02/14/21 0447   CALCIUM 8.3 8.6 8.4   MG 2.70* 2.50* 2.30   PHOS 2.0* 1.4* 2.5         Lab Results   Component Value Date    LABURIC 9.9 02/10/2021           U/A:    Lab Results   Component Value Date    COLORU DK YELLOW 02/10/2021    PROTEINU 100 02/10/2021    PHUR 6.0 02/10/2021    WBCUA >900 02/10/2021    RBCUA 25 02/10/2021    BACTERIA 4+ 02/10/2021    CLARITYU TURBID 02/10/2021    SPECGRAV 1.014 02/10/2021    LEUKOCYTESUR LARGE 02/10/2021    UROBILINOGEN 1.0 02/10/2021    BILIRUBINUR Negative 02/10/2021    BLOODU LARGE 02/10/2021    GLUCOSEU Negative 02/10/2021     Microalbumen/Creatinine ratio:  No components found for: RUCREAT  24 Hour Urine for Protein:  No components found for: Larry Ariza Maker  24 Hour Urine for Creatinine Clearance:  No components found for: CREAT4, UHRS10, UTV10  Urine Toxicology:  No components found for: Leo Payment, IBENZO, ICOCAINE, IMARTHC, IOPIATES, IPHENCYC    HgBA1c:    Lab Results   Component Value Date    LABA1C 6.2 02/10/2021             BELOW MENTIONED RADIOLOGY STUDY RESULTS BY ME:    Ct Abdomen Pelvis Wo Contrast Additional Contrast? None    Result Date: 2/10/2021    Moderate perirenal stranding around both kidneys mild stranding and minimal fluid in the pararenal space which is more prominent on the left and extends inferiorly along the psoas muscles with no focal fluid collection or mass. This could represent inflammatory changes from bilateral pyelonephritis or possible perirenal scarring. No hydronephrosis or urinary obstruction is seen. Recommend clinical follow-up. Mild subsegmental atelectasis or early infiltrates along the lung bases. Small hiatal hernia. Mild chronic liver changes with fatty replacement throughout Small air-fluid levels throughout the bowel which could represent early ileus. Recommend follow-up. Mild constipation. Mild prostatic enlargement. Xr Chest Portable    Result Date: 2/10/2021     No acute cardiopulmonary disease.

## 2021-02-14 NOTE — DISCHARGE SUMMARY
Physician Discharge Summary     Patient ID:  Jah Narayanan  [id number]  41 y.o.  1965    Admit date: 2/10/2021    Discharge date and time: 2/14/2021    Admitting Physician: Dalton Nyhan, MD     Discharge Physician: Damion Vargas MD    Admission Diagnoses: SILVIA (acute kidney injury) [facility]) [N17.9]    Discharge Diagnoses: SILVIA, bilateral pyelonephritis    Admission Condition: fair    Discharged Condition: good    Indication for Admission: fever, dysuria     Hospital Course:     50 y.o. male with PMH of HTN and diabetes presented to ED with complaint of pain with urination which started 3 days PTA. Patient reported he had some hematuria in December which resolved without intervention and has had increased urinary frequency at night for the last couple months. Patient also reported cough, nasal congestion, and fever (up to 103) for the last 3 days. CT abdomen with moderate perirenal stranding around both kidneys without hydronephrosis or urinary obstruction. UA notable for >900 WBC, 25 RBC, 4+ bacteria. Started on iv cefepime for bilateral pyelonephritis. Urine cx grew E coli (sensiirve to cipro per micro lab). Seen by Nephrology for SILVIA. Presumed etiology ATN + prerenal. Resolved with iv fluids. CXR neg for acute process. Remained stable on room air. Discharged on 2 weeks of po cipro. Pt might need a repeat CT abdomen to follow up on inflammatory changes from bilateral pyelonephritis or possible perirenal scarring. Will defer to PCP.      Consults: nephrology    Discharge Exam:  BP (!) 146/80   Pulse 76   Temp 98 °F (36.7 °C) (Oral)   Resp 16   Ht 6' 2.25\" (1.886 m)   Wt 218 lb 8 oz (99.1 kg)   SpO2 99%   BMI 27.87 kg/m²     General Appearance:    Alert, cooperative, no distress, appears stated age   Head:    Normocephalic, without obvious abnormality, atraumatic   Eyes:    PERRL, conjunctiva/corneas clear, EOM's intact, fundi     benign, both eyes        Ears:    Normal TM's and external ear canals, both ears   Nose:   Nares normal, septum midline, mucosa normal, no drainage    or sinus tenderness   Throat:   Lips, mucosa, and tongue normal; teeth and gums normal   Neck:   Supple, symmetrical, trachea midline, no adenopathy;        thyroid:  No enlargement/tenderness/nodules; no carotid    bruit or JVD   Back:     Symmetric, no curvature, ROM normal, no CVA tenderness   Lungs:     Clear to auscultation bilaterally, respirations unlabored   Chest wall:    No tenderness or deformity   Heart:    Regular rate and rhythm, S1 and S2 normal, no murmur, rub   or gallop   Abdomen:     Soft, non-tender, bowel sounds active all four quadrants,     no masses, no organomegaly   Genitalia:    Normal male without lesion, discharge or tenderness   Rectal:    Normal tone, normal prostate, no masses or tenderness;    guaiac negative stool   Extremities:   Extremities normal, atraumatic, no cyanosis or edema   Pulses:   2+ and symmetric all extremities   Skin:   Skin color, texture, turgor normal, no rashes or lesions   Lymph nodes:   Cervical, supraclavicular, and axillary nodes normal   Neurologic:   CNII-XII intact.  Normal strength, sensation and reflexes       throughout       Disposition: home    In process/preliminary results:  Outstanding Order Results     Date and Time Order Name Status Description    2/12/2021 1936 Culture, Blood 2 Preliminary     2/12/2021 1936 Culture, Blood 1 Preliminary     2/12/2021 1936 PSA, total and free In process     2/11/2021 1736 PSA, total and free In process           Patient Instructions:   Current Discharge Medication List      START taking these medications    Details   ciprofloxacin (CIPRO) 500 MG tablet Take 1 tablet by mouth 2 times daily for 14 days  Qty: 28 tablet, Refills: 0         CONTINUE these medications which have NOT CHANGED    Details   NIFEdipine (PROCARDIA XL) 90 MG extended release tablet TAKE ONE TABLET BY MOUTH DAILY  Qty: 30 tablet, Refills: 4    Associated Diagnoses: Essential hypertension      losartan-hydroCHLOROthiazide (HYZAAR) 100-25 MG per tablet TAKE ONE TABLET BY MOUTH DAILY  Qty: 30 tablet, Refills: 5    Associated Diagnoses: Essential hypertension      pravastatin (PRAVACHOL) 20 MG tablet Take 1 tablet by mouth daily  Qty: 90 tablet, Refills: 1      omeprazole (PRILOSEC) 40 MG delayed release capsule TAKE ONE CAPSULE BY MOUTH EVERY MORNING BEFORE BREAKFAST AS NEEDED (REFLUX)  Qty: 30 capsule, Refills: 5    Associated Diagnoses: Dysphagia, unspecified type      Multiple Vitamins-Minerals (THERAPEUTIC MULTIVITAMIN-MINERALS) tablet Take 1 tablet by mouth daily           Activity: activity as tolerated  Diet: regular diet  Wound Care: none needed    Follow-up with pcp in 2 weeks.     Signed:  Sultana Goode MD  Time spent > 35 minutes  2/14/2021  1:22 PM

## 2021-02-14 NOTE — PROGRESS NOTES
Data- discharge order received, pt verbalized agreement to discharge, disposition to previous residence, no needs for HHC/DME. Action- discharge instructions prepared and given to patient, pt verbalized understanding. Medication information packet given r/t NEW and/or CHANGED prescriptions emphasizing name/purpose/side effects, pt verbalized understanding. Discharge instruction summary: Diet- renal, Activity- as tolerated, Primary Care Physician as follows: Torres Santiago -[phone] f/u appointment, prescription medications filled [facility] [address]. Inpatient surgical procedure precautions reviewed:  CHF Education reviewed. Pt/ Family has had a total of 60 minutes CHF education this admission encounter. Response- Pt belongings gathered, IV removed. Disposition is home (no HHC/DME needs), transported with belongings , taken to lobby via w/c w/ staff, no complications.

## 2021-02-14 NOTE — PLAN OF CARE
Problem: Breathing Pattern - Ineffective:  Goal: Ability to achieve and maintain a regular respiratory rate will improve  2/14/2021 1021 by Miki Peace RN  Outcome: Ongoing     Problem: Respiratory  Goal: No pulmonary complications  3/89/9137 1021 by Miki Peace RN  Outcome: Ongoing     Problem:   Goal: Adequate urinary output  Outcome: Ongoing     Problem: Skin Integrity/Risk  Goal: No skin breakdown during hospitalization  Outcome: Ongoing

## 2021-02-15 ENCOUNTER — TELEPHONE (OUTPATIENT)
Dept: INTERNAL MEDICINE CLINIC | Age: 56
End: 2021-02-15

## 2021-02-15 ENCOUNTER — CARE COORDINATION (OUTPATIENT)
Dept: CASE MANAGEMENT | Age: 56
End: 2021-02-15

## 2021-02-15 NOTE — TELEPHONE ENCOUNTER
Rob [facility] Transitions Initial Follow Up Call    Outreach made within 2 business days of discharge: Yes    Patient: Hiral Arana Patient : 1965   MRN: [id number]  Reason for Admission: There are no discharge diagnoses documented for the most recent discharge.   Discharge Date: 21       Spoke with: Left message on voicemail for return call    Discharge department/facility: [facility]      Scheduled appointment with PCP within 7-14 days    Follow Up  Future Appointments   Date Time Provider Ann Kelly   2021  1:00 PM Rambo Bernal MD [place], [place]

## 2021-02-15 NOTE — TELEPHONE ENCOUNTER
TRANSITIONAL CARE    Patient scheduled a HFU for this coming Friday.   He was discharged 02- from [address]: Acute Kidney Injury

## 2021-02-16 ENCOUNTER — CARE COORDINATION (OUTPATIENT)
Dept: CASE MANAGEMENT | Age: 56
End: 2021-02-16

## 2021-02-16 LAB
BLOOD CULTURE, ROUTINE: NORMAL
CULTURE, BLOOD 2: NORMAL

## 2021-02-17 NOTE — ADT AUTH CERT
Renal Failure, Acute - Care Day 4 (2/13/2021) by Santana Mane RN       Review Status Review Entered   Completed 2/17/2021 14:36      Criteria Review      Care Day: 4 Care Date: 2/13/2021 Level of Care: Inpatient Floor    Guideline Day 3    Clinical Status    (X) * Mental status at baseline or improved    2/17/2021 2:36 PM EST by Misti Steele      A&O    (X) * Respiratory status at baseline or improved    2/17/2021 2:36 PM EST by Misti Steele      Resp 16  Oxygen absent    Routes    (X) * Oral hydration, medications, and diet    2/17/2021 2:36 PM EST by Misti Steele      po meds  carb control diet    Interventions    (X) Monitor electrolytes, renal function tests, acid-base, and volume status    2/17/2021 2:36 PM EST by Misti Steele      monitored    Medications    (X) Possible medical therapies    2/17/2021 2:36 PM EST by Hortencia Goldberg ongoing    * Milestone   Additional Notes   2/13/21:  NEPHROLOGY NOTE;   RECOMMENDATIONS:        - post ATN - polyuria noted - worsening to 6L                -so keep IVF w/  cc/hr (~ 1 cc/kg/hr) for 1 more day                -Drink when feel thirsty, avoid overhydration,        - BP is better,   - From home med-continue holding Hyzaar   - Stoped nifedipine - ok allow slightly higher BP for short term        -antibiotics for Pylo., w/ Cefepime - as per primary team       + ok to replete K per PRN protocol   + order K- Phos PO    + Mag - continue monitoring - not needing repletion        -fleming needed : W/ h/o BPH (noted on CT also)                - did Voiding trial - doing well                -Discussed outpatient work-up and follow-up , might consider urology follow-up as outpatient also               -If BP allows, add Flomax **       D/C plan from renal stand point:    - might need 1 more day due to polyuria - at risk for dehydration,            IMPRESSION:          SILVIA (on proteinuric CKD-2): Severe on admission continues to improve    - non-Oligouric -> Polyuria    - BL Scr- 0.9-1.0 (last in 10/2020)  ---> 3.8 on admission    : Etiology of SILVIA - presumed ATN + prerenal, due to pyelonephritis, bilateral + UTI + Hyzaar                - other differentials: unlikely due to improvement. - UA : Large blood, only few RBCs, but multiple white cells, leukocytes, few hyaline casts,               -Suggestive of prerenal/UTI more likely.                - checked CK w/ large blood, but only 25 RBCs on UA, so ruled out rhabdo w/ CK WNL    -Renal imaging: w/ CT scan reviewed:     -urine lytes = might be suggestive of prerenal + ATN   -Covid negative         Associated problems:    - Azotemia: Prerenal, follow with IV fluids,.  Unlikely upper GI bleed or steroid induced - as improved.        - Electrolytes:    : K: Hypokalemia-needing repletion, likely due to polyuria + previous diuretics use,   : Magnesium - slightly higher, monitor, as trending down to normal    : Hypophosphatemia- likely due to polyuria + decreased in diet -needing repletion and monitoring       - Volume status: Mild hypo - volemic - better w/ IVFs   : Na: Hyponatremia, moderate -due to SILVIA + HCTZ (in Hyzaar) - better    : Hypertension history- but BP on a Lower side,       - Acid-Base: normal.    - Anemia: Mild           Other problems: Management per primary and other consulting teams.        Sepsis, likely UTI, Pyelo. Diabetes type 2, last A1c controlled, less than 7,    -Diet control only,   History of hypertension       Hospital Problems      Last Modified POA     SILVIA (acute kidney injury) ([facility] [address] 2/10/2021 Yes           Please refer to the orders. High Complexity. Multiple complex problems. Discussed with patient, patient's wife - hamilton ,  treatment team -hospitalist   Thank you for allowing me to participate in this patient's care. Please do not hesitate to contact me with any questions/concerns.  We will follow along with you.    Praveena Domingo MD   [facility] [address] [place], [place] [address] Office: [phone] or [employer]   Fax: [phone]               ========================================================    ========================================================        Subjective:    Patient was seen at bedside   His wife is Kera Hernandez at bedside today       Seen resting in bed   No acute complaints,   Reported feeling better overall   Patient is attempting to drink more, with more urine output noted   PHYSICAL EXAM:   Recent vital signs and recent I/Os reviewed by me.        Wt Readings from Last 3 Encounters:   02/13/21 218 lb 8 oz (99.1 kg)   12/03/20 234 lb (106.1 kg)   08/31/20 237 lb (107.5 kg)       BP Readings from Last 3 Encounters:   02/13/21 125/74   12/03/20 126/76   08/31/20 (!) 142/90       Patient Vitals for the past 24 hrs:     BP Temp Temp src Pulse Resp SpO2 Weight   02/13/21 0859 - - - - 16 98 % -   02/13/21 0827 - - - - - - 218 lb 8 oz (99.1 kg)   02/13/21 0815 125/74 98.7 °F (37.1 °C) Oral 91 16 96 % -   02/13/21 0503 119/78 99.5 °F (37.5 °C) Oral 95 16 95 % -   02/13/21 0040 (!) 146/55 98.9 °F (37.2 °C) Oral 102 16 95 % -   02/12/21 2300 - - - - - 95 % -   02/12/21 2150 - - - - - 94 % -   02/12/21 2045 - - - - - 95 % -   02/12/21 2035 116/70 99.7 °F (37.6 °C) Oral 100 14 95 % -   02/12/21 2023 - - - - 18 96 % -   02/12/21 1615 126/76 - - 94 18 95 % -   02/12/21 1100 113/64 98.6 °F (37 °C) Oral 98 18 94 % -           Intake/Output Summary (Last 24 hours) at 2/13/2021 6734   Last data filed at 2/13/2021 7645   Gross per 24 hour   Intake 4444 ml   Output 5605 ml   Net -1161 ml               Physical exam:     General: Awake, Alert, communicative   No acute distress   Neck: Supple, JVD not visible.     CVS:  Heart sounds are normal. No loud murmur.     RS: Normal respiratory effort, Breat sound: Normal   Abd: Soft , bowel sounds are normal   CNS: Awake Oriented , moving all extremities    Extremities/MSK: trace Edema, no cyanosis.             DATA:   Diagnostic tests prominent on the left and extends inferiorly along the psoas muscles with no focal fluid collection or mass. This could represent inflammatory changes from bilateral pyelonephritis or possible perirenal scarring.  No hydronephrosis or urinary obstruction is seen.  Recommend clinical follow-up. Mild subsegmental atelectasis or early infiltrates along the lung bases. Small hiatal hernia. Mild chronic liver changes with fatty replacement throughout Small air-fluid levels throughout the bowel which could represent early ileus.  Recommend follow-up. Mild constipation. Mild prostatic enlargement.            Xr Chest Portable       Result Date: 2/10/2021   EXAMINATION: ONE XRAY VIEW OF THE CHEST 2/10/2021 7:19 pm COMPARISON: None. HISTORY: ORDERING SYSTEM PROVIDED HISTORY: cough, fever TECHNOLOGIST PROVIDED HISTORY: Reason for exam:->cough, fever Reason for Exam: Increase in needing to use the bathroom, urine with with an odor. Chills and feverr, fatigue Acuity: Unknown Type of Exam: Initial FINDINGS: The patient is rotated. The cardiac silhouette, mediastinal and hilar contours are normal.  Right hemidiaphragm is slightly elevated.  The lungs are clear. There are no pleural effusions. No acute osseous abnormalities are identified.        No acute cardiopulmonary disease.        HOSPITALIST NOTE;   Chief Complaint: Fever and dysuria       Hospital Course: Admitted with fever and dysuria.  Found to have bilateral pyelonephritis and bacteremia.  Started on cefepime.  Has acute renal failure upon admission.  Nephrology consulted.  Nephrotoxins held.  Started on IV fluids.  Creatinine slowly improving.  Patient also has some cough and congestion for last few days.  COVID-19 test came back negative.  Chest x-ray clear.  Richey removed today.  Has hypokalemia and potassium being replaced as well.       Subjective:     Doing better overall. Not much dysuria. Some pain in the bilateral flank areas.  No chest pain or shortness of breath. On RA. Intake/Output Summary (Last 24 hours) at 2/13/2021 1359   Last data filed at 2/13/2021 1009   Gross per 24 hour   Intake 4684 ml   Output 6175 ml   Net -1491 ml           Physical Exam Performed:       /74   Pulse 91   Temp 98.7 °F (37.1 °C) (Oral)   Resp 16   Ht 6' 2.25\" (1.886 m)   Wt 218 lb 8 oz (99.1 kg)   SpO2 97%   BMI 27.87 kg/m²        General appearance: No apparent distress, appears stated age and cooperative. HEENT: Pupils equal, round, and reactive to light. Conjunctivae/corneas clear. Neck: Supple, with full range of motion. No jugular venous distention. Trachea midline. Respiratory:  Normal respiratory effort. Clear to auscultation, bilaterally without Rales/Wheezes/Rhonchi. Cardiovascular: Regular rate and rhythm with normal S1/S2 without murmurs, rubs or gallops. Abdomen: Soft, mildly tender bilaterally in the flanks, non-distended with normal bowel sounds. Musculoskeletal: No clubbing, cyanosis or edema bilaterally.  Full range of motion without deformity. Skin: Skin color, texture, turgor normal.  No rashes or lesions. Neurologic:  Neurovascularly intact without any focal sensory/motor deficits.  Cranial nerves: II-XII intact, grossly non-focal.   Psychiatric: Alert and oriented, thought content appropriate, normal insight   Capillary Refill: Brisk,< 3 seconds    Peripheral Pulses: +2 palpable, equal bilaterally            Labs:    Recent Labs     02/11/21   0416 02/12/21   0546 02/13/21   0448   WBC 20.4* 15.1* 12.6*   HGB 11.6* 11.9* 11.3*   HCT 35.6* 36.9* 34.7*    404 439       Recent Labs     02/11/21   0416 02/12/21   0546 02/12/21   1747 02/13/21   0255 02/13/21   0448   * 136 -- -- 140   K 3.0* 2.9* 3.2* 3.3* 3.4*   CL 97* 100 -- -- 106   CO2 24 26 -- -- 25   BUN 44* 28* -- -- 17   CREATININE 2.8* 1.5* -- -- 1.1   CALCIUM 8.4 8.3 -- -- 8.6   PHOS 3.3 2.0* -- -- 1.4*       Recent Labs     02/10/21   1923   AST 22   ALT 24   BILITOT 0. 6   ALKPHOS 62       No results for input(s): INR in the last 72 hours. Recent Labs     02/11/21   0415   CKTOTAL 154           Urinalysis:        Lab Results   Component Value Date     NITRU Negative 02/10/2021     WBCUA >900 02/10/2021     BACTERIA 4+ 02/10/2021     RBCUA 25 02/10/2021     BLOODU LARGE 02/10/2021     SPECGRAV 1.014 02/10/2021     GLUCOSEU Negative 02/10/2021           Radiology:   CT ABDOMEN PELVIS WO CONTRAST Additional Contrast? None   Final Result   Moderate perirenal stranding around both kidneys mild stranding and minimal   fluid in the pararenal space which is more prominent on the left and extends   inferiorly along the psoas muscles with no focal fluid collection or mass. This could represent inflammatory changes from bilateral pyelonephritis or   possible perirenal scarring.  No hydronephrosis or urinary obstruction is   seen.  Recommend clinical follow-up.       Mild subsegmental atelectasis or early infiltrates along the lung bases.       Small hiatal hernia.       Mild chronic liver changes with fatty replacement throughout       Small air-fluid levels throughout the bowel which could represent early   ileus.  Recommend follow-up.       Mild constipation.       Mild prostatic enlargement.           XR CHEST PORTABLE   Final Result   No acute cardiopulmonary disease.                   Assessment/Plan:       Active Hospital Problems     Diagnosis   · SILVIA (acute kidney injury) ([facility] [address] [N17.9]           Acute kidney injury: Secondary to pyelonephritis and Hyzaar.  Holding nephrotoxic's.  On IV fluids per nephrology.  Creatinine slowly trending down   Continue IV fluid hydration for another day.  Continue strict I's and O's.       Bilateral pyelonephritis: Urine cultures grew E. coli.  Bacteremia noted as well.  On cefepime.  Continue antibiotics.   Repeat blood cultures.        Sepsis present on admission: Presented with fever, tachycardia, leukocytosis, tachypnea.  Secondary UTI.    Improving overall.       Cough/hypoxia: Suspect secondary atelectasis.  Chest x-ray clear.  COVID-19 negative.  Increased mobility.  Incentive spirometry. On RA.        Hypertension: Procardia.  Stopped Hyzaar.       Diabetes mellitus type 2: A1c 6.7.  Sliding scale insulin here.       Hyperlipidemia: Statin       Prostatic enlargement: Noted on CT. PSA pending.        DVT Prophylaxis: Heparin   Diet: DIET CARB CONTROL;  Renal   Code Status: Full Code       PT/OT Eval Status: Not needed       Dispo - inpatient 1 to 2 days       Jay Pedroza MD      ACTIVE MEDS; · sodium chloride 100 mL/hr at 02/13/21 0950   · dextrose           Scheduled Medications    Scheduled MedicationsExpand by Default   · potassium phosphate (monobasic) 500 mg Oral TID WC   · pantoprazole 40 mg Intravenous QAM AC   · cefepime 2,000 mg Intravenous Q12H   · ipratropium-albuterol 1 ampule Inhalation Q4H WA   · sodium chloride flush 10 mL Intravenous 2 times per day   · pravastatin 20 mg Oral Daily   · heparin (porcine) 5,000 Units Subcutaneous 3 times per day   · lactobacillus 1 capsule Oral BID WC   · insulin lispro 0-6 Units Subcutaneous TID WC   · insulin lispro 0-3 Units Subcutaneous Nightly   Tessalon 100mg po prn x1                  Renal Failure, Acute - Care Day 3 (2/12/2021) by Prince Lucia RN       Review Status Review Entered   Completed 2/17/2021 08:27      Criteria Review      Care Day: 3 Care Date: 2/12/2021 Level of Care: Inpatient Floor    Guideline Day 2    Level Of Care    (X) Floor    Clinical Status    ( ) * Electrolyte abnormalities absent or improved    ( ) * Acid-base abnormalities absent or improved    (X) * Hypotension absent    Activity    (X) Activity as tolerated    2/17/2021 8:27 AM EST by Hemanth Strickland      up w/ assist    Routes    (X) Parenteral or oral hydration    2/17/2021 8:27 AM EST by Hemanth Strickland      0.9 % sodium chloride infusion   Rate: 100 mL/hr    (X) Parenteral or oral medications 2/17/2021 8:27 AM EST by Aubrie Christine      meds noted below    (X) Renal diet as tolerated    2/17/2021 8:27 AM EST by Aubrie Mckinney      carb controlled, Renal    Interventions    (X) Possible dialysis    (X) Possible renal biopsy    (X) Monitor electrolytes, renal function tests, acid-base, and volume status    Medications    (X) Possible medical therapies    * Milestone   Additional Notes   2/12      Neph   RECOMMENDATIONS:    -keep IVF w/  cc/hr (~ 1 cc/kg/hr)    -From home med-holding Hyzaar, Prilosec   -stoped nifedipine -Allow slightly higher BP -        -Empirical antibiotics for Pylo., w/ Cefepime                -Follow urine culture       + K is being repleted per PRN protocol   + Mag - monitoring (2.7 now) - not needing repletion        -Keep fleming insertion need W/ h/o BPH (noted on CT also               **Voiding trial in 24 hours       D/C plan from renal stand point:   - f/u course - might need 2-3 days    -: will f/u w/ me at [address] in 1 week after d/c. (I will arrange.)    --------------------------   IM   Doing better overall.  Not much dysuria.  Some pain in the bilateral flank areas.  T-max 101.2 last night at 2215.  No shortness of breath.  Has some cough.  On 2 L oxygen currently. /76   Pulse 94   Temp 98.6 °F (37 °C) (Oral)   Resp 18   Ht 6' 2.25\" (1.886 m)   Wt 227 lb 9.6 oz (103.2 kg)   SpO2 95%   BMI 29.03 kg/m²        General appearance: No apparent distress, appears stated age and cooperative. HEENT: Pupils equal, round, and reactive to light. Conjunctivae/corneas clear. Neck: Supple, with full range of motion. No jugular venous distention. Trachea midline. Respiratory:  Normal respiratory effort. Clear to auscultation, bilaterally without Rales/Wheezes/Rhonchi. Cardiovascular: Regular rate and rhythm with normal S1/S2 without murmurs, rubs or gallops.    Abdomen: Soft, mildly tender bilaterally in the flanks, non-distended with normal bowel sounds. Musculoskeletal: No clubbing, cyanosis or edema bilaterally.  Full range of motion without deformity. Skin: Skin color, texture, turgor normal.  No rashes or lesions. Neurologic:  Neurovascularly intact without any focal sensory/motor deficits. Cranial nerves: II-XII intact, grossly non-focal.   Psychiatric: Alert and oriented, thought content appropriate, normal insight   Capillary Refill: Brisk,< 3 seconds    Peripheral Pulses: +2 palpable, equal bilaterally       Assessment/Plan:       Active Hospital Problems     Diagnosis   · SILVIA (acute kidney injury) ([facility] [address] [N17.9]           Acute kidney injury: Secondary to pyelonephritis and Hyzaar.  Holding nephrotoxic's.  On IV fluids per nephrology.  Creatinine slowly trending down    Continue IV fluid hydration.  Discontinue Richey catheter.  Continue strict I's and O's.       Bilateral pyelonephritis: Urine cultures grew E. coli.  Bacteremia noted as well.  On cefepime.  Temperature spikes improving.  Continue antibiotics.  Repeat blood cultures        Sepsis present on admission: Presented with fever, tachycardia, leukocytosis, tachypnea.  Secondary UTI.    Improving overall.           Cough/hypoxia: Suspect secondary atelectasis.  Chest x-ray clear.  COVID-19 negative.  Increased mobility.  Incentive spirometry.       Hypertension: Procardia.  Stopped Hyzaar.       Diabetes mellitus type 2: A1c 6.7.  Sliding scale insulin here.       Hyperlipidemia: Statin       Prostatic enlargement: Noted on CT. PSA pending.       DVT Prophylaxis: Heparin   Diet: DIET CARB CONTROL;  Renal   Code Status: Full Code       PT/OT Eval Status: Not needed       Dispo - inpatient 1 to 2 days   -----------------------      2/12/2021 05:46   Sodium: 136   Potassium: 2.9 (LL)   Chloride: 100   CO2: 26   BUN: 28 (H)   Creatinine: 1.5 (H)   Anion Gap: 10   GFR Non-: 49 (A)   GFR : 59 (A)   Magnesium: 2.70 (H)   Glucose: 130 (H)   Calcium: 8.3   Phosphorus: 2.0 (L)   Albumin: 3.2 (L)   WBC: 15.1 (H)   RBC: 4.26   Hemoglobin Quant: 11.9 (L)   Hematocrit: 36.9 (L)   MCV: 86.6   MCH: 28.0   MCHC: 32.3   MPV: 8.1   RDW: 14.1   Platelet Count: 775      2/12/2021 07:02   POC Glucose: 112 (H)      2/12/2021 11:24   POC Glucose: 119 (H)      2/12/2021 16:25   POC Glucose: 108 (H)      2/12/2021 17:47   Potassium: 3.2 (L)      2/12/2021 19:36   CULTURE, BLOOD 1: Rpt   CULTURE, BLOOD 2: Rpt   Culture, Blood 2: No Growth after 4 days of incubation.       2/12/2021 20:46   POC Glucose: 137 (H)   ---------------------------------      Scheduled Medications   · cefepime 2,000 mg Intravenous Q12H   · ipratropium-albuterol 1 ampule Inhalation Q4H WA   · sodium chloride flush 10 mL Intravenous 2 times per day   · pravastatin 20 mg Oral Daily   · heparin (porcine) 5,000 Units Subcutaneous 3 times per day   · lactobacillus 1 capsule Oral BID WC   · insulin lispro 0-6 Units Subcutaneous TID WC   · insulin lispro 0-3 Units Subcutaneous Nightly         0.9 % sodium chloride infusion    Rate: 100 mL/hr       PRN   potassium chloride 10 mEq/100 mL IVPB (Peripheral Line) x1

## 2021-02-18 ENCOUNTER — TELEPHONE (OUTPATIENT)
Dept: INTERNAL MEDICINE CLINIC | Age: 56
End: 2021-02-18

## 2021-02-18 LAB
PROSTATE SPECIFIC ANTIGEN FREE: 0.1 UG/L
PROSTATE SPECIFIC ANTIGEN FREE: 0.1 UG/L
PROSTATE SPECIFIC ANTIGEN PERCENT FREE: 5 %
PROSTATE SPECIFIC ANTIGEN PERCENT FREE: 6.3 %
PROSTATE SPECIFIC ANTIGEN: 1.6 UG/L (ref 0–4)
PROSTATE SPECIFIC ANTIGEN: 2 UG/L (ref 0–4)

## 2021-02-18 NOTE — TELEPHONE ENCOUNTER
[facility] Transitions Initial Follow Up Call    Outreach made within 2 business days of discharge: Yes    Patient: Estrella Nguyen Patient : 1965   MRN: [id number]  Reason for Admission: There are no discharge diagnoses documented for the most recent discharge.   Discharge Date: 21        Discharge department/facility: [facility]    Follow Up  Future Appointments   Date Time Provider Ann Kelly   2021  1:00 PM Kailee Caldwell MD [facility]   3/8/2021 12:00 PM MD RAHEEM Gabriel MA

## 2021-02-24 RX ORDER — PRAVASTATIN SODIUM 20 MG
TABLET ORAL
Qty: 90 TABLET | Refills: 1 | Status: SHIPPED | OUTPATIENT
Start: 2021-02-24 | End: 2021-08-24

## 2021-02-24 NOTE — TELEPHONE ENCOUNTER
Last appointment: 2/19/2021  Next appointment: Visit date not found  Last refill: 08/31/2020 # 80 with one refill

## 2021-03-01 DIAGNOSIS — D63.1 ANEMIA IN CHRONIC KIDNEY DISEASE, UNSPECIFIED CKD STAGE: ICD-10-CM

## 2021-03-01 DIAGNOSIS — N18.9 ANEMIA IN CHRONIC KIDNEY DISEASE, UNSPECIFIED CKD STAGE: ICD-10-CM

## 2021-03-01 LAB
ALBUMIN SERPL-MCNC: 4.4 G/DL (ref 3.4–5)
ANION GAP SERPL CALCULATED.3IONS-SCNC: 9 MMOL/L (ref 3–16)
BUN BLDV-MCNC: 16 MG/DL (ref 7–20)
CALCIUM SERPL-MCNC: 10 MG/DL (ref 8.3–10.6)
CHLORIDE BLD-SCNC: 95 MMOL/L (ref 99–110)
CO2: 32 MMOL/L (ref 21–32)
CREAT SERPL-MCNC: 1.1 MG/DL (ref 0.9–1.3)
CREATININE URINE: 109.9 MG/DL (ref 39–259)
GFR AFRICAN AMERICAN: >60
GFR NON-AFRICAN AMERICAN: >60
GLUCOSE BLD-MCNC: 105 MG/DL (ref 70–99)
HCT VFR BLD CALC: 38 % (ref 40.5–52.5)
HEMOGLOBIN: 12.7 G/DL (ref 13.5–17.5)
MCH RBC QN AUTO: 28.7 PG (ref 26–34)
MCHC RBC AUTO-ENTMCNC: 33.5 G/DL (ref 31–36)
MCV RBC AUTO: 85.7 FL (ref 80–100)
MICROALBUMIN UR-MCNC: <1.2 MG/DL
MICROALBUMIN/CREAT UR-RTO: NORMAL MG/G (ref 0–30)
PDW BLD-RTO: 14 % (ref 12.4–15.4)
PHOSPHORUS: 3.6 MG/DL (ref 2.5–4.9)
PLATELET # BLD: 536 K/UL (ref 135–450)
PMV BLD AUTO: 7.8 FL (ref 5–10.5)
POTASSIUM SERPL-SCNC: 3.6 MMOL/L (ref 3.5–5.1)
PROTEIN PROTEIN: 9 MG/DL
PROTEIN/CREAT RATIO: 0.1 MG/DL
RBC # BLD: 4.43 M/UL (ref 4.2–5.9)
SODIUM BLD-SCNC: 136 MMOL/L (ref 136–145)
WBC # BLD: 5.9 K/UL (ref 4–11)

## 2021-03-28 DIAGNOSIS — I10 ESSENTIAL HYPERTENSION: ICD-10-CM

## 2021-03-29 RX ORDER — LOSARTAN POTASSIUM AND HYDROCHLOROTHIAZIDE 25; 100 MG/1; MG/1
TABLET ORAL
Qty: 90 TABLET | Refills: 4 | Status: SHIPPED | OUTPATIENT
Start: 2021-03-29 | End: 2022-06-24

## 2021-03-29 NOTE — TELEPHONE ENCOUNTER
Last appointment: 2/19/2021  Next appointment: Visit date not found  Last refill: 9/29/20    Please advise as DOD  Thank you

## 2021-05-20 ENCOUNTER — TELEPHONE (OUTPATIENT)
Dept: INTERNAL MEDICINE CLINIC | Age: 56
End: 2021-05-20

## 2021-05-20 ENCOUNTER — OFFICE VISIT (OUTPATIENT)
Dept: INTERNAL MEDICINE CLINIC | Age: 56
End: 2021-05-20
Payer: COMMERCIAL

## 2021-05-20 VITALS
TEMPERATURE: 98.4 F | OXYGEN SATURATION: 98 % | SYSTOLIC BLOOD PRESSURE: 116 MMHG | BODY MASS INDEX: 27.09 KG/M2 | HEART RATE: 88 BPM | RESPIRATION RATE: 16 BRPM | WEIGHT: 211 LBS | DIASTOLIC BLOOD PRESSURE: 78 MMHG

## 2021-05-20 DIAGNOSIS — R30.0 DYSURIA: ICD-10-CM

## 2021-05-20 DIAGNOSIS — R31.9 HEMATURIA, UNSPECIFIED TYPE: Primary | ICD-10-CM

## 2021-05-20 LAB
BILIRUBIN, POC: NORMAL
BLOOD URINE, POC: NORMAL
CLARITY, POC: CLEAR
COLOR, POC: YELLOW
GLUCOSE URINE, POC: NORMAL
KETONES, POC: NORMAL
LEUKOCYTE EST, POC: NORMAL
NITRITE, POC: NORMAL
PH, POC: 6
PROTEIN, POC: NORMAL
SPECIFIC GRAVITY, POC: 1.02
UROBILINOGEN, POC: 0.2

## 2021-05-20 PROCEDURE — 99213 OFFICE O/P EST LOW 20 MIN: CPT | Performed by: INTERNAL MEDICINE

## 2021-05-20 PROCEDURE — 81002 URINALYSIS NONAUTO W/O SCOPE: CPT | Performed by: INTERNAL MEDICINE

## 2021-05-20 RX ORDER — OMEPRAZOLE 40 MG/1
CAPSULE, DELAYED RELEASE ORAL
Qty: 30 CAPSULE | Refills: 5 | Status: SHIPPED | OUTPATIENT
Start: 2021-05-20 | End: 2022-05-18

## 2021-05-20 NOTE — PROGRESS NOTES
Waqas Ha (:  1965) is a 50 y.o. male, here for evaluation of the following chief complaint(s): Other (Episode this morning of passing blood clot thru penis, slight pain. No recurrence of episode. No fever or chills,)      ASSESSMENT/PLAN:  1. Hematuria, unspecified type   Encourage fluids. We will also send urine for culture. Advised CT urogram to further evaluate. We will try to obtain notes from his cystoscopy at urology group. Consider hemorrhagic prostatitis. -     POCT Urinalysis no Micro  -     CT UROGRAM; Future  2. Dysuria  -     Culture, Urine      Return if symptoms worsen or fail to improve. SUBJECTIVE/OBJECTIVE:  HPI   Patient states he had an episode this morning of passing blood clots through the penis which was associated with slight pain. He is currently asymptomatic. He denies fever or chills. He called right away because of a hospital stay in February for pyelonephritis. Urine dip here shows blood but no infection cells. He denies mid back pain or lower abdomen pain. He is not taking any blood thinners.         Past Medical History:   Diagnosis Date    Acute renal failure (ARF) ([address] 2021    hematuria, sepsis, hosp stay    Arthritis     right knee    Back problem     Benign prostatic hyperplasia with urinary obstruction     [facility]    Bronchitis     Diabetes mellitus type 2, controlled ([address]     Dysphagia     better after  egd    Fatty liver     Hypertension        Current Outpatient Medications   Medication Sig Dispense Refill    omeprazole (PRILOSEC) 40 MG delayed release capsule TAKE ONE CAPSULE BY MOUTH EVERY MORNING BEFORE BREAKFAST AS NEEDED (REFLUX) 30 capsule 5    losartan-hydroCHLOROthiazide (HYZAAR) 100-25 MG per tablet TAKE ONE TABLET BY MOUTH DAILY 90 tablet 4    pravastatin (PRAVACHOL) 20 MG tablet TAKE ONE TABLET BY MOUTH DAILY 90 tablet 1    tamsulosin (FLOMAX) 0.4 MG capsule Take 0.4 mg by mouth daily      An electronic signature was used to authenticate this note.     --Wiliam Mchugh MD

## 2021-05-20 NOTE — TELEPHONE ENCOUNTER
----- Message from Naty Real sent at 5/20/2021 12:35 PM EDT -----  Subject: Appointment Request    Reason for Call: Urgent Adult Urinary Problem    QUESTIONS  Type of Appointment? Established Patient  Reason for appointment request? Available appointments did not meet   patient need  Additional Information for Provider? PT has blood in urine, urgent appt   needed. ---------------------------------------------------------------------------  --------------  Tami ANDERSON  What is the best way for the office to contact you? OK to leave message on   voicemail  Preferred Call Back Phone Number? [phone]  ---------------------------------------------------------------------------  --------------  SCRIPT ANSWERS  Relationship to Patient? Self  Appointment reason? Symptomatic  Select script based on patient symptoms? Adult Urinary Symptoms   [Urine-related, UTI, Bladder Infection]   Are you having back pain with your urinary symptoms? No  Are you having vomiting or nausea? No  Are you having fevers (100.4), chills, or sweats? No  Are you having increased thirst? No  Is there blood in your urine? Yes  Have you been diagnosed with, tested for, or told that you are suspected   of having COVID-19 (Coronavirus)? No  Have you had a fever or taken medication to treat a fever within the past   3 days? No  Have you had a cough, shortness of breath or flu-like symptoms within the   past 3 days? No  Do you currently have flu-like symptoms including fever or chills, cough,   shortness of breath, or difficulty breathing, or new loss of taste or   smell? No  (Service Expert  click yes below to proceed with [employer] As Usual   Scheduling)?  Yes

## 2021-05-20 NOTE — TELEPHONE ENCOUNTER
See call center notes:  Patient is 50 yr old male requesting an appointment for possible UTI. Dr. James Mcmanus a 15 minute slot available at 2:45. Ok to schedule?

## 2021-05-22 LAB — URINE CULTURE, ROUTINE: NORMAL

## 2021-06-08 ENCOUNTER — HOSPITAL ENCOUNTER (OUTPATIENT)
Dept: CT IMAGING | Age: 56
Discharge: HOME | End: 2021-06-08
Payer: COMMERCIAL

## 2021-06-08 DIAGNOSIS — R31.9 HEMATURIA, UNSPECIFIED TYPE: ICD-10-CM

## 2021-06-08 PROCEDURE — 74178 CT ABD&PLV WO CNTR FLWD CNTR: CPT

## 2021-06-08 PROCEDURE — 6360000004 HC RX CONTRAST MEDICATION: Performed by: UROLOGY

## 2021-06-08 RX ADMIN — IOPAMIDOL 120 ML: 755 INJECTION, SOLUTION INTRAVENOUS at 06:51

## 2021-06-09 DIAGNOSIS — N17.9 AKI (ACUTE KIDNEY INJURY) (HCC): ICD-10-CM

## 2021-06-09 DIAGNOSIS — I10 ESSENTIAL HYPERTENSION: ICD-10-CM

## 2021-06-09 LAB
ALBUMIN SERPL-MCNC: 5 G/DL (ref 3.4–5)
ANION GAP SERPL CALCULATED.3IONS-SCNC: 17 MMOL/L (ref 3–16)
BUN BLDV-MCNC: 18 MG/DL (ref 7–20)
CALCIUM SERPL-MCNC: 10 MG/DL (ref 8.3–10.6)
CHLORIDE BLD-SCNC: 98 MMOL/L (ref 99–110)
CO2: 25 MMOL/L (ref 21–32)
CREAT SERPL-MCNC: 1 MG/DL (ref 0.9–1.3)
GFR AFRICAN AMERICAN: >60
GFR NON-AFRICAN AMERICAN: >60
GLUCOSE BLD-MCNC: 96 MG/DL (ref 70–99)
HCT VFR BLD CALC: 39.9 % (ref 40.5–52.5)
HEMOGLOBIN: 13.6 G/DL (ref 13.5–17.5)
MCH RBC QN AUTO: 29.2 PG (ref 26–34)
MCHC RBC AUTO-ENTMCNC: 34.2 G/DL (ref 31–36)
MCV RBC AUTO: 85.5 FL (ref 80–100)
PDW BLD-RTO: 15.1 % (ref 12.4–15.4)
PHOSPHORUS: 3.3 MG/DL (ref 2.5–4.9)
PLATELET # BLD: 368 K/UL (ref 135–450)
PMV BLD AUTO: 7.9 FL (ref 5–10.5)
POTASSIUM SERPL-SCNC: 3.7 MMOL/L (ref 3.5–5.1)
RBC # BLD: 4.67 M/UL (ref 4.2–5.9)
SODIUM BLD-SCNC: 140 MMOL/L (ref 136–145)
WBC # BLD: 6.9 K/UL (ref 4–11)

## 2021-06-10 LAB
CREATININE URINE: 171.4 MG/DL (ref 39–259)
PROTEIN PROTEIN: 15 MG/DL
PROTEIN/CREAT RATIO: 0.1 MG/DL

## 2021-06-29 DIAGNOSIS — I10 ESSENTIAL HYPERTENSION: ICD-10-CM

## 2021-06-29 RX ORDER — NIFEDIPINE 90 MG/1
TABLET, EXTENDED RELEASE ORAL
Qty: 60 TABLET | Refills: 3 | Status: SHIPPED | OUTPATIENT
Start: 2021-06-29 | End: 2022-03-14

## 2021-11-18 ENCOUNTER — OFFICE VISIT (OUTPATIENT)
Dept: INTERNAL MEDICINE CLINIC | Age: 56
End: 2021-11-18
Payer: COMMERCIAL

## 2021-11-18 VITALS
OXYGEN SATURATION: 97 % | DIASTOLIC BLOOD PRESSURE: 80 MMHG | SYSTOLIC BLOOD PRESSURE: 122 MMHG | BODY MASS INDEX: 27.34 KG/M2 | TEMPERATURE: 97.2 F | WEIGHT: 213 LBS | HEIGHT: 74 IN | HEART RATE: 93 BPM

## 2021-11-18 DIAGNOSIS — I10 ESSENTIAL HYPERTENSION: Primary | ICD-10-CM

## 2021-11-18 DIAGNOSIS — Z11.59 SCREENING FOR VIRAL DISEASE: ICD-10-CM

## 2021-11-18 DIAGNOSIS — R31.9 HEMATURIA, UNSPECIFIED TYPE: ICD-10-CM

## 2021-11-18 DIAGNOSIS — Z12.5 SCREENING FOR PROSTATE CANCER: ICD-10-CM

## 2021-11-18 DIAGNOSIS — K21.9 GASTROESOPHAGEAL REFLUX DISEASE WITHOUT ESOPHAGITIS: ICD-10-CM

## 2021-11-18 DIAGNOSIS — E83.52 HYPERCALCEMIA: ICD-10-CM

## 2021-11-18 DIAGNOSIS — E78.5 HYPERLIPIDEMIA, UNSPECIFIED HYPERLIPIDEMIA TYPE: Primary | ICD-10-CM

## 2021-11-18 DIAGNOSIS — E11.9 TYPE 2 DIABETES MELLITUS WITHOUT COMPLICATION, WITHOUT LONG-TERM CURRENT USE OF INSULIN (HCC): ICD-10-CM

## 2021-11-18 DIAGNOSIS — E78.5 HYPERLIPIDEMIA, UNSPECIFIED HYPERLIPIDEMIA TYPE: ICD-10-CM

## 2021-11-18 LAB
BILIRUBIN URINE: NEGATIVE
BLOOD, URINE: NEGATIVE
CLARITY: CLEAR
COLOR: YELLOW
COMMENT UA: ABNORMAL
CRYSTALS, UA: ABNORMAL /HPF
EPITHELIAL CELLS, UA: 1 /HPF (ref 0–5)
GLUCOSE URINE: NEGATIVE MG/DL
HYALINE CASTS: 2 /LPF (ref 0–8)
KETONES, URINE: NEGATIVE MG/DL
LEUKOCYTE ESTERASE, URINE: NEGATIVE
MICROSCOPIC EXAMINATION: YES
NITRITE, URINE: NEGATIVE
PH UA: 6.5 (ref 5–8)
PROTEIN UA: ABNORMAL MG/DL
RBC UA: 3 /HPF (ref 0–4)
SPECIFIC GRAVITY UA: 1.02 (ref 1–1.03)
URINE TYPE: ABNORMAL
UROBILINOGEN, URINE: 1 E.U./DL
WBC UA: 1 /HPF (ref 0–5)

## 2021-11-18 PROCEDURE — 81003 URINALYSIS AUTO W/O SCOPE: CPT | Performed by: INTERNAL MEDICINE

## 2021-11-18 PROCEDURE — 99214 OFFICE O/P EST MOD 30 MIN: CPT | Performed by: INTERNAL MEDICINE

## 2021-11-18 RX ORDER — ROSUVASTATIN CALCIUM 10 MG/1
10 TABLET, COATED ORAL NIGHTLY
Qty: 30 TABLET | Refills: 3 | Status: SHIPPED | OUTPATIENT
Start: 2021-11-18 | End: 2022-03-16

## 2021-11-18 NOTE — PROGRESS NOTES
Jalil Carver (:  1965) is a 49 y.o. male, here for evaluation of the following chief complaint(s):    Follow-up (htn, prediabetes)      ASSESSMENT/PLAN:  1. Essential hypertension  Well-controlled on nifedipine and losartan HCTZ  -     Comprehensive Metabolic Panel; Future  -     PTH, INTACT; Future  2. Type 2 diabetes mellitus without complication, without long-term current use of insulin (HCC)  Diet controlled  -     Hemoglobin A1C; Future  3. Hyperlipidemia, unspecified hyperlipidemia type  Check labs on pravastatin  -     Lipid Panel; Future  4. Screening for prostate cancer  -     PSA screening; Future  5. Screening for viral disease  -     HEPATITIS C ANTIBODY; Future  6. Hypercalcemia  Check PTH  7. Hematuria, unspecified type  -     URINALYSIS  8. Gastroesophageal reflux disease without esophagitis  Uses rare PPI  -     Magnesium; Future      Return in about 6 months (around 2022). SUBJECTIVE/OBJECTIVE:  HPI   Patient is here for routine visit. Overall he feels well, in his usual state of health. He denies hematuria. He did see urology and nephrology after his  hospital stay for hematuria but not recently. He denies chest pain or shortness of breath. His GERD symptoms are under better control. He states he had a recent coronary calcium score and wants to know if I have seen the results. I told him I had not been asked him to forward those to me.       Past Medical History:   Diagnosis Date    Acute renal failure (ARF) ([address] 2021    hematuria, sepsis, hosp stay    Arthritis     right knee    Back problem     Benign prostatic hyperplasia with urinary obstruction     [facility]rology group[facility]    Bronchitis     Diabetes mellitus type 2, controlled ([address]     Dysphagia     better after  egd    Fatty liver     Hypertension        Current Outpatient Medications   Medication Sig Dispense Refill    pravastatin (PRAVACHOL) 20 MG tablet TAKE ONE TABLET BY MOUTH DAILY 90 tablet 0    NIFEdipine (PROCARDIA XL) 90 MG extended release tablet TAKE ONE TABLET BY MOUTH DAILY 60 tablet 3    omeprazole (PRILOSEC) 40 MG delayed release capsule TAKE ONE CAPSULE BY MOUTH EVERY MORNING BEFORE BREAKFAST AS NEEDED (REFLUX) 30 capsule 5    losartan-hydroCHLOROthiazide (HYZAAR) 100-25 MG per tablet TAKE ONE TABLET BY MOUTH DAILY 90 tablet 4    tamsulosin (FLOMAX) 0.4 MG capsule Take 0.4 mg by mouth daily      Multiple Vitamins-Minerals (THERAPEUTIC MULTIVITAMIN-MINERALS) tablet Take 1 tablet by mouth daily       No current facility-administered medications for this visit. Physical Exam  Vitals and nursing note reviewed. Constitutional:       General: He is not in acute distress. Appearance: He is well-developed. HENT:      Head: Normocephalic and atraumatic. Right Ear: External ear normal.      Left Ear: External ear normal.   Eyes:      General: No scleral icterus. Extraocular Movements: Extraocular movements intact. Neck:      Thyroid: No thyromegaly. Cardiovascular:      Rate and Rhythm: Normal rate and regular rhythm. Heart sounds: No murmur heard. Pulmonary:      Effort: No respiratory distress. Breath sounds: Normal breath sounds. No wheezing or rales. Abdominal:      General: Bowel sounds are normal. There is no distension. Palpations: Abdomen is soft. Tenderness: There is no abdominal tenderness. Musculoskeletal:         General: No deformity. Normal range of motion. Cervical back: Normal range of motion. Lymphadenopathy:      Cervical: No cervical adenopathy. Skin:     General: Skin is warm and dry. Neurological:      Mental Status: He is alert and oriented to person, place, and time. Cranial Nerves: No cranial nerve deficit. Sensory: No sensory deficit. Coordination: Coordination normal.   Psychiatric:         Thought Content:  Thought content normal.               This note was generated completely or in part utilizing Dragon dictation speech recognition software. Occasionally, words are mistranscribed and despite editing, the text may contain inaccuracies due to incorrect word recognition. If further clarification is needed please contact the office at [phone]          An electronic signature was used to authenticate this note.     --Sonia Faustin MD

## 2021-11-19 ENCOUNTER — HOSPITAL ENCOUNTER (OUTPATIENT)
Age: 56
Discharge: HOME | End: 2021-11-19
Payer: COMMERCIAL

## 2021-11-19 DIAGNOSIS — E78.5 HYPERLIPIDEMIA, UNSPECIFIED HYPERLIPIDEMIA TYPE: ICD-10-CM

## 2021-11-19 DIAGNOSIS — I10 ESSENTIAL HYPERTENSION: ICD-10-CM

## 2021-11-19 DIAGNOSIS — K21.9 GASTROESOPHAGEAL REFLUX DISEASE WITHOUT ESOPHAGITIS: ICD-10-CM

## 2021-11-19 DIAGNOSIS — E11.9 TYPE 2 DIABETES MELLITUS WITHOUT COMPLICATION, WITHOUT LONG-TERM CURRENT USE OF INSULIN (HCC): ICD-10-CM

## 2021-11-19 DIAGNOSIS — Z12.5 SCREENING FOR PROSTATE CANCER: ICD-10-CM

## 2021-11-19 DIAGNOSIS — Z11.59 SCREENING FOR VIRAL DISEASE: ICD-10-CM

## 2021-11-19 LAB
A/G RATIO: 1.5 (ref 1.1–2.2)
ALBUMIN SERPL-MCNC: 4.8 G/DL (ref 3.4–5)
ALP BLD-CCNC: 42 U/L (ref 40–129)
ALT SERPL-CCNC: 33 U/L (ref 10–40)
ANION GAP SERPL CALCULATED.3IONS-SCNC: 15 MMOL/L (ref 3–16)
AST SERPL-CCNC: 23 U/L (ref 15–37)
BILIRUB SERPL-MCNC: 0.4 MG/DL (ref 0–1)
BUN BLDV-MCNC: 17 MG/DL (ref 7–20)
CALCIUM SERPL-MCNC: 9.9 MG/DL (ref 8.3–10.6)
CHLORIDE BLD-SCNC: 98 MMOL/L (ref 99–110)
CHOLESTEROL, TOTAL: 138 MG/DL (ref 0–199)
CO2: 26 MMOL/L (ref 21–32)
CREAT SERPL-MCNC: 1 MG/DL (ref 0.9–1.3)
GFR AFRICAN AMERICAN: >60
GFR NON-AFRICAN AMERICAN: >60
GLUCOSE BLD-MCNC: 97 MG/DL (ref 70–99)
HDLC SERPL-MCNC: 67 MG/DL (ref 40–60)
HEPATITIS C ANTIBODY INTERPRETATION: NORMAL
LDL CHOLESTEROL CALCULATED: 56 MG/DL
MAGNESIUM: 1.9 MG/DL (ref 1.8–2.4)
PARATHYROID HORMONE INTACT: 39.3 PG/ML (ref 14–72)
POTASSIUM SERPL-SCNC: 3.3 MMOL/L (ref 3.5–5.1)
PROSTATE SPECIFIC ANTIGEN: 0.55 NG/ML (ref 0–4)
SODIUM BLD-SCNC: 139 MMOL/L (ref 136–145)
TOTAL PROTEIN: 8 G/DL (ref 6.4–8.2)
TRIGL SERPL-MCNC: 74 MG/DL (ref 0–150)
VLDLC SERPL CALC-MCNC: 15 MG/DL

## 2021-11-19 PROCEDURE — 80061 LIPID PANEL: CPT

## 2021-11-19 PROCEDURE — 86803 HEPATITIS C AB TEST: CPT

## 2021-11-19 PROCEDURE — 36415 COLL VENOUS BLD VENIPUNCTURE: CPT

## 2021-11-19 PROCEDURE — 83735 ASSAY OF MAGNESIUM: CPT

## 2021-11-19 PROCEDURE — 80053 COMPREHEN METABOLIC PANEL: CPT

## 2021-11-19 PROCEDURE — 83036 HEMOGLOBIN GLYCOSYLATED A1C: CPT

## 2021-11-19 PROCEDURE — 83970 ASSAY OF PARATHORMONE: CPT

## 2021-11-19 PROCEDURE — 84153 ASSAY OF PSA TOTAL: CPT

## 2021-11-20 LAB
ESTIMATED AVERAGE GLUCOSE: 128.4 MG/DL
HBA1C MFR BLD: 6.1 %

## 2021-11-21 RX ORDER — POTASSIUM CHLORIDE 750 MG/1
10 TABLET, EXTENDED RELEASE ORAL DAILY
Qty: 30 TABLET | Refills: 5 | Status: SHIPPED | OUTPATIENT
Start: 2021-11-21 | End: 2022-05-28

## 2021-12-06 NOTE — PROGRESS NOTES
[facility]     Outpatient Cardiology         Chief Complaint   Patient presents with    Abnormal Test Results     coronary calcium score s/p CTA       HPI     Noma Mohs a 49 y.o. male here for CT calcium score of 742. Referred by PCP. Hx of HLD, DM II, HTN. Today he comes referred by his primary care physician for significant elevated coronary artery calcium score of 700. His LDL is at goal on current dose of Crestor. HTN well-controlled with current antihypertensives including nifedipine and Hyzaar.       PMH  Past Medical History:   Diagnosis Date    Acute renal failure (ARF) ([address] 02/11/2021    hematuria, sepsis, hosp stay    Arthritis     right knee    Back problem     Benign prostatic hyperplasia with urinary obstruction     [facility]    Bronchitis     Diabetes mellitus type 2, controlled ([address]     Dysphagia     better after 11/19 egd    Fatty liver     Hypertension        PSH  Past Surgical History:   Procedure Laterality Date    COLONOSCOPY  07/2017 11/19 fu 5 yrs    CYSTOSCOPY  04/2021    KNEE SURGERY      right mcl 30 years ago times 2    UPPER GASTROINTESTINAL ENDOSCOPY      11/19 - , jinny ring        Social HIstory  Social History     Tobacco Use    Smoking status: Never Smoker    Smokeless tobacco: Former User     Types: Chew   Substance Use Topics    Alcohol use: Yes     Comment: three times weekly    Drug use: No       Family History  Family History   Problem Relation Age of Onset    Breast Cancer Mother     Diabetes Mother     Hypertension Mother     Asthma Mother     COPD Mother     High Cholesterol Mother     Heart Failure Father     Kidney Disease Father         and was on dialyisis     Hypertension Father     Stroke Father     High Cholesterol Father     Prostate Cancer Maternal Grandfather        Allergies   No Known Allergies    Medications:     Home Medications:  Were reviewed and are listed in nursing record. and/or listed below    Prior to Admission medications    Medication Sig Start Date End Date Taking? Authorizing Provider   potassium chloride (KLOR-CON M) 10 MEQ extended release tablet Take 1 tablet by mouth daily 11/21/21  Yes Ernesto Juan MD   rosuvastatin (CRESTOR) 10 MG tablet Take 1 tablet by mouth nightly 11/18/21  Yes Ernesto Juan MD   NIFEdipine (PROCARDIA XL) 90 MG extended release tablet TAKE ONE TABLET BY MOUTH DAILY 6/29/21  Yes Mick Gage,    losartan-hydroCHLOROthiazide (HYZAAR) 100-25 MG per tablet TAKE ONE TABLET BY MOUTH DAILY 3/29/21  Yes Penny Holbrook,    tamsulosin (FLOMAX) 0.4 MG capsule Take 0.4 mg by mouth daily   Yes Historical Provider, MD   Multiple Vitamins-Minerals (THERAPEUTIC MULTIVITAMIN-MINERALS) tablet Take 1 tablet by mouth daily   Yes Historical Provider, MD   omeprazole (PRILOSEC) 40 MG delayed release capsule TAKE ONE CAPSULE BY MOUTH EVERY MORNING BEFORE BREAKFAST AS NEEDED (REFLUX)  Patient not taking: Reported on 12/7/2021 5/20/21   Ernesto Juan MD        Review of Systems   Constitutional: Negative for activity change, appetite change, diaphoresis, fatigue, fever and unexpected weight change. HENT: Negative for congestion, facial swelling, mouth sores and nosebleeds. Eyes: Negative for discharge and visual disturbance. Respiratory: Negative for cough, chest tightness, shortness of breath and wheezing. Cardiovascular: Negative for chest pain, palpitations and leg swelling. Gastrointestinal: Negative for abdominal distention, abdominal pain, blood in stool and vomiting. Endocrine: Negative for cold intolerance, heat intolerance and polyuria. Genitourinary: Negative for difficulty urinating, dysuria, frequency and hematuria. Musculoskeletal: Negative for back pain, joint swelling, myalgias and neck pain. Skin: Negative for color change, pallor and rash.    Allergic/Immunologic: Negative for immunocompromised state. Neurological: Negative for dizziness, syncope, weakness, light-headedness, numbness and headaches. Hematological: Negative for adenopathy. Does not bruise/bleed easily. Psychiatric/Behavioral: Negative for behavioral problems, confusion, decreased concentration and suicidal ideas. The patient is not nervous/anxious. Vitals:    12/07/21 1254   BP: 130/78   Pulse: 94    Weight: 218 lb 6.4 oz (99.1 kg)       Vitals:    12/07/21 1254   BP: 130/78   Pulse: 94   Weight: 218 lb 6.4 oz (99.1 kg)   Height: 6' 2\" (1.88 m)       BP Readings from Last 3 Encounters:   12/07/21 130/78   11/18/21 122/80   06/14/21 137/78       Wt Readings from Last 3 Encounters:   12/07/21 218 lb 6.4 oz (99.1 kg)   11/18/21 213 lb (96.6 kg)   06/14/21 211 lb 6.4 oz (95.9 kg)       Physical Exam  Constitutional:       General: He is not in acute distress. Appearance: He is well-developed. He is not diaphoretic. HENT:      Head: Normocephalic and atraumatic. Eyes:      Pupils: Pupils are equal, round, and reactive to light. Neck:      Thyroid: No thyromegaly. Vascular: No JVD. Cardiovascular:      Rate and Rhythm: Normal rate and regular rhythm. Chest Wall: PMI is not displaced. Heart sounds: Normal heart sounds, S1 normal and S2 normal. No murmur heard. No friction rub. No gallop. Pulmonary:      Effort: Pulmonary effort is normal. No respiratory distress. Breath sounds: Normal breath sounds. No stridor. No wheezing or rales. Chest:      Chest wall: No tenderness. Abdominal:      General: Bowel sounds are normal. There is no distension. Palpations: Abdomen is soft. Tenderness: There is no abdominal tenderness. There is no guarding or rebound. Musculoskeletal:         General: No tenderness. Normal range of motion. Cervical back: Normal range of motion. Lymphadenopathy:      Cervical: No cervical adenopathy. Skin:     General: Skin is warm and dry. Findings: No erythema or rash. Neurological:      Mental Status: He is alert and oriented to person, place, and time. Coordination: Coordination normal.   Psychiatric:         Behavior: Behavior normal.         Thought Content:  Thought content normal.         Judgment: Judgment normal.         Labs:       Lab Results   Component Value Date    WBC 6.9 06/09/2021    HGB 13.6 06/09/2021    HCT 39.9 (L) 06/09/2021    MCV 85.5 06/09/2021     06/09/2021     Lab Results   Component Value Date     11/19/2021    K 3.3 (L) 11/19/2021    CL 98 (L) 11/19/2021    CO2 26 11/19/2021    BUN 17 11/19/2021    CREATININE 1.0 11/19/2021    GLUCOSE 97 11/19/2021    CALCIUM 9.9 11/19/2021    PROT 8.0 11/19/2021    LABALBU 4.8 11/19/2021    BILITOT 0.4 11/19/2021    ALKPHOS 42 11/19/2021    AST 23 11/19/2021    ALT 33 11/19/2021    LABGLOM >60 11/19/2021    GFRAA >60 11/19/2021    AGRATIO 1.5 11/19/2021    GLOB 5.0 02/10/2021         Lab Results   Component Value Date    CHOL 138 11/19/2021    CHOL 159 10/30/2020     Lab Results   Component Value Date    TRIG 74 11/19/2021    TRIG 159 (H) 10/30/2020     Lab Results   Component Value Date    HDL 67 (H) 11/19/2021    HDL 74 (H) 10/30/2020    HDL 79 (H) 10/23/2019     Lab Results   Component Value Date    LDLCALC 56 11/19/2021    LDLCALC 53 10/30/2020    [address] 10/23/2019     Lab Results   Component Value Date    LABVLDL 15 11/19/2021    LABVLDL 32 10/30/2020    LABVLDL 21 10/23/2019     No results found for: CHOLHDLRATIO    No results found for: INR, PROTIME    The 10-year ASCVD risk score (Raul Dunlap et al., 2013) is: 16.3%    Values used to calculate the score:      Age: 49 years      Sex: Male      Is Non- : Yes      Diabetic: Yes      Tobacco smoker: No      Systolic Blood Pressure: 901 mmHg      Is BP treated: Yes      HDL Cholesterol: 67 mg/dL      Total Cholesterol: 138 mg/dL      Imaging:       Last ECG (if available, Personally

## 2021-12-07 ENCOUNTER — OFFICE VISIT (OUTPATIENT)
Dept: CARDIOLOGY CLINIC | Age: 56
End: 2021-12-07
Payer: COMMERCIAL

## 2021-12-07 VITALS
SYSTOLIC BLOOD PRESSURE: 130 MMHG | BODY MASS INDEX: 28.03 KG/M2 | DIASTOLIC BLOOD PRESSURE: 78 MMHG | WEIGHT: 218.4 LBS | HEIGHT: 74 IN | HEART RATE: 94 BPM

## 2021-12-07 DIAGNOSIS — E78.5 HYPERLIPIDEMIA, UNSPECIFIED HYPERLIPIDEMIA TYPE: ICD-10-CM

## 2021-12-07 DIAGNOSIS — R93.1 AGATSTON CAC SCORE, >400: ICD-10-CM

## 2021-12-07 DIAGNOSIS — R94.31 ABNORMAL EKG: Primary | ICD-10-CM

## 2021-12-07 DIAGNOSIS — I10 ESSENTIAL HYPERTENSION: ICD-10-CM

## 2021-12-07 PROCEDURE — 93000 ELECTROCARDIOGRAM COMPLETE: CPT | Performed by: INTERNAL MEDICINE

## 2021-12-07 PROCEDURE — 99204 OFFICE O/P NEW MOD 45 MIN: CPT | Performed by: INTERNAL MEDICINE

## 2021-12-07 ASSESSMENT — ENCOUNTER SYMPTOMS
ABDOMINAL PAIN: 0
COLOR CHANGE: 0
EYE DISCHARGE: 0
COUGH: 0
FACIAL SWELLING: 0
BLOOD IN STOOL: 0
WHEEZING: 0
ABDOMINAL DISTENTION: 0
VOMITING: 0
SHORTNESS OF BREATH: 0
CHEST TIGHTNESS: 0
BACK PAIN: 0

## 2021-12-07 NOTE — ASSESSMENT & PLAN NOTE
Significantly elevated (although even distribution) with normal LDL. Continue Crestor.   Plan for stress test

## 2021-12-27 ENCOUNTER — HOSPITAL ENCOUNTER (OUTPATIENT)
Dept: NON INVASIVE DIAGNOSTICS | Age: 56
Discharge: HOME | End: 2021-12-27
Payer: COMMERCIAL

## 2021-12-27 DIAGNOSIS — I10 ESSENTIAL HYPERTENSION: ICD-10-CM

## 2021-12-27 DIAGNOSIS — R94.31 ABNORMAL EKG: ICD-10-CM

## 2021-12-27 DIAGNOSIS — E78.5 HYPERLIPIDEMIA, UNSPECIFIED HYPERLIPIDEMIA TYPE: ICD-10-CM

## 2021-12-27 LAB
LV EF: 70 %
LVEF MODALITY: NORMAL

## 2021-12-27 PROCEDURE — A9502 TC99M TETROFOSMIN: HCPCS | Performed by: INTERNAL MEDICINE

## 2021-12-27 PROCEDURE — 3430000000 HC RX DIAGNOSTIC RADIOPHARMACEUTICAL: Performed by: INTERNAL MEDICINE

## 2021-12-27 PROCEDURE — 78452 HT MUSCLE IMAGE SPECT MULT: CPT | Performed by: INTERNAL MEDICINE

## 2021-12-27 PROCEDURE — 93017 CV STRESS TEST TRACING ONLY: CPT | Performed by: INTERNAL MEDICINE

## 2021-12-27 RX ADMIN — TETROFOSMIN 10 MILLICURIE: 1.38 INJECTION, POWDER, LYOPHILIZED, FOR SOLUTION INTRAVENOUS at 07:29

## 2021-12-27 RX ADMIN — TETROFOSMIN 30 MILLICURIE: 1.38 INJECTION, POWDER, LYOPHILIZED, FOR SOLUTION INTRAVENOUS at 08:41

## 2021-12-27 NOTE — PROGRESS NOTES
Patient instructed on Yohan Protocol Stress Test Procedure including possible side effects and adverse reactions. Verbalizes knowledge and understanding and denies having any questions.

## 2022-03-12 DIAGNOSIS — I10 ESSENTIAL HYPERTENSION: ICD-10-CM

## 2022-03-14 RX ORDER — NIFEDIPINE 90 MG/1
90 TABLET, EXTENDED RELEASE ORAL DAILY
Qty: 30 TABLET | Refills: 0 | Status: SHIPPED | OUTPATIENT
Start: 2022-03-14 | End: 2022-04-21 | Stop reason: SDUPTHER

## 2022-03-14 NOTE — TELEPHONE ENCOUNTER
Last appointment: 11/18/2021  Next appointment: Visit date not found  Last refill: 6/29/2021  not due back in until around 5/18/2022

## 2022-03-16 DIAGNOSIS — I10 ESSENTIAL HYPERTENSION: Primary | ICD-10-CM

## 2022-03-16 RX ORDER — ROSUVASTATIN CALCIUM 10 MG/1
TABLET, COATED ORAL
Qty: 30 TABLET | Refills: 3 | Status: SHIPPED | OUTPATIENT
Start: 2022-03-16 | End: 2022-05-19 | Stop reason: SDUPTHER

## 2022-03-16 NOTE — TELEPHONE ENCOUNTER
Last appointment: 11/18/2021  Next appointment: Visit date not found  Last refill: 11/18/2021 with 3 additional refills    Mychart message sent reminding the patient to schedule a 6 month follow up

## 2022-04-21 DIAGNOSIS — I10 ESSENTIAL HYPERTENSION: ICD-10-CM

## 2022-04-21 RX ORDER — NIFEDIPINE 90 MG/1
90 TABLET, EXTENDED RELEASE ORAL DAILY
Qty: 30 TABLET | Refills: 3 | Status: SHIPPED | OUTPATIENT
Start: 2022-04-21 | End: 2022-08-27

## 2022-04-25 NOTE — PROGRESS NOTES
[facility]  [address] [place] [facility] [address]  Dept: [phone]  Dept Fax: [phone]  Loc: [phone]     12/3/2020     Berenda Skiff (:  1965) is a 50 y.o. male, here for evaluation of the following medical concerns:    Chief Complaint   Patient presents with    Follow-up     Right instep pain. Recently started exercising daily. Declined flu vaccine. HPI   Patient complains of right foot pain. Seems to have been exacerbated by exercise on the bike which he has been doing to try to improve his weight. He reports he has been drinking 3-4 alcoholic drinks per day after work. He states it helps him sleep. He denies drinking and driving. He denies getting angry after drinking. He states he wakes up every day for work. He does not see it as a problem. Newly diagnosed with diabetes mellitus type 2. Is trying to work on reducing his sugar intake. Review of Systems    Prior to Visit Medications    Medication Sig Taking?  Authorizing Provider   losartan-hydroCHLOROthiazide (HYZAAR) 100-25 MG per tablet TAKE ONE TABLET BY MOUTH DAILY  Carrie Walter MD   pravastatin (PRAVACHOL) 20 MG tablet Take 1 tablet by mouth daily  Carrie Sewell MD   NIFEdipine (PROCARDIA XL) 90 MG extended release tablet TAKE ONE TABLET BY MOUTH DAILY  Carrie Sewell MD   omeprazole (PRILOSEC) 40 MG delayed release capsule TAKE ONE CAPSULE BY MOUTH EVERY MORNING BEFORE BREAKFAST AS NEEDED (REFLUX)  Marcus Avitia MD   Multiple Vitamins-Minerals (THERAPEUTIC MULTIVITAMIN-MINERALS) tablet Take 1 tablet by mouth daily  Historical Provider, MD        Social History     Tobacco Use    Smoking status: Never Smoker    Smokeless tobacco: Former User     Types: Chew   Substance Use Topics    Alcohol use: Yes     Frequency: 2-3 times a week     Drinks per session: 3 or 4     Binge frequency: Less than monthly Opioid Pregnancy And Lactation Text: These medications can lead to premature delivery and should be avoided during pregnancy. These medications are also present in breast milk in small amounts.

## 2022-05-18 ENCOUNTER — OFFICE VISIT (OUTPATIENT)
Dept: INTERNAL MEDICINE CLINIC | Age: 57
End: 2022-05-18
Payer: COMMERCIAL

## 2022-05-18 VITALS
OXYGEN SATURATION: 98 % | DIASTOLIC BLOOD PRESSURE: 78 MMHG | SYSTOLIC BLOOD PRESSURE: 128 MMHG | BODY MASS INDEX: 27.45 KG/M2 | HEART RATE: 91 BPM | WEIGHT: 213.8 LBS

## 2022-05-18 DIAGNOSIS — E78.5 HYPERLIPIDEMIA, UNSPECIFIED HYPERLIPIDEMIA TYPE: ICD-10-CM

## 2022-05-18 DIAGNOSIS — R13.10 DYSPHAGIA, UNSPECIFIED TYPE: ICD-10-CM

## 2022-05-18 DIAGNOSIS — E83.52 HYPERCALCEMIA: ICD-10-CM

## 2022-05-18 DIAGNOSIS — I10 ESSENTIAL HYPERTENSION: ICD-10-CM

## 2022-05-18 DIAGNOSIS — R79.89 ELEVATED SERUM CREATININE: ICD-10-CM

## 2022-05-18 DIAGNOSIS — E11.9 TYPE 2 DIABETES MELLITUS WITHOUT COMPLICATION, WITHOUT LONG-TERM CURRENT USE OF INSULIN (HCC): Primary | ICD-10-CM

## 2022-05-18 DIAGNOSIS — Z11.59 SCREENING FOR VIRAL DISEASE: ICD-10-CM

## 2022-05-18 DIAGNOSIS — E87.6 HYPOKALEMIA: ICD-10-CM

## 2022-05-18 PROCEDURE — 99214 OFFICE O/P EST MOD 30 MIN: CPT | Performed by: INTERNAL MEDICINE

## 2022-05-18 RX ORDER — OMEPRAZOLE 40 MG/1
CAPSULE, DELAYED RELEASE ORAL
Qty: 30 CAPSULE | Refills: 5 | Status: SHIPPED | OUTPATIENT
Start: 2022-05-18 | End: 2022-09-13

## 2022-05-18 SDOH — ECONOMIC STABILITY: FOOD INSECURITY: WITHIN THE PAST 12 MONTHS, YOU WORRIED THAT YOUR FOOD WOULD RUN OUT BEFORE YOU GOT MONEY TO BUY MORE.: NEVER TRUE

## 2022-05-18 SDOH — ECONOMIC STABILITY: FOOD INSECURITY: WITHIN THE PAST 12 MONTHS, THE FOOD YOU BOUGHT JUST DIDN'T LAST AND YOU DIDN'T HAVE MONEY TO GET MORE.: NEVER TRUE

## 2022-05-18 ASSESSMENT — PATIENT HEALTH QUESTIONNAIRE - PHQ9
SUM OF ALL RESPONSES TO PHQ9 QUESTIONS 1 & 2: 2
SUM OF ALL RESPONSES TO PHQ QUESTIONS 1-9: 2
2. FEELING DOWN, DEPRESSED OR HOPELESS: 1
SUM OF ALL RESPONSES TO PHQ QUESTIONS 1-9: 2
1. LITTLE INTEREST OR PLEASURE IN DOING THINGS: 1
SUM OF ALL RESPONSES TO PHQ QUESTIONS 1-9: 2
SUM OF ALL RESPONSES TO PHQ QUESTIONS 1-9: 2

## 2022-05-18 ASSESSMENT — SOCIAL DETERMINANTS OF HEALTH (SDOH): HOW HARD IS IT FOR YOU TO PAY FOR THE VERY BASICS LIKE FOOD, HOUSING, MEDICAL CARE, AND HEATING?: NOT HARD AT ALL

## 2022-05-18 NOTE — PROGRESS NOTES
Pamella Mcadams (:  1965) is a 49 y.o. male, here for evaluation of the following chief complaint(s):    Follow-up (blood pressure check )      ASSESSMENT/PLAN:  1. Type 2 diabetes mellitus without complication, without long-term current use of insulin (HCC)  Diet controlled  -     Diabetic Foot Exam  -     Microalbumin / Creatinine Urine Ratio  2. Essential hypertension  Well-controlled on nifedipine and losartan HCTZ  3. Hyperlipidemia, unspecified hyperlipidemia type  Stable on Crestor    4. Hypokalemia  -     Basic Metabolic Panel; Future  5. Dysphagia, unspecified type  -     Add back omeprazole (PRILOSEC) 40 MG delayed release capsule; TAKE ONE CAPSULE BY MOUTH EVERY MORNING BEFORE BREAKFAST AS NEEDED (REFLUX), Disp-30 capsule, R-5Normal  -     If symptoms don't resolved, then: [facility] - Mae Singh MD, Gastroenterology, [facility]  6. Hypercalcemia  -     PTH, Intact; Future  -     Electrophoresis Protein, Serum; Future  7. Screening for viral disease  -     Hepatitis B Surface Antibody; Future     Return in about 6 months (around 2022). SUBJECTIVE/OBJECTIVE:  HPI   Patient is here for routine visit. Overall he is feeling well and without complaints except for continued occasional choking/puking after eating. He especially notes that after eating bread. He has not been taking his proton pump inhibitor. We reviewed his previous EGD report from 2019 showing Schatzki ring.       Past Medical History:   Diagnosis Date    Acute renal failure (ARF) ([facility] 2021    hematuria, sepsis, hosp stay    Arthritis     right knee    Back problem     Benign prostatic hyperplasia with urinary obstruction     [facility]rology group[facility]    Bronchitis     Diabetes mellitus type 2, controlled ([facility]     Dysphagia     better after  egd    Fatty liver     Hypertension        Current Outpatient Medications   Medication Sig Dispense Refill    omeprazole (PRILOSEC) 40 MG delayed release capsule TAKE ONE CAPSULE BY MOUTH EVERY MORNING BEFORE BREAKFAST AS NEEDED (REFLUX) 30 capsule 5    NIFEdipine (PROCARDIA XL) 90 MG extended release tablet Take 1 tablet by mouth daily 30 tablet 3    potassium chloride (KLOR-CON M) 10 MEQ extended release tablet Take 1 tablet by mouth daily 30 tablet 5    losartan-hydroCHLOROthiazide (HYZAAR) 100-25 MG per tablet TAKE ONE TABLET BY MOUTH DAILY 90 tablet 4    Multiple Vitamins-Minerals (THERAPEUTIC MULTIVITAMIN-MINERALS) tablet Take 1 tablet by mouth daily      rosuvastatin (CRESTOR) 10 MG tablet Take 1 tablet by mouth daily 30 tablet 3     No current facility-administered medications for this visit. Physical Exam  Vitals and nursing note reviewed. Constitutional:       General: He is not in acute distress. Appearance: He is well-developed. HENT:      Head: Normocephalic and atraumatic. Right Ear: External ear normal.      Left Ear: External ear normal.   Eyes:      General: No scleral icterus. Extraocular Movements: Extraocular movements intact. Neck:      Thyroid: No thyromegaly. Cardiovascular:      Rate and Rhythm: Normal rate and regular rhythm. Heart sounds: No murmur heard. Pulmonary:      Effort: No respiratory distress. Breath sounds: Normal breath sounds. No wheezing or rales. Abdominal:      General: Bowel sounds are normal. There is no distension. Palpations: Abdomen is soft. Tenderness: There is no abdominal tenderness. Musculoskeletal:         General: No deformity. Normal range of motion. Cervical back: Normal range of motion. Right lower leg: No edema. Left lower leg: No edema. Lymphadenopathy:      Cervical: No cervical adenopathy. Skin:     General: Skin is warm and dry. Neurological:      Mental Status: He is alert and oriented to person, place, and time. Cranial Nerves: No cranial nerve deficit. Sensory: No sensory deficit.       Coordination: Coordination normal.   Psychiatric:         Thought Content: Thought content normal.          125/80  Sensory exam of the foot is normal, tested with the monofilament. Good pulses, no lesions or ulcers, good peripheral pulses. This note was generated completely or in part utilizing Dragon dictation speech recognition software. Occasionally, words are mistranscribed and despite editing, the text may contain inaccuracies due to incorrect word recognition. If further clarification is needed please contact the office at [phone]          An electronic signature was used to authenticate this note.     --Lili Lees MD

## 2022-05-19 DIAGNOSIS — I10 ESSENTIAL HYPERTENSION: ICD-10-CM

## 2022-05-19 LAB
CREATININE URINE: 276.7 MG/DL (ref 39–259)
MICROALBUMIN UR-MCNC: 5.7 MG/DL
MICROALBUMIN/CREAT UR-RTO: 20.6 MG/G (ref 0–30)

## 2022-05-19 RX ORDER — ROSUVASTATIN CALCIUM 10 MG/1
10 TABLET, COATED ORAL DAILY
Qty: 30 TABLET | Refills: 3 | Status: SHIPPED | OUTPATIENT
Start: 2022-05-19

## 2022-05-20 DIAGNOSIS — E83.52 HYPERCALCEMIA: ICD-10-CM

## 2022-05-20 LAB — PARATHYROID HORMONE INTACT: 60 PG/ML (ref 14–72)

## 2022-05-23 LAB
ALBUMIN SERPL-MCNC: 3.9 G/DL (ref 3.1–4.9)
ALPHA-1-GLOBULIN: 0.3 G/DL (ref 0.2–0.4)
ALPHA-2-GLOBULIN: 0.9 G/DL (ref 0.4–1.1)
BETA GLOBULIN: 1.5 G/DL (ref 0.9–1.6)
GAMMA GLOBULIN: 1.7 G/DL (ref 0.6–1.8)
SPE/IFE INTERPRETATION: NORMAL
TOTAL PROTEIN: 8.3 G/DL (ref 6.4–8.2)

## 2022-05-25 ENCOUNTER — TELEPHONE (OUTPATIENT)
Dept: INTERNAL MEDICINE CLINIC | Age: 57
End: 2022-05-25

## 2022-05-28 RX ORDER — POTASSIUM CHLORIDE 750 MG/1
TABLET, EXTENDED RELEASE ORAL
Qty: 30 TABLET | Refills: 5 | Status: SHIPPED | OUTPATIENT
Start: 2022-05-28 | End: 2022-08-09 | Stop reason: ALTCHOICE

## 2022-05-28 NOTE — TELEPHONE ENCOUNTER
Last appointment: 5/18/2022  Next appointment: Visit date not found  Last refill: 11/21/2021  Appointment not due for >6 months.

## 2022-06-24 DIAGNOSIS — I10 ESSENTIAL HYPERTENSION: ICD-10-CM

## 2022-06-24 RX ORDER — LOSARTAN POTASSIUM AND HYDROCHLOROTHIAZIDE 25; 100 MG/1; MG/1
TABLET ORAL
Qty: 90 TABLET | Refills: 4 | Status: SHIPPED | OUTPATIENT
Start: 2022-06-24 | End: 2022-08-09 | Stop reason: ALTCHOICE

## 2022-07-26 DIAGNOSIS — R79.89 ELEVATED SERUM CREATININE: Primary | ICD-10-CM

## 2022-08-03 ENCOUNTER — HOSPITAL ENCOUNTER (OUTPATIENT)
Age: 57
Discharge: HOME | End: 2022-08-03
Payer: COMMERCIAL

## 2022-08-03 ENCOUNTER — HOSPITAL ENCOUNTER (OUTPATIENT)
Dept: ULTRASOUND IMAGING | Age: 57
Discharge: HOME | End: 2022-08-03
Payer: COMMERCIAL

## 2022-08-03 DIAGNOSIS — N17.9 AKI (ACUTE KIDNEY INJURY) (HCC): ICD-10-CM

## 2022-08-03 LAB
ALBUMIN SERPL-MCNC: 4.4 G/DL (ref 3.4–5)
ANION GAP SERPL CALCULATED.3IONS-SCNC: 14 MMOL/L (ref 3–16)
BACTERIA: NORMAL /HPF
BUN BLDV-MCNC: 19 MG/DL (ref 7–20)
CALCIUM SERPL-MCNC: 9.3 MG/DL (ref 8.3–10.6)
CHLORIDE BLD-SCNC: 100 MMOL/L (ref 99–110)
CO2: 26 MMOL/L (ref 21–32)
CREAT SERPL-MCNC: 1.2 MG/DL (ref 0.9–1.3)
CREATININE URINE: 138.4 MG/DL (ref 39–259)
EPITHELIAL CELLS, UA: 1 /HPF (ref 0–5)
GFR AFRICAN AMERICAN: >60
GFR NON-AFRICAN AMERICAN: >60
GLUCOSE BLD-MCNC: 102 MG/DL (ref 70–99)
HYALINE CASTS: 1 /LPF (ref 0–8)
MICROALBUMIN UR-MCNC: <1.2 MG/DL
MICROALBUMIN/CREAT UR-RTO: NORMAL MG/G (ref 0–30)
PHOSPHORUS: 3.4 MG/DL (ref 2.5–4.9)
POTASSIUM SERPL-SCNC: 3.7 MMOL/L (ref 3.5–5.1)
RBC UA: 1 /HPF (ref 0–4)
SODIUM BLD-SCNC: 140 MMOL/L (ref 136–145)
URINE TYPE: NORMAL
WBC UA: 1 /HPF (ref 0–5)

## 2022-08-03 PROCEDURE — 80069 RENAL FUNCTION PANEL: CPT

## 2022-08-03 PROCEDURE — 82570 ASSAY OF URINE CREATININE: CPT

## 2022-08-03 PROCEDURE — 81015 MICROSCOPIC EXAM OF URINE: CPT

## 2022-08-03 PROCEDURE — 36415 COLL VENOUS BLD VENIPUNCTURE: CPT

## 2022-08-03 PROCEDURE — 82088 ASSAY OF ALDOSTERONE: CPT

## 2022-08-03 PROCEDURE — 82043 UR ALBUMIN QUANTITATIVE: CPT

## 2022-08-03 PROCEDURE — 76770 US EXAM ABDO BACK WALL COMP: CPT

## 2022-08-03 PROCEDURE — 84244 ASSAY OF RENIN: CPT

## 2022-08-06 LAB
ALDOSTERONE/RENIN ACTIVITY CALCULATION: 0.1 RATIO
ALDOSTERONE: 5.8 NG/DL
RENIN ACTIVITY: 40.1 NG/ML/HR

## 2022-08-27 DIAGNOSIS — I10 ESSENTIAL HYPERTENSION: ICD-10-CM

## 2022-08-27 RX ORDER — NIFEDIPINE 90 MG/1
TABLET, EXTENDED RELEASE ORAL
Qty: 30 TABLET | Refills: 2 | Status: SHIPPED | OUTPATIENT
Start: 2022-08-27 | End: 2022-09-07

## 2022-08-27 NOTE — TELEPHONE ENCOUNTER
Last appointment: 5/18/2022  Next appointment: Visit date not found  Last refill: 4/21/2022  Not due back in office until 11/18/2022

## 2022-08-30 DIAGNOSIS — I10 ESSENTIAL HYPERTENSION: ICD-10-CM

## 2022-08-30 RX ORDER — NIFEDIPINE 90 MG/1
TABLET, EXTENDED RELEASE ORAL
Qty: 30 TABLET | Refills: 2 | OUTPATIENT
Start: 2022-08-30

## 2022-08-30 NOTE — TELEPHONE ENCOUNTER
This was sent on 8/27/2022    E-Prescribing Status: Receipt confirmed by pharmacy (8/27/2022  5:11 PM EDT)

## 2022-09-07 DIAGNOSIS — I10 ESSENTIAL HYPERTENSION: ICD-10-CM

## 2022-09-07 RX ORDER — NIFEDIPINE 90 MG/1
TABLET, EXTENDED RELEASE ORAL
Qty: 30 TABLET | Refills: 2 | Status: SHIPPED | OUTPATIENT
Start: 2022-09-07

## 2022-09-07 NOTE — TELEPHONE ENCOUNTER
Last appointment: 5/18/2022  Next appointment: Visit date not found  Last refill: pharmacy did not receive script on 8/27

## 2022-09-13 DIAGNOSIS — R13.10 DYSPHAGIA, UNSPECIFIED TYPE: ICD-10-CM

## 2022-09-13 RX ORDER — OMEPRAZOLE 40 MG/1
CAPSULE, DELAYED RELEASE ORAL
Qty: 90 CAPSULE | Refills: 0 | Status: SHIPPED | OUTPATIENT
Start: 2022-09-13

## 2022-09-13 NOTE — TELEPHONE ENCOUNTER
Last appointment: 5/18/2022  Next appointment: Visit date not found  Last refill: no refills on file

## 2022-09-26 ENCOUNTER — TELEPHONE (OUTPATIENT)
Dept: INTERNAL MEDICINE CLINIC | Age: 57
End: 2022-09-26

## 2022-09-26 NOTE — TELEPHONE ENCOUNTER
Patient called the office complaining of dizziness when going from a sit to stand position. Denies chest pain, SOB. Has not taken blood pressure today. BP on Saturday was 132/92. Patient states he [place] to the firehouse to have BP taken. Requested patient have them take it when sitting and when going to sit to stand.  Patient will call back to let us know the readings

## 2022-09-28 NOTE — TELEPHONE ENCOUNTER
Left message on voicemail for return call, to follow up with him. Did he go to [facility] to have BP checked? If so what was it? Is he still experiencing dizziness?

## 2022-09-29 NOTE — TELEPHONE ENCOUNTER
Patient declined dizziness today and expressed verbal understanding that he needs to call if it comes back also will call back to schedule routine visit

## 2022-09-29 NOTE — TELEPHONE ENCOUNTER
Patient returning call to state he did go to the [facility] Monday around 10:30AM to check BP. It was 99/66. Patient also checked his BP on Tuesday and it was 121/79. Patient states he is not dizzy today. Please contact patient to further discuss.

## 2022-11-14 DIAGNOSIS — I10 ESSENTIAL HYPERTENSION: ICD-10-CM

## 2022-11-14 RX ORDER — ROSUVASTATIN CALCIUM 10 MG/1
TABLET, COATED ORAL
Qty: 30 TABLET | Refills: 3 | Status: SHIPPED | OUTPATIENT
Start: 2022-11-14 | End: 2022-11-21

## 2022-11-19 DIAGNOSIS — I10 ESSENTIAL HYPERTENSION: ICD-10-CM

## 2022-11-21 RX ORDER — ROSUVASTATIN CALCIUM 10 MG/1
TABLET, COATED ORAL
Qty: 30 TABLET | Refills: 0 | Status: SHIPPED | OUTPATIENT
Start: 2022-11-21 | End: 2022-12-05 | Stop reason: SDUPTHER

## 2022-11-25 DIAGNOSIS — N17.9 AKI (ACUTE KIDNEY INJURY) (HCC): ICD-10-CM

## 2022-11-25 LAB
ALBUMIN SERPL-MCNC: 4.4 G/DL (ref 3.4–5)
ANION GAP SERPL CALCULATED.3IONS-SCNC: 12 MMOL/L (ref 3–16)
BUN BLDV-MCNC: 21 MG/DL (ref 7–20)
CALCIUM SERPL-MCNC: 9.9 MG/DL (ref 8.3–10.6)
CHLORIDE BLD-SCNC: 100 MMOL/L (ref 99–110)
CO2: 26 MMOL/L (ref 21–32)
CREAT SERPL-MCNC: 1.2 MG/DL (ref 0.9–1.3)
GFR SERPL CREATININE-BSD FRML MDRD: >60 ML/MIN/{1.73_M2}
GLUCOSE BLD-MCNC: 105 MG/DL (ref 70–99)
PHOSPHORUS: 3.8 MG/DL (ref 2.5–4.9)
POTASSIUM SERPL-SCNC: 3.8 MMOL/L (ref 3.5–5.1)
SODIUM BLD-SCNC: 138 MMOL/L (ref 136–145)

## 2022-12-05 ENCOUNTER — OFFICE VISIT (OUTPATIENT)
Dept: INTERNAL MEDICINE CLINIC | Age: 57
End: 2022-12-05
Payer: COMMERCIAL

## 2022-12-05 VITALS
DIASTOLIC BLOOD PRESSURE: 78 MMHG | SYSTOLIC BLOOD PRESSURE: 128 MMHG | BODY MASS INDEX: 27.63 KG/M2 | OXYGEN SATURATION: 98 % | HEART RATE: 80 BPM | WEIGHT: 215.2 LBS

## 2022-12-05 DIAGNOSIS — E78.1 HYPERTRIGLYCERIDEMIA: ICD-10-CM

## 2022-12-05 DIAGNOSIS — I10 ESSENTIAL HYPERTENSION: ICD-10-CM

## 2022-12-05 DIAGNOSIS — E11.9 TYPE 2 DIABETES MELLITUS WITHOUT COMPLICATION, WITHOUT LONG-TERM CURRENT USE OF INSULIN (HCC): Primary | ICD-10-CM

## 2022-12-05 DIAGNOSIS — R74.01 TRANSAMINITIS: ICD-10-CM

## 2022-12-05 DIAGNOSIS — E78.5 HYPERLIPIDEMIA, UNSPECIFIED HYPERLIPIDEMIA TYPE: ICD-10-CM

## 2022-12-05 DIAGNOSIS — R13.10 DYSPHAGIA, UNSPECIFIED TYPE: ICD-10-CM

## 2022-12-05 DIAGNOSIS — Z12.5 SCREENING PSA (PROSTATE SPECIFIC ANTIGEN): ICD-10-CM

## 2022-12-05 PROCEDURE — 3078F DIAST BP <80 MM HG: CPT | Performed by: INTERNAL MEDICINE

## 2022-12-05 PROCEDURE — 3074F SYST BP LT 130 MM HG: CPT | Performed by: INTERNAL MEDICINE

## 2022-12-05 PROCEDURE — 99214 OFFICE O/P EST MOD 30 MIN: CPT | Performed by: INTERNAL MEDICINE

## 2022-12-05 RX ORDER — OMEPRAZOLE 40 MG/1
CAPSULE, DELAYED RELEASE ORAL
Qty: 90 CAPSULE | Refills: 1 | Status: SHIPPED | OUTPATIENT
Start: 2022-12-05

## 2022-12-05 RX ORDER — ATENOLOL 50 MG/1
50 TABLET ORAL DAILY
Qty: 90 TABLET | Refills: 1 | Status: SHIPPED | OUTPATIENT
Start: 2022-12-05

## 2022-12-05 RX ORDER — NIFEDIPINE 90 MG/1
TABLET, EXTENDED RELEASE ORAL
Qty: 90 TABLET | Refills: 1 | Status: SHIPPED | OUTPATIENT
Start: 2022-12-05

## 2022-12-05 RX ORDER — ROSUVASTATIN CALCIUM 10 MG/1
TABLET, COATED ORAL
Qty: 90 TABLET | Refills: 1 | Status: SHIPPED | OUTPATIENT
Start: 2022-12-05

## 2022-12-05 ASSESSMENT — ENCOUNTER SYMPTOMS: SHORTNESS OF BREATH: 0

## 2022-12-05 NOTE — PROGRESS NOTES
Jaime Guillen (:  1965) is a 56 y.o. male, here for evaluation of the following chief complaint(s):    Follow-up (Blood pressure and blood work /) and Numbness (In hands and sometimes in the both feet )      ASSESSMENT/PLAN:  1. Type 2 diabetes mellitus without complication, without long-term current use of insulin (HCC)  Generally patient has been diet controlled but with weight gain and increased numbness in hands, will check for change in A1c  -     Lipid Panel; Future  -     Comprehensive Metabolic Panel; Future  -     Hemoglobin A1C; Future  2. Essential hypertension  Well-controlled on atenolol and Procardia which we will continue.  -     NIFEdipine (PROCARDIA XL) 90 MG extended release tablet; TAKE ONE TABLET BY MOUTH DAILY, Disp-90 tablet, R-1Normal  -     rosuvastatin (CRESTOR) 10 MG tablet; TAKE ONE TABLET BY MOUTH DAILY, Disp-90 tablet, R-1Normal  3. Hyperlipidemia, unspecified hyperlipidemia type  Stable on rosuvastatin  4. Dysphagia, unspecified type  Symptoms are improved on Prilosec  -     omeprazole (PRILOSEC) 40 MG delayed release capsule; TAKE ONE CAPSULE BY MOUTH EVERY MORNING BEFORE BREAKFAST AS NEEDED FOR REFLUX, Disp-90 capsule, R-1Normal  -     Magnesium; Future  5. Screening PSA (prostate specific antigen)  -     PSA Screening; Future     Return in about 6 months (around 2023). SUBJECTIVE/OBJECTIVE:  HPI  Patient is here for routine visit. He needs paperwork completed for his job. He has noted some occasional tingling in his hands and they \"seize up\" when he is at the target range. It is not a major concern of his. We discussed having him see the hand orthopedist if symptoms persist or worsen. Review of Systems   Constitutional:  Positive for unexpected weight change. Respiratory:  Negative for shortness of breath. Cardiovascular:  Negative for chest pain.      Past Medical History:   Diagnosis Date    Acute renal failure (ARF) ([facility] [address] 2021    hematuria, clarification is needed please contact the office at [phone]          An electronic signature was used to authenticate this note.     --Isak Kat MD

## 2022-12-06 DIAGNOSIS — R13.10 DYSPHAGIA, UNSPECIFIED TYPE: ICD-10-CM

## 2022-12-06 DIAGNOSIS — Z12.5 SCREENING PSA (PROSTATE SPECIFIC ANTIGEN): ICD-10-CM

## 2022-12-06 DIAGNOSIS — E11.9 TYPE 2 DIABETES MELLITUS WITHOUT COMPLICATION, WITHOUT LONG-TERM CURRENT USE OF INSULIN (HCC): ICD-10-CM

## 2022-12-06 LAB
A/G RATIO: 1.2 (ref 1.1–2.2)
ALBUMIN SERPL-MCNC: 4.2 G/DL (ref 3.4–5)
ALP BLD-CCNC: 78 U/L (ref 40–129)
ALT SERPL-CCNC: 111 U/L (ref 10–40)
ANION GAP SERPL CALCULATED.3IONS-SCNC: 14 MMOL/L (ref 3–16)
AST SERPL-CCNC: 137 U/L (ref 15–37)
BILIRUB SERPL-MCNC: 0.4 MG/DL (ref 0–1)
BUN BLDV-MCNC: 17 MG/DL (ref 7–20)
CALCIUM SERPL-MCNC: 10.1 MG/DL (ref 8.3–10.6)
CHLORIDE BLD-SCNC: 99 MMOL/L (ref 99–110)
CHOLESTEROL, TOTAL: 142 MG/DL (ref 0–199)
CO2: 25 MMOL/L (ref 21–32)
CREAT SERPL-MCNC: 1 MG/DL (ref 0.9–1.3)
ESTIMATED AVERAGE GLUCOSE: 125.5 MG/DL
GFR SERPL CREATININE-BSD FRML MDRD: >60 ML/MIN/{1.73_M2}
GLUCOSE BLD-MCNC: 125 MG/DL (ref 70–99)
HBA1C MFR BLD: 6 %
HDLC SERPL-MCNC: 60 MG/DL (ref 40–60)
LDL CHOLESTEROL CALCULATED: ABNORMAL MG/DL
LDL CHOLESTEROL DIRECT: 16 MG/DL
MAGNESIUM: 2 MG/DL (ref 1.8–2.4)
POTASSIUM SERPL-SCNC: 3.9 MMOL/L (ref 3.5–5.1)
PROSTATE SPECIFIC ANTIGEN: 0.32 NG/ML (ref 0–4)
SODIUM BLD-SCNC: 138 MMOL/L (ref 136–145)
TOTAL PROTEIN: 7.8 G/DL (ref 6.4–8.2)
TRIGL SERPL-MCNC: 595 MG/DL (ref 0–150)
VLDLC SERPL CALC-MCNC: ABNORMAL MG/DL

## 2022-12-19 DIAGNOSIS — R74.01 TRANSAMINITIS: ICD-10-CM

## 2022-12-19 DIAGNOSIS — E78.1 HYPERTRIGLYCERIDEMIA: ICD-10-CM

## 2022-12-20 LAB
ALBUMIN SERPL-MCNC: 4.4 G/DL (ref 3.4–5)
ALP BLD-CCNC: 70 U/L (ref 40–129)
ALT SERPL-CCNC: 100 U/L (ref 10–40)
AST SERPL-CCNC: 109 U/L (ref 15–37)
BILIRUB SERPL-MCNC: 0.3 MG/DL (ref 0–1)
BILIRUBIN DIRECT: <0.2 MG/DL (ref 0–0.3)
BILIRUBIN, INDIRECT: ABNORMAL MG/DL (ref 0–1)
CHOLESTEROL, TOTAL: 159 MG/DL (ref 0–199)
HDLC SERPL-MCNC: 81 MG/DL (ref 40–60)
LDL CHOLESTEROL CALCULATED: ABNORMAL MG/DL
LDL CHOLESTEROL DIRECT: 27 MG/DL
TOTAL PROTEIN: 8.1 G/DL (ref 6.4–8.2)
TRIGL SERPL-MCNC: 323 MG/DL (ref 0–150)
VLDLC SERPL CALC-MCNC: ABNORMAL MG/DL

## 2023-05-01 DIAGNOSIS — E78.5 HYPERLIPIDEMIA, UNSPECIFIED HYPERLIPIDEMIA TYPE: ICD-10-CM

## 2023-05-01 RX ORDER — FENOFIBRATE 145 MG/1
TABLET, COATED ORAL
Qty: 30 TABLET | Refills: 3 | Status: SHIPPED | OUTPATIENT
Start: 2023-05-01

## 2023-06-02 DIAGNOSIS — R13.10 DYSPHAGIA, UNSPECIFIED TYPE: ICD-10-CM

## 2023-06-02 RX ORDER — OMEPRAZOLE 40 MG/1
CAPSULE, DELAYED RELEASE ORAL
Qty: 90 CAPSULE | Refills: 1 | Status: SHIPPED | OUTPATIENT
Start: 2023-06-02

## 2023-06-05 ENCOUNTER — OFFICE VISIT (OUTPATIENT)
Dept: INTERNAL MEDICINE CLINIC | Age: 58
End: 2023-06-05
Payer: COMMERCIAL

## 2023-06-05 VITALS
WEIGHT: 224.8 LBS | DIASTOLIC BLOOD PRESSURE: 78 MMHG | OXYGEN SATURATION: 96 % | SYSTOLIC BLOOD PRESSURE: 128 MMHG | HEIGHT: 74 IN | BODY MASS INDEX: 28.85 KG/M2 | TEMPERATURE: 97.9 F | HEART RATE: 70 BPM

## 2023-06-05 DIAGNOSIS — E11.9 TYPE 2 DIABETES MELLITUS WITHOUT COMPLICATION, WITHOUT LONG-TERM CURRENT USE OF INSULIN (HCC): ICD-10-CM

## 2023-06-05 DIAGNOSIS — I10 ESSENTIAL HYPERTENSION: ICD-10-CM

## 2023-06-05 DIAGNOSIS — K21.00 GASTROESOPHAGEAL REFLUX DISEASE WITH ESOPHAGITIS WITHOUT HEMORRHAGE: ICD-10-CM

## 2023-06-05 DIAGNOSIS — E78.5 HYPERLIPIDEMIA, UNSPECIFIED HYPERLIPIDEMIA TYPE: Primary | ICD-10-CM

## 2023-06-05 PROCEDURE — 3074F SYST BP LT 130 MM HG: CPT | Performed by: INTERNAL MEDICINE

## 2023-06-05 PROCEDURE — 3078F DIAST BP <80 MM HG: CPT | Performed by: INTERNAL MEDICINE

## 2023-06-05 PROCEDURE — 99214 OFFICE O/P EST MOD 30 MIN: CPT | Performed by: INTERNAL MEDICINE

## 2023-06-05 SDOH — ECONOMIC STABILITY: FOOD INSECURITY: WITHIN THE PAST 12 MONTHS, THE FOOD YOU BOUGHT JUST DIDN'T LAST AND YOU DIDN'T HAVE MONEY TO GET MORE.: NEVER TRUE

## 2023-06-05 SDOH — ECONOMIC STABILITY: FOOD INSECURITY: WITHIN THE PAST 12 MONTHS, YOU WORRIED THAT YOUR FOOD WOULD RUN OUT BEFORE YOU GOT MONEY TO BUY MORE.: NEVER TRUE

## 2023-06-05 SDOH — ECONOMIC STABILITY: INCOME INSECURITY: HOW HARD IS IT FOR YOU TO PAY FOR THE VERY BASICS LIKE FOOD, HOUSING, MEDICAL CARE, AND HEATING?: NOT HARD AT ALL

## 2023-06-05 SDOH — ECONOMIC STABILITY: HOUSING INSECURITY
IN THE LAST 12 MONTHS, WAS THERE A TIME WHEN YOU DID NOT HAVE A STEADY PLACE TO SLEEP OR SLEPT IN A SHELTER (INCLUDING NOW)?: NO

## 2023-06-05 SDOH — ECONOMIC STABILITY: TRANSPORTATION INSECURITY
IN THE PAST 12 MONTHS, HAS LACK OF TRANSPORTATION KEPT YOU FROM MEETINGS, WORK, OR FROM GETTING THINGS NEEDED FOR DAILY LIVING?: NO

## 2023-06-05 ASSESSMENT — PATIENT HEALTH QUESTIONNAIRE - PHQ9
SUM OF ALL RESPONSES TO PHQ QUESTIONS 1-9: 0
1. LITTLE INTEREST OR PLEASURE IN DOING THINGS: 0
SUM OF ALL RESPONSES TO PHQ QUESTIONS 1-9: 0
SUM OF ALL RESPONSES TO PHQ9 QUESTIONS 1 & 2: 0
SUM OF ALL RESPONSES TO PHQ QUESTIONS 1-9: 0
SUM OF ALL RESPONSES TO PHQ QUESTIONS 1-9: 0
2. FEELING DOWN, DEPRESSED OR HOPELESS: 0

## 2023-06-05 NOTE — PROGRESS NOTES
Homer Mora (:  1965) is a 66 y.o. male, here for evaluation of the following chief complaint(s):    6 Month Follow-Up (HTN/No concerns voiced)      ASSESSMENT/PLAN:  1. Hyperlipidemia, unspecified hyperlipidemia type  -     Stable on Tricor, off of Crestor, as per liver specialist  2. Type 2 diabetes mellitus without complication, without long-term current use of insulin ([address]  Generally patient is diet controlled  -     Lipid Panel; Future  -     Diabetic Foot Exam  3. Essential hypertension  Controlled on Tenormin and Procardia which we will continue  -     Comprehensive Metabolic Panel; Future  4. Gastroesophageal reflux disease with esophagitis without hemorrhage  Controlled on omeprazole which we will continue  -     Magnesium; Future    BPH   Controlled on Flomax which we will continue     Return in about 6 months (around 2023). SUBJECTIVE/OBJECTIVE:  HPI  Patient is here for routine follow-up. He has no new complaints. He did see the liver specialist-. 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 Review of Systems   Constitutional:  Positive for unexpected weight change. Negative for fatigue and fever. HENT:  Negative for rhinorrhea, sore throat and trouble swallowing. Eyes:  Negative for visual disturbance. Respiratory:  Negative for cough and shortness of breath. Cardiovascular:  Negative for chest pain and palpitations. Gastrointestinal:  Negative for abdominal pain, diarrhea and nausea. Genitourinary:  Negative for decreased urine volume, dysuria and frequency. Musculoskeletal:  Negative for arthralgias and myalgias. Skin:  Negative for rash. Allergic/Immunologic: Negative for immunocompromised state. Neurological:  Negative for dizziness, numbness and headaches. Hematological:  Does not bruise/bleed easily.    Psychiatric/Behavioral:  Negative for dysphoric mood and sleep

## 2023-06-06 ASSESSMENT — ENCOUNTER SYMPTOMS
RHINORRHEA: 0
COUGH: 0
SORE THROAT: 0
TROUBLE SWALLOWING: 0
DIARRHEA: 0
SHORTNESS OF BREATH: 0
NAUSEA: 0
ABDOMINAL PAIN: 0

## 2023-06-07 RX ORDER — ATENOLOL 50 MG/1
TABLET ORAL
Qty: 90 TABLET | Refills: 1 | Status: SHIPPED | OUTPATIENT
Start: 2023-06-07

## 2023-06-07 NOTE — TELEPHONE ENCOUNTER
Last appointment: 6/5/2023  Next appointment: Visit date not found  Last refill: 12/5/2022  Appointment not due for >6 months.

## 2023-06-08 DIAGNOSIS — E78.5 HYPERLIPIDEMIA, UNSPECIFIED HYPERLIPIDEMIA TYPE: ICD-10-CM

## 2023-06-08 DIAGNOSIS — I10 ESSENTIAL HYPERTENSION: ICD-10-CM

## 2023-06-08 DIAGNOSIS — K21.00 GASTROESOPHAGEAL REFLUX DISEASE WITH ESOPHAGITIS WITHOUT HEMORRHAGE: ICD-10-CM

## 2023-06-08 DIAGNOSIS — E11.9 TYPE 2 DIABETES MELLITUS WITHOUT COMPLICATION, WITHOUT LONG-TERM CURRENT USE OF INSULIN (HCC): ICD-10-CM

## 2023-06-09 LAB
ALBUMIN SERPL-MCNC: 4.6 G/DL (ref 3.4–5)
ALBUMIN/GLOB SERPL: 1.4 {RATIO} (ref 1.1–2.2)
ALP SERPL-CCNC: 42 U/L (ref 40–129)
ALT SERPL-CCNC: 30 U/L (ref 10–40)
ANION GAP SERPL CALCULATED.3IONS-SCNC: 13 MMOL/L (ref 3–16)
AST SERPL-CCNC: 29 U/L (ref 15–37)
BILIRUB SERPL-MCNC: 0.5 MG/DL (ref 0–1)
BUN SERPL-MCNC: 13 MG/DL (ref 7–20)
CALCIUM SERPL-MCNC: 10.2 MG/DL (ref 8.3–10.6)
CHLORIDE SERPL-SCNC: 98 MMOL/L (ref 99–110)
CHOLEST SERPL-MCNC: 187 MG/DL (ref 0–199)
CO2 SERPL-SCNC: 26 MMOL/L (ref 21–32)
CREAT SERPL-MCNC: 1 MG/DL (ref 0.9–1.3)
EST. AVERAGE GLUCOSE BLD GHB EST-MCNC: 137 MG/DL
GFR SERPLBLD CREATININE-BSD FMLA CKD-EPI: >60 ML/MIN/{1.73_M2}
GLUCOSE SERPL-MCNC: 125 MG/DL (ref 70–99)
HBA1C MFR BLD: 6.4 %
HDLC SERPL-MCNC: 72 MG/DL (ref 40–60)
LDLC SERPL CALC-MCNC: 67 MG/DL
MAGNESIUM SERPL-MCNC: 2 MG/DL (ref 1.8–2.4)
POTASSIUM SERPL-SCNC: 3.7 MMOL/L (ref 3.5–5.1)
PROT SERPL-MCNC: 7.9 G/DL (ref 6.4–8.2)
SODIUM SERPL-SCNC: 137 MMOL/L (ref 136–145)
TRIGL SERPL-MCNC: 238 MG/DL (ref 0–150)
VLDLC SERPL CALC-MCNC: 48 MG/DL

## 2023-07-17 DIAGNOSIS — I10 ESSENTIAL HYPERTENSION: ICD-10-CM

## 2023-07-17 RX ORDER — NIFEDIPINE 90 MG/1
TABLET, EXTENDED RELEASE ORAL
Qty: 90 TABLET | Refills: 1 | Status: SHIPPED | OUTPATIENT
Start: 2023-07-17

## 2023-07-17 NOTE — TELEPHONE ENCOUNTER
Requested Prescriptions     Pending Prescriptions Disp Refills    NIFEdipine (PROCARDIA XL) 90 MG extended release tablet 90 tablet 1     Sig: TAKE ONE TABLET BY MOUTH DAILY     Last OV - 6/5/23  Next OV - none  Last refill - 12/5/22  Last labs - 6/8/23    [facility] requesting 90 day supply

## 2023-08-27 DIAGNOSIS — E78.5 HYPERLIPIDEMIA, UNSPECIFIED HYPERLIPIDEMIA TYPE: ICD-10-CM

## 2023-08-28 RX ORDER — FENOFIBRATE 145 MG/1
TABLET, COATED ORAL
Qty: 90 TABLET | Refills: 1 | Status: SHIPPED | OUTPATIENT
Start: 2023-08-28

## 2023-08-28 NOTE — TELEPHONE ENCOUNTER
Last appointment: 6/5/2023  Next appointment: Visit date not found  Last refill: 5/1/2023  #30 x3  Sent [employer] message to schedule next appointment due in December.

## 2023-12-03 DIAGNOSIS — R13.10 DYSPHAGIA, UNSPECIFIED TYPE: ICD-10-CM

## 2023-12-04 RX ORDER — OMEPRAZOLE 40 MG/1
CAPSULE, DELAYED RELEASE ORAL
Qty: 90 CAPSULE | Refills: 0 | Status: SHIPPED | OUTPATIENT
Start: 2023-12-04

## 2023-12-04 NOTE — TELEPHONE ENCOUNTER
Last appointment: 6/5/2023  Next appointment: Visit date not found  Last refill: 9/8/23  Sent [email or web address] message to schedule due/overdue appointment.

## 2023-12-05 DIAGNOSIS — R13.10 DYSPHAGIA, UNSPECIFIED TYPE: ICD-10-CM

## 2023-12-05 RX ORDER — OMEPRAZOLE 40 MG/1
CAPSULE, DELAYED RELEASE ORAL
Qty: 90 CAPSULE | Refills: 0 | OUTPATIENT
Start: 2023-12-05

## 2023-12-05 RX ORDER — ATENOLOL 50 MG/1
50 TABLET ORAL DAILY
Qty: 90 TABLET | Refills: 1 | Status: SHIPPED | OUTPATIENT
Start: 2023-12-05

## 2023-12-11 ENCOUNTER — OFFICE VISIT (OUTPATIENT)
Dept: INTERNAL MEDICINE CLINIC | Age: 58
End: 2023-12-11
Payer: COMMERCIAL

## 2023-12-11 VITALS
WEIGHT: 224 LBS | OXYGEN SATURATION: 97 % | SYSTOLIC BLOOD PRESSURE: 132 MMHG | BODY MASS INDEX: 28.75 KG/M2 | HEIGHT: 74 IN | HEART RATE: 67 BPM | DIASTOLIC BLOOD PRESSURE: 80 MMHG

## 2023-12-11 DIAGNOSIS — R13.10 DYSPHAGIA, UNSPECIFIED TYPE: ICD-10-CM

## 2023-12-11 DIAGNOSIS — E78.1 HYPERTRIGLYCERIDEMIA: ICD-10-CM

## 2023-12-11 DIAGNOSIS — I10 ESSENTIAL HYPERTENSION: ICD-10-CM

## 2023-12-11 DIAGNOSIS — E11.9 TYPE 2 DIABETES MELLITUS WITHOUT COMPLICATION, WITHOUT LONG-TERM CURRENT USE OF INSULIN (HCC): ICD-10-CM

## 2023-12-11 DIAGNOSIS — R77.8 ELEVATED TOTAL PROTEIN: ICD-10-CM

## 2023-12-11 DIAGNOSIS — Z12.5 SCREENING PSA (PROSTATE SPECIFIC ANTIGEN): ICD-10-CM

## 2023-12-11 DIAGNOSIS — E11.9 TYPE 2 DIABETES MELLITUS WITHOUT COMPLICATION, WITHOUT LONG-TERM CURRENT USE OF INSULIN (HCC): Primary | ICD-10-CM

## 2023-12-11 LAB
CREAT UR-MCNC: 125.4 MG/DL (ref 39–259)
MICROALBUMIN UR DL<=1MG/L-MCNC: 9.6 MG/DL
MICROALBUMIN/CREAT UR: 76.6 MG/G (ref 0–30)

## 2023-12-11 PROCEDURE — 3075F SYST BP GE 130 - 139MM HG: CPT | Performed by: INTERNAL MEDICINE

## 2023-12-11 PROCEDURE — 3079F DIAST BP 80-89 MM HG: CPT | Performed by: INTERNAL MEDICINE

## 2023-12-11 PROCEDURE — 90674 CCIIV4 VAC NO PRSV 0.5 ML IM: CPT | Performed by: INTERNAL MEDICINE

## 2023-12-11 PROCEDURE — 3044F HG A1C LEVEL LT 7.0%: CPT | Performed by: INTERNAL MEDICINE

## 2023-12-11 PROCEDURE — 90471 IMMUNIZATION ADMIN: CPT | Performed by: INTERNAL MEDICINE

## 2023-12-11 RX ORDER — OMEPRAZOLE 20 MG/1
CAPSULE, DELAYED RELEASE ORAL
Qty: 90 CAPSULE | Refills: 0 | Status: SHIPPED | OUTPATIENT
Start: 2023-12-11

## 2023-12-11 NOTE — PROGRESS NOTES
capsule 0    atenolol (TENORMIN) 50 MG tablet TAKE 1 TABLET BY MOUTH DAILY 90 tablet 1    tamsulosin (FLOMAX) 0.4 MG capsule Take 1 capsule by mouth every morning 90 capsule 1    fenofibrate (TRICOR) 145 MG tablet TAKE ONE TABLET BY MOUTH DAILY 90 tablet 1    NIFEdipine (PROCARDIA XL) 90 MG extended release tablet TAKE ONE TABLET BY MOUTH DAILY 90 tablet 1    Multiple Vitamins-Minerals (THERAPEUTIC MULTIVITAMIN-MINERALS) tablet Take 1 tablet by mouth daily       No current facility-administered medications for this visit. Physical Exam  Vitals and nursing note reviewed. Constitutional:       General: He is not in acute distress. Appearance: He is well-developed. HENT:      Head: Normocephalic and atraumatic. Right Ear: External ear normal.      Left Ear: External ear normal.      Nose: Nose normal.   Eyes:      General: No scleral icterus. Pupils: Pupils are equal, round, and reactive to light. Neck:      Thyroid: No thyromegaly. Cardiovascular:      Rate and Rhythm: Normal rate and regular rhythm. Heart sounds: No murmur heard. Pulmonary:      Effort: No respiratory distress. Breath sounds: Normal breath sounds. No wheezing or rales. Abdominal:      General: Bowel sounds are normal. There is no distension. Palpations: Abdomen is soft. Tenderness: There is no abdominal tenderness. Musculoskeletal:         General: No deformity. Normal range of motion. Cervical back: Normal range of motion. Right lower leg: No edema. Left lower leg: No edema. Lymphadenopathy:      Cervical: No cervical adenopathy. Skin:     General: Skin is warm and dry. Neurological:      Mental Status: He is alert and oriented to person, place, and time. Cranial Nerves: No cranial nerve deficit. Sensory: No sensory deficit. Coordination: Coordination normal.   Psychiatric:         Thought Content:  Thought content normal.     128/70          This note was

## 2023-12-12 LAB
ALBUMIN SERPL-MCNC: 5.1 G/DL (ref 3.4–5)
ALBUMIN/GLOB SERPL: 1.6 {RATIO} (ref 1.1–2.2)
ALP SERPL-CCNC: 42 U/L (ref 40–129)
ALT SERPL-CCNC: 23 U/L (ref 10–40)
ANION GAP SERPL CALCULATED.3IONS-SCNC: 15 MMOL/L (ref 3–16)
AST SERPL-CCNC: 24 U/L (ref 15–37)
BILIRUB SERPL-MCNC: 0.4 MG/DL (ref 0–1)
BUN SERPL-MCNC: 17 MG/DL (ref 7–20)
CALCIUM SERPL-MCNC: 10.2 MG/DL (ref 8.3–10.6)
CHLORIDE SERPL-SCNC: 102 MMOL/L (ref 99–110)
CHOLEST SERPL-MCNC: 183 MG/DL (ref 0–199)
CO2 SERPL-SCNC: 26 MMOL/L (ref 21–32)
CREAT SERPL-MCNC: 1 MG/DL (ref 0.9–1.3)
EST. AVERAGE GLUCOSE BLD GHB EST-MCNC: 137 MG/DL
GFR SERPLBLD CREATININE-BSD FMLA CKD-EPI: >60 ML/MIN/{1.73_M2}
GLUCOSE SERPL-MCNC: 119 MG/DL (ref 70–99)
HBA1C MFR BLD: 6.4 %
HDLC SERPL-MCNC: 80 MG/DL (ref 40–60)
LDLC SERPL CALC-MCNC: 83 MG/DL
MAGNESIUM SERPL-MCNC: 1.9 MG/DL (ref 1.8–2.4)
POTASSIUM SERPL-SCNC: 3.8 MMOL/L (ref 3.5–5.1)
PROT SERPL-MCNC: 8.3 G/DL (ref 6.4–8.2)
PSA SERPL DL<=0.01 NG/ML-MCNC: 0.97 NG/ML (ref 0–4)
SODIUM SERPL-SCNC: 143 MMOL/L (ref 136–145)
TRIGL SERPL-MCNC: 98 MG/DL (ref 0–150)
VLDLC SERPL CALC-MCNC: 20 MG/DL

## 2024-01-05 DIAGNOSIS — R77.8 ELEVATED TOTAL PROTEIN: ICD-10-CM

## 2024-01-08 LAB
ALBUMIN SERPL ELPH-MCNC: 3.7 G/DL (ref 3.1–4.9)
ALPHA1 GLOB SERPL ELPH-MCNC: 0.2 G/DL (ref 0.2–0.4)
ALPHA2 GLOB SERPL ELPH-MCNC: 0.8 G/DL (ref 0.4–1.1)
B-GLOBULIN SERPL ELPH-MCNC: 1.5 G/DL (ref 0.9–1.6)
GAMMA GLOB SERPL ELPH-MCNC: 1.8 G/DL (ref 0.6–1.8)
PROT SERPL-MCNC: 8 G/DL (ref 6.4–8.2)
SPE/IFE INTERPRETATION: NORMAL

## 2024-02-09 DIAGNOSIS — I10 ESSENTIAL HYPERTENSION: ICD-10-CM

## 2024-02-09 RX ORDER — NIFEDIPINE 90 MG/1
TABLET, EXTENDED RELEASE ORAL
Qty: 90 TABLET | Refills: 1 | Status: SHIPPED | OUTPATIENT
Start: 2024-02-09

## 2024-02-09 NOTE — TELEPHONE ENCOUNTER
Refill Request     Last Seen: 12/11/2023    Last Written: 7/17/23      Next Appointment:   No future appointments.          Requested Prescriptions     Pending Prescriptions Disp Refills    NIFEdipine (PROCARDIA XL) 90 MG extended release tablet [Pharmacy Med Name: NIFEdipine ER 90 MG TAB, 24 HR] 90 tablet 1     Sig: TAKE 1 TABLET BY MOUTH DAILY

## 2024-02-27 DIAGNOSIS — E78.5 HYPERLIPIDEMIA, UNSPECIFIED HYPERLIPIDEMIA TYPE: ICD-10-CM

## 2024-02-27 RX ORDER — FENOFIBRATE 145 MG/1
145 TABLET, COATED ORAL DAILY
Qty: 90 TABLET | Refills: 1 | Status: SHIPPED | OUTPATIENT
Start: 2024-02-27

## 2024-02-27 NOTE — TELEPHONE ENCOUNTER
Refill Request     Last Seen: 12/11/2023    Last Written: 8/28/23    Next Appointment:   No future appointments.          Requested Prescriptions     Pending Prescriptions Disp Refills    fenofibrate (TRICOR) 145 MG tablet [Pharmacy Med Name: FENOFIBRATE 145 MG TABLET] 90 tablet 1     Sig: TAKE 1 TABLET BY MOUTH DAILY

## 2024-03-01 DIAGNOSIS — R13.10 DYSPHAGIA, UNSPECIFIED TYPE: ICD-10-CM

## 2024-03-01 RX ORDER — OMEPRAZOLE 40 MG/1
CAPSULE, DELAYED RELEASE ORAL
Qty: 90 CAPSULE | Refills: 0 | OUTPATIENT
Start: 2024-03-01

## 2024-03-01 NOTE — TELEPHONE ENCOUNTER
Last appointment: 12/11/2023  Next appointment: Visit date not found  Last refill: 12/11/2023      Chart notes demonstrate patient is aware he needs to be seen in June.

## 2024-03-03 RX ORDER — OMEPRAZOLE 20 MG/1
CAPSULE, DELAYED RELEASE ORAL
Qty: 90 CAPSULE | Refills: 0 | Status: SHIPPED | OUTPATIENT
Start: 2024-03-03

## 2024-03-18 DIAGNOSIS — R13.10 DYSPHAGIA, UNSPECIFIED TYPE: ICD-10-CM

## 2024-03-20 RX ORDER — OMEPRAZOLE 20 MG/1
CAPSULE, DELAYED RELEASE ORAL
Qty: 90 CAPSULE | Refills: 0 | OUTPATIENT
Start: 2024-03-20

## 2024-03-20 NOTE — TELEPHONE ENCOUNTER
Medication:   Requested Prescriptions     Pending Prescriptions Disp Refills    omeprazole (PRILOSEC) 20 MG delayed release capsule [Pharmacy Med Name: OMEPRAZOLE DR 20 MG CAPSULE] 90 capsule 0     Sig: TAKE ONE CAPSULE BY MOUTH EVERY MORNING BEFORE BREAKFAST AS NEEDED FOR REFLUX     Last Filled:  3/3/24    Last appt: 12/11/2023   Next appt: Visit date not found    Last OARRS:        No data to display

## 2024-04-22 RX ORDER — TAMSULOSIN HYDROCHLORIDE 0.4 MG/1
0.4 CAPSULE ORAL EVERY MORNING
Qty: 90 CAPSULE | Refills: 1 | Status: SHIPPED | OUTPATIENT
Start: 2024-04-22

## 2024-04-22 NOTE — TELEPHONE ENCOUNTER
Patient is calling in for  in regards to medication refill needed from a previous MD (nephrology) whom he no longer sees. Patient is requesting medication refill for:    tamsulosin (FLOMAX) 0.4 MG capsule   Last appointment: 12/11/2023  Next appointment: will call back to schedule 6 month OV for June   Last refill: 9/8/23      Please send medication refill to :    [facility] [id number] [facility] [address] - P [phone] - F [phone]  [address] [address]  Phone: [phone]  Fax: [phone]

## 2024-06-02 DIAGNOSIS — R13.10 DYSPHAGIA, UNSPECIFIED TYPE: ICD-10-CM

## 2024-06-03 RX ORDER — OMEPRAZOLE 20 MG/1
CAPSULE, DELAYED RELEASE ORAL
Qty: 90 CAPSULE | Refills: 0 | Status: SHIPPED | OUTPATIENT
Start: 2024-06-03

## 2024-06-03 RX ORDER — ATENOLOL 50 MG/1
50 TABLET ORAL DAILY
Qty: 90 TABLET | Refills: 1 | Status: SHIPPED | OUTPATIENT
Start: 2024-06-03

## 2024-07-03 ENCOUNTER — OFFICE VISIT (OUTPATIENT)
Dept: INTERNAL MEDICINE CLINIC | Age: 59
End: 2024-07-03
Payer: COMMERCIAL

## 2024-07-03 VITALS
SYSTOLIC BLOOD PRESSURE: 126 MMHG | BODY MASS INDEX: 27.78 KG/M2 | WEIGHT: 216.4 LBS | DIASTOLIC BLOOD PRESSURE: 80 MMHG | HEART RATE: 55 BPM | OXYGEN SATURATION: 99 %

## 2024-07-03 DIAGNOSIS — I10 ESSENTIAL HYPERTENSION: ICD-10-CM

## 2024-07-03 DIAGNOSIS — E78.5 HYPERLIPIDEMIA, UNSPECIFIED HYPERLIPIDEMIA TYPE: ICD-10-CM

## 2024-07-03 DIAGNOSIS — Z71.89 ACP (ADVANCE CARE PLANNING): ICD-10-CM

## 2024-07-03 DIAGNOSIS — Z00.00 ENCOUNTER FOR WELL ADULT EXAM WITHOUT ABNORMAL FINDINGS: Primary | ICD-10-CM

## 2024-07-03 DIAGNOSIS — E11.9 TYPE 2 DIABETES MELLITUS WITHOUT COMPLICATION, WITHOUT LONG-TERM CURRENT USE OF INSULIN (HCC): ICD-10-CM

## 2024-07-03 DIAGNOSIS — E56.9 VITAMIN DEFICIENCY: ICD-10-CM

## 2024-07-03 PROCEDURE — 3079F DIAST BP 80-89 MM HG: CPT | Performed by: INTERNAL MEDICINE

## 2024-07-03 PROCEDURE — 99396 PREV VISIT EST AGE 40-64: CPT | Performed by: INTERNAL MEDICINE

## 2024-07-03 PROCEDURE — 3074F SYST BP LT 130 MM HG: CPT | Performed by: INTERNAL MEDICINE

## 2024-07-03 RX ORDER — CEPHALEXIN 500 MG/1
500 CAPSULE ORAL 3 TIMES DAILY
Qty: 21 CAPSULE | Refills: 0 | Status: SHIPPED | OUTPATIENT
Start: 2024-07-03 | End: 2024-07-10

## 2024-07-03 SDOH — ECONOMIC STABILITY: FOOD INSECURITY: WITHIN THE PAST 12 MONTHS, YOU WORRIED THAT YOUR FOOD WOULD RUN OUT BEFORE YOU GOT MONEY TO BUY MORE.: NEVER TRUE

## 2024-07-03 SDOH — ECONOMIC STABILITY: INCOME INSECURITY: HOW HARD IS IT FOR YOU TO PAY FOR THE VERY BASICS LIKE FOOD, HOUSING, MEDICAL CARE, AND HEATING?: NOT HARD AT ALL

## 2024-07-03 SDOH — ECONOMIC STABILITY: FOOD INSECURITY: WITHIN THE PAST 12 MONTHS, THE FOOD YOU BOUGHT JUST DIDN'T LAST AND YOU DIDN'T HAVE MONEY TO GET MORE.: NEVER TRUE

## 2024-07-03 ASSESSMENT — PATIENT HEALTH QUESTIONNAIRE - PHQ9
SUM OF ALL RESPONSES TO PHQ QUESTIONS 1-9: 0
SUM OF ALL RESPONSES TO PHQ9 QUESTIONS 1 & 2: 0
2. FEELING DOWN, DEPRESSED OR HOPELESS: NOT AT ALL
1. LITTLE INTEREST OR PLEASURE IN DOING THINGS: NOT AT ALL
SUM OF ALL RESPONSES TO PHQ QUESTIONS 1-9: 0

## 2024-07-03 NOTE — PROGRESS NOTES
Prilosec daily.   --   BPH   Controlled on Flomax which we will continue  Right calf cellulitis  Keflex rx'ed      Personalized Preventive Plan   Current Health Maintenance Status  Immunization History   Administered Date(s) Administered    COVID-19, PFIZER PURPLE top, DILUTE for use, (age 12 y+), 30mcg/0.3mL 04/08/2021, 04/29/2021, 12/12/2021    Influenza Virus Vaccine 11/21/2018, 10/16/2019, 10/25/2022    Influenza, FLUCELVAX, (age 6 mo+), MDCK, PF, 0.5mL 12/11/2023    TDaP, ADACEL (age 10y-64y), BOOSTRIX (age 10y+), IM, 0.5mL 03/21/2011, 02/08/2019    Zoster Recombinant (Shingrix) 02/09/2018, 05/14/2018        Health Maintenance   Topic Date Due    Pneumococcal 0-64 years Vaccine (1 of 2 - PCV) Never done    Diabetic retinal exam  Never done    COVID-19 Vaccine (4 - 2023-24 season) 09/01/2023    Flu vaccine (1) 08/01/2024    Diabetic Alb to Cr ratio (uACR) test  12/11/2024    Diabetic foot exam  07/03/2025    A1C test (Diabetic or Prediabetic)  07/03/2025    Lipids  07/03/2025    Depression Screen  07/03/2025    GFR test (Diabetes, CKD 3-4, OR last GFR 15-59)  07/03/2025    DTaP/Tdap/Td vaccine (3 - Td or Tdap) 02/08/2029    Colorectal Cancer Screen  11/25/2029    Shingles vaccine  Completed    Hepatitis C screen  Completed    HIV screen  Completed    Hepatitis A vaccine  Aged Out    Hib vaccine  Aged Out    Polio vaccine  Aged Out    Meningococcal (ACWY) vaccine  Aged Out    Hepatitis B vaccine  Discontinued    Prostate Specific Antigen (PSA) Screening or Monitoring  Discontinued     Recommendations for Preventive Services Due: see orders and patient instructions/AVS.    Return in about 6 months (around 1/3/2025).      Advance Care Planning   Discussed the patient’s choices for care and treatment preferences in case of a health event that adversely affects decision-making abilities or is life-limiting. Recommended the patient document care preferences in state-specific advance directives. Also reviewed the

## 2024-07-03 NOTE — PATIENT INSTRUCTIONS
Diabetic retinal  --  Labs    Advance Care Planning     Advance Care Planning opens a door to talk about and write down your wishes before a sudden accident or illness.  Make your goals, values, and preferences known.     This puts you in the ’s seat and helps others know what matters most to you so they won’t have to guess.      Where can you learn more?    Go to [email or web address]   to learn how to:    Name someone you trust to make healthcare decisions for you, only if you can’t. (Healthcare Power of )    Document your wishes for care if you were seriously ill and not expected to recover or are approaching end of life. (Advance Directive or Living Will)    The same page can be found using the QR code below.

## 2024-07-04 LAB
25(OH)D3 SERPL-MCNC: 26.8 NG/ML
ALBUMIN SERPL-MCNC: 4.9 G/DL (ref 3.4–5)
ALBUMIN/GLOB SERPL: 1.4 {RATIO} (ref 1.1–2.2)
ALP SERPL-CCNC: 39 U/L (ref 40–129)
ALT SERPL-CCNC: 18 U/L (ref 10–40)
ANION GAP SERPL CALCULATED.3IONS-SCNC: 14 MMOL/L (ref 3–16)
AST SERPL-CCNC: 20 U/L (ref 15–37)
BILIRUB SERPL-MCNC: 0.5 MG/DL (ref 0–1)
BUN SERPL-MCNC: 21 MG/DL (ref 7–20)
CALCIUM SERPL-MCNC: 10.4 MG/DL (ref 8.3–10.6)
CHLORIDE SERPL-SCNC: 101 MMOL/L (ref 99–110)
CHOLEST SERPL-MCNC: 159 MG/DL (ref 0–199)
CO2 SERPL-SCNC: 24 MMOL/L (ref 21–32)
CREAT SERPL-MCNC: 1.2 MG/DL (ref 0.9–1.3)
EST. AVERAGE GLUCOSE BLD GHB EST-MCNC: 125.5 MG/DL
GFR SERPLBLD CREATININE-BSD FMLA CKD-EPI: 70 ML/MIN/{1.73_M2}
GLUCOSE P FAST SERPL-MCNC: 86 MG/DL (ref 70–99)
HBA1C MFR BLD: 6 %
HDLC SERPL-MCNC: 74 MG/DL (ref 40–60)
LDLC SERPL CALC-MCNC: 65 MG/DL
POTASSIUM SERPL-SCNC: 3.7 MMOL/L (ref 3.5–5.1)
PROT SERPL-MCNC: 8.3 G/DL (ref 6.4–8.2)
SODIUM SERPL-SCNC: 139 MMOL/L (ref 136–145)
TRIGL SERPL-MCNC: 102 MG/DL (ref 0–150)
VLDLC SERPL CALC-MCNC: 20 MG/DL

## 2024-07-16 ASSESSMENT — ENCOUNTER SYMPTOMS
NAUSEA: 0
COUGH: 0
TROUBLE SWALLOWING: 0
RHINORRHEA: 0
SHORTNESS OF BREATH: 0
ABDOMINAL PAIN: 0
SORE THROAT: 0
DIARRHEA: 0

## 2024-08-02 RX ORDER — ATENOLOL 50 MG/1
50 TABLET ORAL DAILY
Qty: 90 TABLET | Refills: 1 | Status: SHIPPED | OUTPATIENT
Start: 2024-08-02

## 2024-08-02 NOTE — TELEPHONE ENCOUNTER
Last appointment: 7/3/2024  Return in about 6 months (around 1/3/2025).   Next appointment:   I sent [employer] message for patient to schedule.   Last refill: 06/03/2024

## 2024-08-17 DIAGNOSIS — I10 ESSENTIAL HYPERTENSION: ICD-10-CM

## 2024-08-19 RX ORDER — NIFEDIPINE 90 MG/1
TABLET, EXTENDED RELEASE ORAL
Qty: 90 TABLET | Refills: 1 | Status: SHIPPED | OUTPATIENT
Start: 2024-08-19

## 2024-08-19 NOTE — TELEPHONE ENCOUNTER
Last appointment: 7/3/2024  Next appointment: Visit date not found  Last refill: 2/9/24  Due to f/u in Jan 25  Requested Prescriptions     Pending Prescriptions Disp Refills    NIFEdipine (PROCARDIA XL) 90 MG extended release tablet [Pharmacy Med Name: NIFEdipine ER 90 MG TAB, 24 HR] 90 tablet 1     Sig: TAKE 1 TABLET BY MOUTH DAILY

## 2024-09-01 DIAGNOSIS — R13.10 DYSPHAGIA, UNSPECIFIED TYPE: ICD-10-CM

## 2024-09-01 DIAGNOSIS — E78.5 HYPERLIPIDEMIA, UNSPECIFIED HYPERLIPIDEMIA TYPE: ICD-10-CM

## 2024-09-03 RX ORDER — FENOFIBRATE 145 MG/1
145 TABLET, COATED ORAL DAILY
Qty: 90 TABLET | Refills: 1 | Status: SHIPPED | OUTPATIENT
Start: 2024-09-03

## 2024-09-03 NOTE — TELEPHONE ENCOUNTER
Last appointment: 7/3/2024  Return in about 6 months (around 1/3/2025).  Next appointment: Visit date not found  Last refill:   Tricor: 02/27/2024  Prilosec: 06/03/2024  Sent patient a [employer] message for them to schedule an appointment.

## 2024-10-21 RX ORDER — TAMSULOSIN HYDROCHLORIDE 0.4 MG/1
0.4 CAPSULE ORAL EVERY MORNING
Qty: 90 CAPSULE | Refills: 1 | Status: SHIPPED | OUTPATIENT
Start: 2024-10-21

## 2024-10-21 NOTE — TELEPHONE ENCOUNTER
Last appointment: 7/3/2024  Next appointment: Visit date not found    Return in about 6 months (around 1/3/2025).  Last refill: 4/22/24  Requested Prescriptions     Pending Prescriptions Disp Refills    tamsulosin (FLOMAX) 0.4 MG capsule [Pharmacy Med Name: TAMSULOSIN HCL 0.4 MG CAPSULE] 90 capsule 1     Sig: TAKE 1 CAPSULE BY MOUTH EVERY MORNING     Sent [employer] message to schedule next appointment due in January.

## 2024-11-06 ENCOUNTER — OFFICE VISIT (OUTPATIENT)
Age: 59
End: 2024-11-06

## 2024-11-06 VITALS
TEMPERATURE: 98.4 F | OXYGEN SATURATION: 98 % | BODY MASS INDEX: 27.08 KG/M2 | SYSTOLIC BLOOD PRESSURE: 129 MMHG | HEIGHT: 74 IN | RESPIRATION RATE: 18 BRPM | DIASTOLIC BLOOD PRESSURE: 78 MMHG | HEART RATE: 76 BPM | WEIGHT: 211 LBS

## 2024-11-06 DIAGNOSIS — M25.562 ACUTE PAIN OF LEFT KNEE: Primary | ICD-10-CM

## 2024-11-06 RX ORDER — PREDNISONE 20 MG/1
40 TABLET ORAL DAILY
Qty: 10 TABLET | Refills: 0 | Status: SHIPPED | OUTPATIENT
Start: 2024-11-06 | End: 2024-11-11

## 2024-11-06 ASSESSMENT — ENCOUNTER SYMPTOMS
RHINORRHEA: 0
NAUSEA: 0
VOMITING: 0
COUGH: 0

## 2024-11-06 NOTE — PROGRESS NOTES
HENT:      Head: Normocephalic and atraumatic.   Pulmonary:      Effort: Pulmonary effort is normal. No respiratory distress.   Musculoskeletal:         General: Normal range of motion.      Cervical back: Normal range of motion and neck supple.      Comments: Mild TTP of the lateral aspect of the left knee with no erythema, edema or ecchymosis.  Has minimally decreased ROM due to pain.  Tib fib and ankle nontender.  PT pulse 2+.  No calf tenderness, erythema or edema   Skin:     General: Skin is warm.   Neurological:      Mental Status: He is alert.   Psychiatric:         Mood and Affect: Mood normal.         Behavior: Behavior normal.         Thought Content: Thought content normal.         Judgment: Judgment normal.         Chart reviewed - reviewed PCP note from annual exam visit on 7/3/24 - hx T2DM, vitamin D deficient, HLD, HTN, dysphagia, BPH and had right calf cellulitis.  Creatinine 1.2 same day      An electronic signature was used to authenticate this note.    --Christy Jacobson PA-C

## 2024-12-02 DIAGNOSIS — R13.10 DYSPHAGIA, UNSPECIFIED TYPE: ICD-10-CM

## 2024-12-02 NOTE — TELEPHONE ENCOUNTER
Last appointment: 7/3/2024  Next appointment: 1/16/2025  Last refill: 9/3/24  Requested Prescriptions     Pending Prescriptions Disp Refills    omeprazole (PRILOSEC) 20 MG delayed release capsule [Pharmacy Med Name: OMEPRAZOLE DR 20 MG CAPSULE] 90 capsule 0     Sig: TAKE 1 CAPSULE BY MOUTH EVERY MORNING BEFORE BREAKFAST FOR REFLUX

## 2025-01-13 SDOH — ECONOMIC STABILITY: INCOME INSECURITY: IN THE LAST 12 MONTHS, WAS THERE A TIME WHEN YOU WERE NOT ABLE TO PAY THE MORTGAGE OR RENT ON TIME?: NO

## 2025-01-13 SDOH — ECONOMIC STABILITY: FOOD INSECURITY: WITHIN THE PAST 12 MONTHS, THE FOOD YOU BOUGHT JUST DIDN'T LAST AND YOU DIDN'T HAVE MONEY TO GET MORE.: NEVER TRUE

## 2025-01-13 SDOH — ECONOMIC STABILITY: FOOD INSECURITY: WITHIN THE PAST 12 MONTHS, YOU WORRIED THAT YOUR FOOD WOULD RUN OUT BEFORE YOU GOT MONEY TO BUY MORE.: NEVER TRUE

## 2025-01-13 SDOH — ECONOMIC STABILITY: TRANSPORTATION INSECURITY
IN THE PAST 12 MONTHS, HAS THE LACK OF TRANSPORTATION KEPT YOU FROM MEDICAL APPOINTMENTS OR FROM GETTING MEDICATIONS?: NO

## 2025-01-13 ASSESSMENT — PATIENT HEALTH QUESTIONNAIRE - PHQ9
SUM OF ALL RESPONSES TO PHQ QUESTIONS 1-9: 2
SUM OF ALL RESPONSES TO PHQ QUESTIONS 1-9: 2
SUM OF ALL RESPONSES TO PHQ9 QUESTIONS 1 & 2: 2
SUM OF ALL RESPONSES TO PHQ QUESTIONS 1-9: 2
1. LITTLE INTEREST OR PLEASURE IN DOING THINGS: MORE THAN HALF THE DAYS
SUM OF ALL RESPONSES TO PHQ QUESTIONS 1-9: 2
2. FEELING DOWN, DEPRESSED OR HOPELESS: NOT AT ALL
SUM OF ALL RESPONSES TO PHQ9 QUESTIONS 1 & 2: 2
2. FEELING DOWN, DEPRESSED OR HOPELESS: NOT AT ALL
1. LITTLE INTEREST OR PLEASURE IN DOING THINGS: MORE THAN HALF THE DAYS

## 2025-01-15 NOTE — PROGRESS NOTES
Margarito Adams (:  1965) is a 59 y.o. male, here for evaluation of the following chief complaint(s):    6 Month Follow-Up      ASSESSMENT/PLAN:  1. Type 2 diabetes mellitus without complication, without long-term current use of insulin (HCC)  Patient is generally diet controlled  -     Albumin/Creatinine Ratio, Urine  -     Hemoglobin A1C; Future  2. Hyperlipidemia, unspecified hyperlipidemia type  Stable on Tricor  -     Lipid Panel; Future  -     Comprehensive Metabolic Panel; Future  3. Essential hypertension  Controlled on Tenormin and Procardia which we will continue  4. Screening PSA (prostate specific antigen)  -     PSA Screening; Future  5. Dysphagia, unspecified type  Symptoms are overall well-controlled and patient does not take Prilosec daily.   -     Magnesium; Future  BPH   Controlled on Flomax which we will continue  Return in about 6 months (around 2025).    SUBJECTIVE/OBJECTIVE:  HPI  Patient is here for routine visit.  He feels in his usual state of health and has no new complaints.    Review of Systems    Past Medical History:   Diagnosis Date    Acute renal failure (ARF) ([facility]) 2021    hematuria, sepsis, hosp stay    Arthritis     right knee    Back problem     Benign prostatic hyperplasia with urinary obstruction     urology group-[facility]    Bronchitis     Diabetes mellitus type 2, controlled ([facility])     pos microalbumin    Dysphagia     better after  egd    Fatty liver     Hypertension        Current Outpatient Medications   Medication Sig Dispense Refill    vitamin D (CHOLECALCIFEROL) 25 MCG (1000 UT) TABS tablet Take 1 tablet by mouth daily      omeprazole (PRILOSEC) 20 MG delayed release capsule TAKE 1 CAPSULE BY MOUTH EVERY MORNING BEFORE BREAKFAST FOR REFLUX 90 capsule 0    tamsulosin (FLOMAX) 0.4 MG capsule TAKE 1 CAPSULE BY MOUTH EVERY MORNING 90 capsule 1    fenofibrate (TRICOR) 145 MG tablet TAKE 1 TABLET BY MOUTH DAILY 90 tablet 1    NIFEdipine (PROCARDIA

## 2025-01-16 ENCOUNTER — OFFICE VISIT (OUTPATIENT)
Dept: INTERNAL MEDICINE CLINIC | Age: 60
End: 2025-01-16

## 2025-01-16 VITALS
SYSTOLIC BLOOD PRESSURE: 125 MMHG | DIASTOLIC BLOOD PRESSURE: 80 MMHG | WEIGHT: 216.4 LBS | HEART RATE: 54 BPM | BODY MASS INDEX: 27.78 KG/M2 | OXYGEN SATURATION: 98 %

## 2025-01-16 DIAGNOSIS — I10 ESSENTIAL HYPERTENSION: ICD-10-CM

## 2025-01-16 DIAGNOSIS — E78.5 HYPERLIPIDEMIA, UNSPECIFIED HYPERLIPIDEMIA TYPE: ICD-10-CM

## 2025-01-16 DIAGNOSIS — Z12.5 SCREENING PSA (PROSTATE SPECIFIC ANTIGEN): ICD-10-CM

## 2025-01-16 DIAGNOSIS — E11.9 TYPE 2 DIABETES MELLITUS WITHOUT COMPLICATION, WITHOUT LONG-TERM CURRENT USE OF INSULIN (HCC): Primary | ICD-10-CM

## 2025-01-16 DIAGNOSIS — R13.10 DYSPHAGIA, UNSPECIFIED TYPE: ICD-10-CM

## 2025-01-17 LAB
CREAT UR-MCNC: 218 MG/DL (ref 39–259)
MICROALBUMIN UR DL<=1MG/L-MCNC: 3.33 MG/DL
MICROALBUMIN/CREAT UR: 15.3 MG/G (ref 0–30)

## 2025-02-24 DIAGNOSIS — I10 ESSENTIAL HYPERTENSION: ICD-10-CM

## 2025-02-24 RX ORDER — NIFEDIPINE 90 MG/1
TABLET, EXTENDED RELEASE ORAL
Qty: 90 TABLET | Refills: 1 | Status: SHIPPED | OUTPATIENT
Start: 2025-02-24

## 2025-02-24 NOTE — TELEPHONE ENCOUNTER
Last appointment: 1/16/2025  NReturn in about 6 months (around 7/16/2025).    ext appointment: Visit date not found        Last refill: 8/19/24

## 2025-03-01 DIAGNOSIS — R13.10 DYSPHAGIA, UNSPECIFIED TYPE: ICD-10-CM

## 2025-03-01 DIAGNOSIS — E78.5 HYPERLIPIDEMIA, UNSPECIFIED HYPERLIPIDEMIA TYPE: ICD-10-CM

## 2025-03-03 RX ORDER — FENOFIBRATE 145 MG/1
145 TABLET, COATED ORAL DAILY
Qty: 90 TABLET | Refills: 0 | Status: SHIPPED | OUTPATIENT
Start: 2025-03-03

## 2025-03-03 RX ORDER — OMEPRAZOLE 20 MG/1
CAPSULE, DELAYED RELEASE ORAL
Qty: 90 CAPSULE | Refills: 0 | Status: SHIPPED | OUTPATIENT
Start: 2025-03-03

## 2025-03-03 NOTE — TELEPHONE ENCOUNTER
Last appointment: 1/16/2025  Next appointment: Visit date not found  Return in about 6 months (around 7/16/2025).  Last refill:   Fenofibrate: 09/03/2024  Prilosec: 12/02/2024      Sent [employer] message to schedule next appointment due in July.

## 2025-04-19 RX ORDER — TAMSULOSIN HYDROCHLORIDE 0.4 MG/1
0.4 CAPSULE ORAL EVERY MORNING
Qty: 90 CAPSULE | Refills: 0 | Status: SHIPPED | OUTPATIENT
Start: 2025-04-19

## 2025-04-19 NOTE — TELEPHONE ENCOUNTER
Last appointment: 1/16/2025  Return in about 6 months (around 7/16/2025).  Next appointment: Visit date not found  Last refill: 10/21/2024  Sent [employer] message to schedule next appointment due in July.

## 2025-06-04 RX ORDER — ATENOLOL 50 MG/1
50 TABLET ORAL DAILY
Qty: 90 TABLET | Refills: 1 | Status: SHIPPED | OUTPATIENT
Start: 2025-06-04

## 2025-06-19 DIAGNOSIS — R13.10 DYSPHAGIA, UNSPECIFIED TYPE: ICD-10-CM

## 2025-06-19 NOTE — TELEPHONE ENCOUNTER
Last appointment: Visit date not found  Next appointment: 7/28/2025  Last refill: 03/03/2025      omeprazole (PRILOSEC) 20 MG delayed release capsule   TAKE 1 CAPSULE BY MOUTH EVERY MORNING BEFORE BREAKFAST FOR REFLUX, Disp-90 capsule, R-0 Normal     [facility] [id number] [facility] [address] - P [phone] - F [phone]

## 2025-06-20 RX ORDER — OMEPRAZOLE 20 MG/1
CAPSULE, DELAYED RELEASE ORAL
Qty: 90 CAPSULE | Refills: 0 | Status: SHIPPED | OUTPATIENT
Start: 2025-06-20

## 2025-07-21 RX ORDER — TAMSULOSIN HYDROCHLORIDE 0.4 MG/1
0.4 CAPSULE ORAL EVERY MORNING
Qty: 90 CAPSULE | Refills: 0 | Status: SHIPPED | OUTPATIENT
Start: 2025-07-21

## 2025-07-25 SDOH — HEALTH STABILITY: PHYSICAL HEALTH: ON AVERAGE, HOW MANY DAYS PER WEEK DO YOU ENGAGE IN MODERATE TO STRENUOUS EXERCISE (LIKE A BRISK WALK)?: 2 DAYS

## 2025-07-25 SDOH — HEALTH STABILITY: PHYSICAL HEALTH: ON AVERAGE, HOW MANY MINUTES DO YOU ENGAGE IN EXERCISE AT THIS LEVEL?: 120 MIN

## 2025-07-28 ENCOUNTER — OFFICE VISIT (OUTPATIENT)
Dept: INTERNAL MEDICINE CLINIC | Age: 60
End: 2025-07-28
Payer: COMMERCIAL

## 2025-07-28 VITALS
SYSTOLIC BLOOD PRESSURE: 135 MMHG | WEIGHT: 209.4 LBS | DIASTOLIC BLOOD PRESSURE: 76 MMHG | OXYGEN SATURATION: 98 % | HEART RATE: 75 BPM | BODY MASS INDEX: 26.89 KG/M2 | TEMPERATURE: 97.9 F

## 2025-07-28 DIAGNOSIS — E55.9 VITAMIN D DEFICIENCY: ICD-10-CM

## 2025-07-28 DIAGNOSIS — E78.5 HYPERLIPIDEMIA, UNSPECIFIED HYPERLIPIDEMIA TYPE: ICD-10-CM

## 2025-07-28 DIAGNOSIS — E11.9 TYPE 2 DIABETES MELLITUS WITHOUT COMPLICATION, WITHOUT LONG-TERM CURRENT USE OF INSULIN (HCC): ICD-10-CM

## 2025-07-28 DIAGNOSIS — I10 ESSENTIAL HYPERTENSION: Primary | ICD-10-CM

## 2025-07-28 DIAGNOSIS — R13.10 DYSPHAGIA, UNSPECIFIED TYPE: ICD-10-CM

## 2025-07-28 DIAGNOSIS — I10 ESSENTIAL HYPERTENSION: ICD-10-CM

## 2025-07-28 DIAGNOSIS — Z12.5 SCREENING PSA (PROSTATE SPECIFIC ANTIGEN): ICD-10-CM

## 2025-07-28 LAB
25(OH)D3 SERPL-MCNC: 29.6 NG/ML
ALBUMIN SERPL-MCNC: 4.7 G/DL (ref 3.4–5)
ALBUMIN/GLOB SERPL: 1.4 {RATIO} (ref 1.1–2.2)
ALP SERPL-CCNC: 32 U/L (ref 40–129)
ALT SERPL-CCNC: 33 U/L (ref 10–40)
ANION GAP SERPL CALCULATED.3IONS-SCNC: 15 MMOL/L (ref 3–16)
AST SERPL-CCNC: 40 U/L (ref 15–37)
BILIRUB SERPL-MCNC: 0.6 MG/DL (ref 0–1)
BUN SERPL-MCNC: 20 MG/DL (ref 7–20)
CALCIUM SERPL-MCNC: 9.8 MG/DL (ref 8.3–10.6)
CHLORIDE SERPL-SCNC: 98 MMOL/L (ref 99–110)
CHOLEST SERPL-MCNC: 201 MG/DL (ref 0–199)
CO2 SERPL-SCNC: 22 MMOL/L (ref 21–32)
CREAT SERPL-MCNC: 1.3 MG/DL (ref 0.9–1.3)
EST. AVERAGE GLUCOSE BLD GHB EST-MCNC: 134.1 MG/DL
GFR SERPLBLD CREATININE-BSD FMLA CKD-EPI: 64 ML/MIN/{1.73_M2}
GLUCOSE SERPL-MCNC: 116 MG/DL (ref 70–99)
HBA1C MFR BLD: 6.3 %
HDLC SERPL-MCNC: 65 MG/DL (ref 40–60)
LDLC SERPL CALC-MCNC: ABNORMAL MG/DL
LDLC SERPL-MCNC: 88 MG/DL
POTASSIUM SERPL-SCNC: 3.8 MMOL/L (ref 3.5–5.1)
PROT SERPL-MCNC: 8 G/DL (ref 6.4–8.2)
PSA SERPL DL<=0.01 NG/ML-MCNC: 0.62 NG/ML (ref 0–4)
SODIUM SERPL-SCNC: 135 MMOL/L (ref 136–145)
TRIGL SERPL-MCNC: 434 MG/DL (ref 0–150)
TSH SERPL DL<=0.005 MIU/L-ACNC: 1.11 UIU/ML (ref 0.27–4.2)
VLDLC SERPL CALC-MCNC: ABNORMAL MG/DL

## 2025-07-28 PROCEDURE — 3075F SYST BP GE 130 - 139MM HG: CPT | Performed by: INTERNAL MEDICINE

## 2025-07-28 PROCEDURE — 99215 OFFICE O/P EST HI 40 MIN: CPT | Performed by: INTERNAL MEDICINE

## 2025-07-28 PROCEDURE — 3078F DIAST BP <80 MM HG: CPT | Performed by: INTERNAL MEDICINE

## 2025-07-28 RX ORDER — OMEPRAZOLE 20 MG/1
CAPSULE, DELAYED RELEASE ORAL
Qty: 90 CAPSULE | Refills: 1 | Status: SHIPPED | OUTPATIENT
Start: 2025-07-28

## 2025-07-28 RX ORDER — TAMSULOSIN HYDROCHLORIDE 0.4 MG/1
0.4 CAPSULE ORAL EVERY MORNING
Qty: 90 CAPSULE | Refills: 1 | Status: SHIPPED | OUTPATIENT
Start: 2025-07-28

## 2025-07-28 RX ORDER — FENOFIBRATE 145 MG/1
145 TABLET, FILM COATED ORAL DAILY
Qty: 90 TABLET | Refills: 1 | Status: SHIPPED | OUTPATIENT
Start: 2025-07-28

## 2025-07-28 RX ORDER — ATENOLOL 50 MG/1
50 TABLET ORAL DAILY
Qty: 90 TABLET | Refills: 1 | Status: SHIPPED | OUTPATIENT
Start: 2025-07-28

## 2025-07-28 RX ORDER — NIFEDIPINE 90 MG/1
TABLET, EXTENDED RELEASE ORAL
Qty: 90 TABLET | Refills: 1 | Status: SHIPPED | OUTPATIENT
Start: 2025-07-28

## 2025-07-28 ASSESSMENT — ENCOUNTER SYMPTOMS
COUGH: 0
SHORTNESS OF BREATH: 0
SORE THROAT: 0
ABDOMINAL PAIN: 0
NAUSEA: 0
VOMITING: 0
BLOOD IN STOOL: 0

## 2025-07-28 NOTE — PROGRESS NOTES
complete documentation in electronic health records.      An electronic signature was used to authenticate this note.    --Raquel Hernandez MD

## 2025-07-31 ENCOUNTER — RESULTS FOLLOW-UP (OUTPATIENT)
Dept: INTERNAL MEDICINE CLINIC | Age: 60
End: 2025-07-31

## 2025-07-31 DIAGNOSIS — E55.9 VITAMIN D INSUFFICIENCY: Primary | ICD-10-CM
